# Patient Record
Sex: MALE | Race: BLACK OR AFRICAN AMERICAN | NOT HISPANIC OR LATINO
[De-identification: names, ages, dates, MRNs, and addresses within clinical notes are randomized per-mention and may not be internally consistent; named-entity substitution may affect disease eponyms.]

---

## 2018-03-12 ENCOUNTER — APPOINTMENT (OUTPATIENT)
Dept: ULTRASOUND IMAGING | Facility: HOSPITAL | Age: 83
End: 2018-03-12

## 2018-03-12 ENCOUNTER — OUTPATIENT (OUTPATIENT)
Dept: OUTPATIENT SERVICES | Facility: HOSPITAL | Age: 83
LOS: 1 days | Discharge: HOME | End: 2018-03-12
Payer: MEDICARE

## 2018-03-12 DIAGNOSIS — I82.403 ACUTE EMBOLISM AND THROMBOSIS OF UNSPECIFIED DEEP VEINS OF LOWER EXTREMITY, BILATERAL: ICD-10-CM

## 2018-03-12 PROCEDURE — 93925 LOWER EXTREMITY STUDY: CPT | Mod: 26

## 2019-08-22 ENCOUNTER — APPOINTMENT (OUTPATIENT)
Dept: PULMONOLOGY | Facility: CLINIC | Age: 84
End: 2019-08-22
Payer: MEDICARE

## 2019-08-22 VITALS
RESPIRATION RATE: 16 BRPM | SYSTOLIC BLOOD PRESSURE: 131 MMHG | WEIGHT: 173 LBS | HEART RATE: 68 BPM | BODY MASS INDEX: 30.65 KG/M2 | DIASTOLIC BLOOD PRESSURE: 68 MMHG | TEMPERATURE: 97.8 F | HEIGHT: 63 IN

## 2019-08-22 DIAGNOSIS — I25.2 OLD MYOCARDIAL INFARCTION: ICD-10-CM

## 2019-08-22 DIAGNOSIS — I25.10 ATHEROSCLEROTIC HEART DISEASE OF NATIVE CORONARY ARTERY W/OUT ANGINA PECTORIS: ICD-10-CM

## 2019-08-22 DIAGNOSIS — F17.200 NICOTINE DEPENDENCE, UNSPECIFIED, UNCOMPLICATED: ICD-10-CM

## 2019-08-22 DIAGNOSIS — Z72.0 TOBACCO USE: ICD-10-CM

## 2019-08-22 DIAGNOSIS — R06.83 SNORING: ICD-10-CM

## 2019-08-22 DIAGNOSIS — I73.9 PERIPHERAL VASCULAR DISEASE, UNSPECIFIED: ICD-10-CM

## 2019-08-22 PROCEDURE — 99204 OFFICE O/P NEW MOD 45 MIN: CPT

## 2019-08-22 RX ORDER — ASPIRIN 81 MG/1
81 TABLET ORAL
Refills: 0 | Status: ACTIVE | COMMUNITY
Start: 2019-08-22

## 2019-08-22 RX ORDER — METOPROLOL TARTRATE 100 MG/1
100 TABLET, FILM COATED ORAL
Refills: 0 | Status: ACTIVE | COMMUNITY
Start: 2019-08-22

## 2019-08-22 RX ORDER — CLOPIDOGREL BISULFATE 75 MG/1
75 TABLET, FILM COATED ORAL
Refills: 0 | Status: ACTIVE | COMMUNITY
Start: 2019-08-22

## 2019-08-22 NOTE — HISTORY OF PRESENT ILLNESS
[Snoring] : Snoring [FreeTextEntry1] : Mr. Loyd is an 83-year-old male with significant past medical history of coronary artery disease who comes in for evaluation of sleep disorder breathing. Per the patient he recently underwent stents in his lower extremities for peripheral vascular disease and was noted to have loud snoring and witnessed apnea. The patient indicates that he has excessive daytime sleepiness, nonrestorative sleep, and frequent nocturnal awakenings for which he attributes to nocturia. He states that in the last year he might have gotten up 2 or 3 times gasping for air in the middle of the night.\par \par After further discussion, the patient does admit to a lifelong smoking history. He began smoking in his teenage years, and is still smoking today. He used to smoke between 1-2 packs a day, but now smokes half a pack a day. He is tried to stop smoking but has been unsuccessful. He is interested in quitting.\par \par He also states that he has dyspnea on exertion. He has noticed that he tends to lean forward when he tries to exhale. He can only walk one or 2 blocks without being short of breath. He admits however, this has gotten worse since his mild myocardial infarction.

## 2019-08-22 NOTE — REVIEW OF SYSTEMS
[Fatigue] : fatigue [EDS: ESS=____] : daytime somnolence: ESS=[unfilled] [Snoring] : snoring [Witnessed Apneas] : witnessed apnea [A.M. Dry Mouth] : a.m. dry mouth [Obesity] : obesity [A.M. Headache] : headache present upon awakening [Negative] : Psychiatric [Leg Dysesthesias] : no leg dysesthesias [Difficulty Initiating Sleep] : no difficulty falling asleep [Difficulty Maintaining Sleep] : no difficulty maintaining sleep [Lower Extremity Discomfort] : no lower extremity discomfort [Irresistible urge to move legs] : no irresistible urge to move legs because of lower extremity discomfort [Late day/ Evening symptoms] : no late day/evening symptoms [Sleep Disturbances due to LE symptoms] : ~T no sleep disturbances due to lower extremity symptoms [Unusual Sleep Behavior] : no unusual sleep behavior [Hypersomnolence] : not sleeping much more than usual [Cataplexy] :  no cataplexy [Hypnopompic Hallucinations] : no hypnopompic hallucinations [Hypnogogic Hallucinations] : no hypnogogic hallucinations [Sleep Paralysis] : no sleep paralysis

## 2019-08-22 NOTE — PHYSICAL EXAM
[General Appearance - Well Developed] : well developed [Normal Appearance] : normal appearance [General Appearance - Well Nourished] : well nourished [Normal Conjunctiva] : the conjunctiva exhibited no abnormalities [Enlarged Base of the Tongue] : enlargement of the base of the tongue [Low Lying Soft Palate] : low lying soft palate [IV] : IV [Neck Appearance] : the appearance of the neck was normal [Neck Circumference: ___] : neck circumference is [unfilled] [Neck Cervical Mass (___cm)] : no neck mass was observed [Apical Impulse] : the apical impulse was normal [Heart Rate And Rhythm] : heart rate was normal and rhythm regular [Heart Sounds] : normal S1 and S2 [] : no respiratory distress [Auscultation Breath Sounds / Voice Sounds] : lungs were clear to auscultation bilaterally [Respiration, Rhythm And Depth] : normal respiratory rhythm and effort [Musculoskeletal - Swelling] : no joint swelling seen [Cyanosis, Localized] : no localized cyanosis [Nail Clubbing] : no clubbing of the fingernails [Petechial Hemorrhages (___cm)] : no petechial hemorrhages [Skin Color & Pigmentation] : normal skin color and pigmentation [Skin Turgor] : normal skin turgor [No Focal Deficits] : no focal deficits [Oriented To Time, Place, And Person] : oriented to person, place, and time [Impaired Insight] : insight and judgment were intact [Affect] : the affect was normal [FreeTextEntry2] : No edema

## 2019-08-27 ENCOUNTER — EMERGENCY (EMERGENCY)
Facility: HOSPITAL | Age: 84
LOS: 0 days | Discharge: HOME | End: 2019-08-27
Payer: MEDICARE

## 2019-08-27 VITALS
HEART RATE: 74 BPM | OXYGEN SATURATION: 97 % | WEIGHT: 173.06 LBS | DIASTOLIC BLOOD PRESSURE: 72 MMHG | RESPIRATION RATE: 19 BRPM | TEMPERATURE: 98 F | SYSTOLIC BLOOD PRESSURE: 147 MMHG | HEIGHT: 65 IN

## 2019-08-27 VITALS
HEART RATE: 57 BPM | OXYGEN SATURATION: 97 % | SYSTOLIC BLOOD PRESSURE: 122 MMHG | DIASTOLIC BLOOD PRESSURE: 57 MMHG | TEMPERATURE: 98 F | RESPIRATION RATE: 15 BRPM
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
## 2019-08-27 DIAGNOSIS — I10 ESSENTIAL (PRIMARY) HYPERTENSION: ICD-10-CM

## 2019-08-27 DIAGNOSIS — M25.552 PAIN IN LEFT HIP: ICD-10-CM

## 2019-08-27 DIAGNOSIS — M25.512 PAIN IN LEFT SHOULDER: ICD-10-CM

## 2019-08-27 DIAGNOSIS — I25.10 ATHEROSCLEROTIC HEART DISEASE OF NATIVE CORONARY ARTERY WITHOUT ANGINA PECTORIS: ICD-10-CM
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
## 2019-08-27 PROCEDURE — 72192 CT PELVIS W/O DYE: CPT | Mod: 26

## 2019-08-27 PROCEDURE — 73030 X-RAY EXAM OF SHOULDER: CPT | Mod: 26,LT

## 2019-08-27 PROCEDURE — 99284 EMERGENCY DEPT VISIT MOD MDM: CPT

## 2019-08-27 PROCEDURE — 76377 3D RENDER W/INTRP POSTPROCES: CPT | Mod: 26

## 2019-08-27 PROCEDURE — 71046 X-RAY EXAM CHEST 2 VIEWS: CPT | Mod: 26

## 2019-08-27 RX ORDER — CYCLOBENZAPRINE HYDROCHLORIDE 10 MG/1
5 TABLET, FILM COATED ORAL ONCE
Refills: 0 | Status: COMPLETED | OUTPATIENT
Start: 2019-08-27 | End: 2019-08-27

## 2019-08-27 RX ORDER — CYCLOBENZAPRINE HYDROCHLORIDE 10 MG/1
1 TABLET, FILM COATED ORAL
Qty: 15 | Refills: 0
Start: 2019-08-27 | End: 2019-08-31

## 2019-08-27 RX ORDER — ACETAMINOPHEN 500 MG
650 TABLET ORAL ONCE
Refills: 0 | Status: COMPLETED | OUTPATIENT
Start: 2019-08-27 | End: 2019-08-27

## 2019-08-27 RX ADMIN — Medication 650 MILLIGRAM(S): at 18:20

## 2019-08-27 RX ADMIN — CYCLOBENZAPRINE HYDROCHLORIDE 5 MILLIGRAM(S): 10 TABLET, FILM COATED ORAL at 17:49

## 2019-08-27 RX ADMIN — Medication 650 MILLIGRAM(S): at 17:51

## 2019-08-27 NOTE — ED ADULT TRIAGE NOTE - CHIEF COMPLAINT QUOTE
Pt walked in c/o pain to the left shoulder and left leg s/p tripped and fell on the street 4 days ago denies any LOC hx cad of with stents

## 2019-08-27 NOTE — ED PROVIDER NOTE - CLINICAL SUMMARY MEDICAL DECISION MAKING FREE TEXT BOX
84 yo M s/p fall 4 days ago, with L shoulder and L hip pain on certain movements, benign exam, unable to reproduce pain, given age will give tylenol, lose dose muscle relaxer, obtain XRs and CT pelvis and reassess  XRs and CT negative, pt feeling much better after medications, plan to d/c home with outpt ortho f/u

## 2019-08-27 NOTE — ED PROVIDER NOTE - MUSCULOSKELETAL, MLM
L shoulder NTTP, FROM intact, L lateral ribs NTTP, L hip NTTP, FROM intact.  Spine appears normal, range of motion is not limited, no muscle or joint tenderness

## 2019-08-27 NOTE — ED PROVIDER NOTE - OBJECTIVE STATEMENT
82 yo M with PMHx HTN, CAD s/p stents many years ago (not on asa, plavix or other blood thinners), presents to ED c/o L shoulder pain s/p fall 4 days ago.  Pt states he was walking when he fell on uneven sidewalk, did not lose consciousness or hit his head, pt states he fell onto his L side, now c/o L shoulder pain with certain movements, and mild L hip pain.  Pt denies and CP, SOB, dizziness, numbness, tingling, HA, visual changes, or other acute complaints.  Pt is able to ambulate. Took ibuprofen with relief     ROS: No fever/chills. No eye pain/changes in vision, No ear pain/sore throat/dysphagia, No chest pain/palpitations. No SOB/cough/. No abdominal pain, N/V/D, no black/bloody bm. No dysuria/frequency/discharge, No headache. No Dizziness.    No rashes or breaks in skin. No numbness/tingling/weakness.

## 2019-08-27 NOTE — ED PROVIDER NOTE - PATIENT PORTAL LINK FT
You can access the FollowMyHealth Patient Portal offered by [facility] by registering at the following website: [email or web address]. By joining [employer]’s FollowMyHealth portal, you will also be able to view your health information using other applications (apps) compatible with our system.

## 2019-08-27 NOTE — ED PROVIDER NOTE - PROGRESS NOTE DETAILS
XRs negative for fx CT pelvis negative, pt feeling much better after medictaions plan to d/c home with outpt f/u with ortho, strict return precautions given including worsening pain, CP, SOB, pt and family at bedside verbalized understanding, tylenol prn  Kristi Cho PA-C

## 2019-08-27 NOTE — ED PROVIDER NOTE - CARE PLAN
Principal Discharge DX:	Fall, initial encounter  Secondary Diagnosis:	Acute pain of left shoulder  Secondary Diagnosis:	Pain of left hip joint

## 2019-08-27 NOTE — ED ADULT NURSE NOTE - OBJECTIVE STATEMENT
83 year old male presents with left shoulder and arm Pt with pain to fall on Friday he fell in the street . He fell onto his left  side denies hitting his head.   Dragging right foot since the fall. He denies:  chest pains, SOB numbness and tingling

## 2019-09-05 ENCOUNTER — APPOINTMENT (OUTPATIENT)
Dept: PULMONOLOGY | Facility: CLINIC | Age: 84
End: 2019-09-05
Payer: MEDICARE

## 2019-09-05 PROCEDURE — 94726 PLETHYSMOGRAPHY LUNG VOLUMES: CPT

## 2019-09-05 PROCEDURE — 94060 EVALUATION OF WHEEZING: CPT

## 2019-09-05 PROCEDURE — 94729 DIFFUSING CAPACITY: CPT

## 2019-09-09 ENCOUNTER — APPOINTMENT (OUTPATIENT)
Dept: PULMONOLOGY | Facility: CLINIC | Age: 84
End: 2019-09-09

## 2019-10-25 ENCOUNTER — CLINICAL ADVICE (OUTPATIENT)
Age: 84
End: 2019-10-25

## 2019-10-31 ENCOUNTER — APPOINTMENT (OUTPATIENT)
Dept: SLEEP CENTER | Facility: CLINIC | Age: 84
End: 2019-10-31

## 2019-11-20 NOTE — ASSESSMENT
[FreeTextEntry1] : This is a 83 year old male here for evaluation of possible sleep apnea. The patient has multiple signs and symptoms of sleep-disordered breathing include loud snoring, witnessed apnea,  excessive daytime sleepiness, nonrestorative sleep, and frequent nocturnal awakenings  . He was referred to the [facility] for a diagnostic PSG. The ramifications of RAD and its potential therapeutic modalities were discussed with the patient. The patient was cautioned on the importance of avoiding drowsy driving. He will follow up with us after the PSG.\par \par Given his smoking history, I have also recommended to perform PFTs to evaluate if he has COPD. I spoke at length about how to quit smoking, and gave him a referral to the [facility] tobacco control center.
[phone]

## 2020-01-13 ENCOUNTER — APPOINTMENT (OUTPATIENT)
Dept: SLEEP CENTER | Facility: CLINIC | Age: 85
End: 2020-01-13
Payer: MEDICARE

## 2020-01-13 ENCOUNTER — OUTPATIENT (OUTPATIENT)
Dept: OUTPATIENT SERVICES | Facility: HOSPITAL | Age: 85
LOS: 1 days | End: 2020-01-13
Payer: MEDICARE

## 2020-01-13 PROCEDURE — 95806 SLEEP STUDY UNATT&RESP EFFT: CPT

## 2020-01-13 PROCEDURE — 95806 SLEEP STUDY UNATT&RESP EFFT: CPT | Mod: 26

## 2020-01-17 DIAGNOSIS — G47.33 OBSTRUCTIVE SLEEP APNEA (ADULT) (PEDIATRIC): ICD-10-CM

## 2020-01-21 DIAGNOSIS — G47.33 OBSTRUCTIVE SLEEP APNEA (ADULT) (PEDIATRIC): ICD-10-CM

## 2020-03-09 ENCOUNTER — FORM ENCOUNTER (OUTPATIENT)
Age: 85
End: 2020-03-09

## 2020-03-10 ENCOUNTER — APPOINTMENT (OUTPATIENT)
Dept: SLEEP CENTER | Facility: CLINIC | Age: 85
End: 2020-03-10
Payer: MEDICARE

## 2020-03-10 ENCOUNTER — OUTPATIENT (OUTPATIENT)
Dept: OUTPATIENT SERVICES | Facility: HOSPITAL | Age: 85
LOS: 1 days | End: 2020-03-10
Payer: MEDICARE

## 2020-03-10 PROCEDURE — G0400: CPT

## 2020-03-10 PROCEDURE — G0400: CPT | Mod: 26

## 2020-03-25 DIAGNOSIS — G47.33 OBSTRUCTIVE SLEEP APNEA (ADULT) (PEDIATRIC): ICD-10-CM

## 2021-06-10 ENCOUNTER — APPOINTMENT (OUTPATIENT)
Dept: VASCULAR SURGERY | Facility: CLINIC | Age: 86
End: 2021-06-10

## 2021-11-18 ENCOUNTER — APPOINTMENT (OUTPATIENT)
Dept: ORTHOPEDIC SURGERY | Facility: CLINIC | Age: 86
End: 2021-11-18
Payer: MEDICARE

## 2021-11-18 VITALS — WEIGHT: 167 LBS | HEIGHT: 62 IN | BODY MASS INDEX: 30.73 KG/M2

## 2021-11-18 DIAGNOSIS — M17.11 UNILATERAL PRIMARY OSTEOARTHRITIS, RIGHT KNEE: ICD-10-CM

## 2021-11-18 DIAGNOSIS — M54.50 LOW BACK PAIN, UNSPECIFIED: ICD-10-CM

## 2021-11-18 PROCEDURE — 72100 X-RAY EXAM L-S SPINE 2/3 VWS: CPT

## 2021-11-18 PROCEDURE — 73562 X-RAY EXAM OF KNEE 3: CPT | Mod: RT

## 2021-11-18 PROCEDURE — 99204 OFFICE O/P NEW MOD 45 MIN: CPT

## 2021-11-25 ENCOUNTER — INPATIENT (INPATIENT)
Facility: HOSPITAL | Age: 86
LOS: 18 days | Discharge: NURSING, REHAB OR LONG-TERM CARE | End: 2021-12-14
Attending: INTERNAL MEDICINE | Admitting: INTERNAL MEDICINE
Payer: MEDICARE

## 2021-11-25 VITALS
TEMPERATURE: 102 F | DIASTOLIC BLOOD PRESSURE: 74 MMHG | HEART RATE: 79 BPM | SYSTOLIC BLOOD PRESSURE: 117 MMHG | WEIGHT: 160.06 LBS | RESPIRATION RATE: 20 BRPM | OXYGEN SATURATION: 100 % | HEIGHT: 65 IN

## 2021-11-25 LAB
ALBUMIN SERPL ELPH-MCNC: 3.2 G/DL — LOW (ref 3.3–5)
ALP SERPL-CCNC: 112 U/L — SIGNIFICANT CHANGE UP (ref 40–120)
ALT FLD-CCNC: 30 U/L — SIGNIFICANT CHANGE UP (ref 12–78)
ANION GAP SERPL CALC-SCNC: 8 MMOL/L — SIGNIFICANT CHANGE UP (ref 5–17)
AST SERPL-CCNC: 63 U/L — HIGH (ref 15–37)
BILIRUB SERPL-MCNC: 0.3 MG/DL — SIGNIFICANT CHANGE UP (ref 0.2–1.2)
BUN SERPL-MCNC: 36 MG/DL — HIGH (ref 7–23)
CALCIUM SERPL-MCNC: 9.2 MG/DL — SIGNIFICANT CHANGE UP (ref 8.5–10.1)
CHLORIDE SERPL-SCNC: 106 MMOL/L — SIGNIFICANT CHANGE UP (ref 96–108)
CO2 SERPL-SCNC: 24 MMOL/L — SIGNIFICANT CHANGE UP (ref 22–31)
CREAT SERPL-MCNC: 1.79 MG/DL — HIGH (ref 0.5–1.3)
GLUCOSE SERPL-MCNC: 120 MG/DL — HIGH (ref 70–99)
LACTATE SERPL-SCNC: 1.7 MMOL/L — SIGNIFICANT CHANGE UP (ref 0.7–2)
MAGNESIUM SERPL-MCNC: 2.5 MG/DL — SIGNIFICANT CHANGE UP (ref 1.6–2.6)
PHOSPHATE SERPL-MCNC: 3.3 MG/DL — SIGNIFICANT CHANGE UP (ref 2.5–4.5)
POTASSIUM SERPL-MCNC: 4.4 MMOL/L — SIGNIFICANT CHANGE UP (ref 3.5–5.3)
POTASSIUM SERPL-SCNC: 4.4 MMOL/L — SIGNIFICANT CHANGE UP (ref 3.5–5.3)
PROT SERPL-MCNC: 7.3 GM/DL — SIGNIFICANT CHANGE UP (ref 6–8.3)
SODIUM SERPL-SCNC: 138 MMOL/L — SIGNIFICANT CHANGE UP (ref 135–145)
TROPONIN I, HIGH SENSITIVITY RESULT: 43.8 NG/L — SIGNIFICANT CHANGE UP

## 2021-11-25 PROCEDURE — 99291 CRITICAL CARE FIRST HOUR: CPT

## 2021-11-25 PROCEDURE — 70450 CT HEAD/BRAIN W/O DYE: CPT | Mod: 26,MA

## 2021-11-25 PROCEDURE — 71045 X-RAY EXAM CHEST 1 VIEW: CPT | Mod: 26

## 2021-11-25 PROCEDURE — 93010 ELECTROCARDIOGRAM REPORT: CPT

## 2021-11-25 RX ORDER — SODIUM CHLORIDE 9 MG/ML
2300 INJECTION, SOLUTION INTRAVENOUS ONCE
Refills: 0 | Status: COMPLETED | OUTPATIENT
Start: 2021-11-25 | End: 2021-11-25

## 2021-11-25 RX ORDER — SODIUM CHLORIDE 9 MG/ML
1000 INJECTION INTRAMUSCULAR; INTRAVENOUS; SUBCUTANEOUS ONCE
Refills: 0 | Status: DISCONTINUED | OUTPATIENT
Start: 2021-11-25 | End: 2021-11-25

## 2021-11-25 RX ORDER — VANCOMYCIN HCL 1 G
1000 VIAL (EA) INTRAVENOUS ONCE
Refills: 0 | Status: COMPLETED | OUTPATIENT
Start: 2021-11-25 | End: 2021-11-25

## 2021-11-25 RX ORDER — ACETAMINOPHEN 500 MG
650 TABLET ORAL ONCE
Refills: 0 | Status: COMPLETED | OUTPATIENT
Start: 2021-11-25 | End: 2021-11-25

## 2021-11-25 RX ORDER — CEFEPIME 1 G/1
1000 INJECTION, POWDER, FOR SOLUTION INTRAMUSCULAR; INTRAVENOUS ONCE
Refills: 0 | Status: COMPLETED | OUTPATIENT
Start: 2021-11-25 | End: 2021-11-25

## 2021-11-25 RX ADMIN — Medication 650 MILLIGRAM(S): at 22:56

## 2021-11-25 RX ADMIN — SODIUM CHLORIDE 2300 MILLILITER(S): 9 INJECTION, SOLUTION INTRAVENOUS at 22:55

## 2021-11-25 RX ADMIN — CEFEPIME 100 MILLIGRAM(S): 1 INJECTION, POWDER, FOR SOLUTION INTRAMUSCULAR; INTRAVENOUS at 22:55

## 2021-11-25 NOTE — ED PROVIDER NOTE - OBJECTIVE STATEMENT
85M PMHx of CAD w/ stenting, HTN presenting with weakness and fatigue today. Described as generalized weakness a/w myalgias and fever at home. Denies any chest pain, shortness of breath, falls, trauma, syncope, neck pain, neck stiffness, dysuria/hematuria, diarrhea, constipation, abdominal pain, coughs, sick contacts. No prior covid vaccination.

## 2021-11-25 NOTE — ED PROVIDER NOTE - CLINICAL SUMMARY MEDICAL DECISION MAKING FREE TEXT BOX
The patient meets SIRS criteria with (+)  Temp > 100.4F or < 96.8F,  HR > 90 BPM,    Suspected source of infection is: XXX. Patient is currently hemodynamically stable, normotensive, mentating well.   - Two large-bore IV's, 30ml/kg lactated ringers fluid resuscitation,   - CBC, CMP, PT/PTT/INR, Troponin, BNP, UA/UCx, 2 x BCx, ABG with lactate + COVID-19 rule out.   - CXR     - Unknown Source: Vancomycin 1g IVPB + Cefepime 1g IVPB    - Re-assessment for fluid responsiveness then admit

## 2021-11-25 NOTE — ED ADULT TRIAGE NOTE - CHIEF COMPLAINT QUOTE
As per EMS states pt stopped taking his medication to take holistic meds. States pt with weight loss, dehydration and fogginess.

## 2021-11-25 NOTE — ED PROVIDER NOTE - PROGRESS NOTE DETAILS
(+) COVID, (+) UTI, saturating 95% on 2L NC. otherwise hemodynamically stable, lactate 1.7, mild BERTHA 1.79, CTH negative. discussed with daughter, to be admitted.

## 2021-11-25 NOTE — ED PROVIDER NOTE - CARE PLAN
1 Principal Discharge DX:	Acute respiratory failure with hypoxia  Secondary Diagnosis:	Pneumonia due to COVID-19 virus  Secondary Diagnosis:	Acute UTI

## 2021-11-25 NOTE — ED ADULT NURSE NOTE - OBJECTIVE STATEMENT
84yo M hx Aox3, hx of prostate cancer, cardiac stent on blood thinners, nkda, presenst to ED As per EMS states pt stopped taking his medication to take holistic meds. States pt with weight loss, dehydration and fogginess.  As per daughter pt fell out of bed last sat, unsure if pt hit his head.  Then since sunday pt seemed "a bit far" and was not eating and had no appetite.   Pt denies any pain or discomfort right now.

## 2021-11-25 NOTE — ED ADULT NURSE NOTE - ED STAT RN HANDOFF DETAILS
Report given to receiving RN,pts history, current condition and reason for admission discussed, safety concerns addressed and reviewed, pt currently in stable condition, IV flushes for patency and site shows no signs or symptoms of infiltrate, dressing is clean dry and intact, pt is aware of plan of care. Pt education deemed successful at time of report after patient demonstrates successful teach back for proficiency.

## 2021-11-26 DIAGNOSIS — I25.10 ATHEROSCLEROTIC HEART DISEASE OF NATIVE CORONARY ARTERY WITHOUT ANGINA PECTORIS: ICD-10-CM

## 2021-11-26 DIAGNOSIS — U07.1 COVID-19: ICD-10-CM

## 2021-11-26 DIAGNOSIS — N17.9 ACUTE KIDNEY FAILURE, UNSPECIFIED: ICD-10-CM

## 2021-11-26 DIAGNOSIS — N30.00 ACUTE CYSTITIS WITHOUT HEMATURIA: ICD-10-CM

## 2021-11-26 DIAGNOSIS — E78.5 HYPERLIPIDEMIA, UNSPECIFIED: ICD-10-CM

## 2021-11-26 DIAGNOSIS — Z29.9 ENCOUNTER FOR PROPHYLACTIC MEASURES, UNSPECIFIED: ICD-10-CM

## 2021-11-26 LAB
ALBUMIN SERPL ELPH-MCNC: 3.1 G/DL — LOW (ref 3.3–5)
ALP SERPL-CCNC: 110 U/L — SIGNIFICANT CHANGE UP (ref 40–120)
ALT FLD-CCNC: 38 U/L — SIGNIFICANT CHANGE UP (ref 12–78)
APPEARANCE UR: ABNORMAL
APTT BLD: 36.6 SEC — HIGH (ref 27.5–35.5)
AST SERPL-CCNC: 92 U/L — HIGH (ref 15–37)
BACTERIA # UR AUTO: ABNORMAL
BASOPHILS # BLD AUTO: 0.02 K/UL — SIGNIFICANT CHANGE UP (ref 0–0.2)
BASOPHILS NFR BLD AUTO: 0.5 % — SIGNIFICANT CHANGE UP (ref 0–2)
BILIRUB DIRECT SERPL-MCNC: 0.1 MG/DL — SIGNIFICANT CHANGE UP (ref 0–0.3)
BILIRUB INDIRECT FLD-MCNC: 0.2 MG/DL — SIGNIFICANT CHANGE UP (ref 0.2–1)
BILIRUB SERPL-MCNC: 0.3 MG/DL — SIGNIFICANT CHANGE UP (ref 0.2–1.2)
BILIRUB UR-MCNC: NEGATIVE — SIGNIFICANT CHANGE UP
COLOR SPEC: YELLOW — SIGNIFICANT CHANGE UP
CREAT SERPL-MCNC: 1.35 MG/DL — HIGH (ref 0.5–1.3)
D DIMER BLD IA.RAPID-MCNC: 222 NG/ML DDU — SIGNIFICANT CHANGE UP
DIFF PNL FLD: ABNORMAL
EOSINOPHIL # BLD AUTO: 0.03 K/UL — SIGNIFICANT CHANGE UP (ref 0–0.5)
EOSINOPHIL NFR BLD AUTO: 0.7 % — SIGNIFICANT CHANGE UP (ref 0–6)
EPI CELLS # UR: SIGNIFICANT CHANGE UP
FLUAV AG NPH QL: SIGNIFICANT CHANGE UP
FLUBV AG NPH QL: SIGNIFICANT CHANGE UP
GLUCOSE BLDC GLUCOMTR-MCNC: 108 MG/DL — HIGH (ref 70–99)
GLUCOSE BLDC GLUCOMTR-MCNC: 132 MG/DL — HIGH (ref 70–99)
GLUCOSE BLDC GLUCOMTR-MCNC: 91 MG/DL — SIGNIFICANT CHANGE UP (ref 70–99)
GLUCOSE UR QL: NEGATIVE MG/DL — SIGNIFICANT CHANGE UP
GRAN CASTS # UR COMP ASSIST: ABNORMAL /LPF
HCT VFR BLD CALC: 46.4 % — SIGNIFICANT CHANGE UP (ref 39–50)
HGB BLD-MCNC: 15.1 G/DL — SIGNIFICANT CHANGE UP (ref 13–17)
HYALINE CASTS # UR AUTO: ABNORMAL /LPF
IMM GRANULOCYTES NFR BLD AUTO: 0.7 % — SIGNIFICANT CHANGE UP (ref 0–1.5)
INR BLD: 1.11 RATIO — SIGNIFICANT CHANGE UP (ref 0.88–1.16)
KETONES UR-MCNC: ABNORMAL
LEUKOCYTE ESTERASE UR-ACNC: ABNORMAL
LYMPHOCYTES # BLD AUTO: 0.78 K/UL — LOW (ref 1–3.3)
LYMPHOCYTES # BLD AUTO: 17.8 % — SIGNIFICANT CHANGE UP (ref 13–44)
MCHC RBC-ENTMCNC: 29.9 PG — SIGNIFICANT CHANGE UP (ref 27–34)
MCHC RBC-ENTMCNC: 32.5 GM/DL — SIGNIFICANT CHANGE UP (ref 32–36)
MCV RBC AUTO: 91.9 FL — SIGNIFICANT CHANGE UP (ref 80–100)
MONOCYTES # BLD AUTO: 0.26 K/UL — SIGNIFICANT CHANGE UP (ref 0–0.9)
MONOCYTES NFR BLD AUTO: 5.9 % — SIGNIFICANT CHANGE UP (ref 2–14)
NEUTROPHILS # BLD AUTO: 3.25 K/UL — SIGNIFICANT CHANGE UP (ref 1.8–7.4)
NEUTROPHILS NFR BLD AUTO: 74.4 % — SIGNIFICANT CHANGE UP (ref 43–77)
NITRITE UR-MCNC: POSITIVE
NRBC # BLD: 0 /100 WBCS — SIGNIFICANT CHANGE UP (ref 0–0)
NT-PROBNP SERPL-SCNC: 83 PG/ML — SIGNIFICANT CHANGE UP (ref 0–450)
PH UR: 5 — SIGNIFICANT CHANGE UP (ref 5–8)
PLATELET # BLD AUTO: 114 K/UL — LOW (ref 150–400)
PROCALCITONIN SERPL-MCNC: 0.3 NG/ML — HIGH (ref 0.02–0.1)
PROT SERPL-MCNC: 7.2 GM/DL — SIGNIFICANT CHANGE UP (ref 6–8.3)
PROT UR-MCNC: 100 MG/DL
PROTHROM AB SERPL-ACNC: 12.8 SEC — SIGNIFICANT CHANGE UP (ref 10.6–13.6)
RBC # BLD: 5.05 M/UL — SIGNIFICANT CHANGE UP (ref 4.2–5.8)
RBC # FLD: 14.6 % — HIGH (ref 10.3–14.5)
RBC CASTS # UR COMP ASSIST: ABNORMAL /HPF (ref 0–4)
SARS-COV-2 RNA SPEC QL NAA+PROBE: DETECTED
SP GR SPEC: 1.02 — SIGNIFICANT CHANGE UP (ref 1.01–1.02)
UROBILINOGEN FLD QL: 1 MG/DL
WBC # BLD: 4.37 K/UL — SIGNIFICANT CHANGE UP (ref 3.8–10.5)
WBC # FLD AUTO: 4.37 K/UL — SIGNIFICANT CHANGE UP (ref 3.8–10.5)
WBC UR QL: ABNORMAL

## 2021-11-26 PROCEDURE — 99233 SBSQ HOSP IP/OBS HIGH 50: CPT

## 2021-11-26 PROCEDURE — 99222 1ST HOSP IP/OBS MODERATE 55: CPT

## 2021-11-26 RX ORDER — GLUCAGON INJECTION, SOLUTION 0.5 MG/.1ML
1 INJECTION, SOLUTION SUBCUTANEOUS ONCE
Refills: 0 | Status: DISCONTINUED | OUTPATIENT
Start: 2021-11-26 | End: 2021-12-14

## 2021-11-26 RX ORDER — CLOPIDOGREL BISULFATE 75 MG/1
75 TABLET, FILM COATED ORAL DAILY
Refills: 0 | Status: DISCONTINUED | OUTPATIENT
Start: 2021-11-26 | End: 2021-12-14

## 2021-11-26 RX ORDER — DEXTROSE 50 % IN WATER 50 %
15 SYRINGE (ML) INTRAVENOUS ONCE
Refills: 0 | Status: DISCONTINUED | OUTPATIENT
Start: 2021-11-26 | End: 2021-12-14

## 2021-11-26 RX ORDER — DEXTROSE 50 % IN WATER 50 %
25 SYRINGE (ML) INTRAVENOUS ONCE
Refills: 0 | Status: DISCONTINUED | OUTPATIENT
Start: 2021-11-26 | End: 2021-12-14

## 2021-11-26 RX ORDER — DEXAMETHASONE 0.5 MG/5ML
6 ELIXIR ORAL DAILY
Refills: 0 | Status: DISCONTINUED | OUTPATIENT
Start: 2021-11-26 | End: 2021-11-26

## 2021-11-26 RX ORDER — REMDESIVIR 5 MG/ML
200 INJECTION INTRAVENOUS EVERY 24 HOURS
Refills: 0 | Status: COMPLETED | OUTPATIENT
Start: 2021-11-26 | End: 2021-11-26

## 2021-11-26 RX ORDER — INSULIN LISPRO 100/ML
VIAL (ML) SUBCUTANEOUS AT BEDTIME
Refills: 0 | Status: DISCONTINUED | OUTPATIENT
Start: 2021-11-26 | End: 2021-12-09

## 2021-11-26 RX ORDER — REMDESIVIR 5 MG/ML
INJECTION INTRAVENOUS
Refills: 0 | Status: DISCONTINUED | OUTPATIENT
Start: 2021-11-26 | End: 2021-11-26

## 2021-11-26 RX ORDER — CEFTRIAXONE 500 MG/1
1000 INJECTION, POWDER, FOR SOLUTION INTRAMUSCULAR; INTRAVENOUS EVERY 24 HOURS
Refills: 0 | Status: DISCONTINUED | OUTPATIENT
Start: 2021-11-26 | End: 2021-11-26

## 2021-11-26 RX ORDER — SODIUM CHLORIDE 9 MG/ML
1000 INJECTION, SOLUTION INTRAVENOUS
Refills: 0 | Status: DISCONTINUED | OUTPATIENT
Start: 2021-11-26 | End: 2021-12-14

## 2021-11-26 RX ORDER — DEXTROSE 50 % IN WATER 50 %
12.5 SYRINGE (ML) INTRAVENOUS ONCE
Refills: 0 | Status: DISCONTINUED | OUTPATIENT
Start: 2021-11-26 | End: 2021-12-14

## 2021-11-26 RX ORDER — ENOXAPARIN SODIUM 100 MG/ML
40 INJECTION SUBCUTANEOUS DAILY
Refills: 0 | Status: DISCONTINUED | OUTPATIENT
Start: 2021-11-26 | End: 2021-12-14

## 2021-11-26 RX ORDER — INSULIN LISPRO 100/ML
VIAL (ML) SUBCUTANEOUS
Refills: 0 | Status: DISCONTINUED | OUTPATIENT
Start: 2021-11-26 | End: 2021-12-09

## 2021-11-26 RX ORDER — ATORVASTATIN CALCIUM 80 MG/1
20 TABLET, FILM COATED ORAL AT BEDTIME
Refills: 0 | Status: DISCONTINUED | OUTPATIENT
Start: 2021-11-26 | End: 2021-12-14

## 2021-11-26 RX ORDER — ACETAMINOPHEN 500 MG
650 TABLET ORAL EVERY 6 HOURS
Refills: 0 | Status: DISCONTINUED | OUTPATIENT
Start: 2021-11-26 | End: 2021-12-14

## 2021-11-26 RX ADMIN — ENOXAPARIN SODIUM 40 MILLIGRAM(S): 100 INJECTION SUBCUTANEOUS at 12:11

## 2021-11-26 RX ADMIN — Medication 1000 MILLIGRAM(S): at 03:04

## 2021-11-26 RX ADMIN — SODIUM CHLORIDE 2300 MILLILITER(S): 9 INJECTION, SOLUTION INTRAVENOUS at 03:04

## 2021-11-26 RX ADMIN — Medication 250 MILLIGRAM(S): at 00:08

## 2021-11-26 RX ADMIN — CEFTRIAXONE 100 MILLIGRAM(S): 500 INJECTION, POWDER, FOR SOLUTION INTRAMUSCULAR; INTRAVENOUS at 05:11

## 2021-11-26 RX ADMIN — REMDESIVIR 200 MILLIGRAM(S): 5 INJECTION INTRAVENOUS at 06:53

## 2021-11-26 RX ADMIN — CEFEPIME 1000 MILLIGRAM(S): 1 INJECTION, POWDER, FOR SOLUTION INTRAMUSCULAR; INTRAVENOUS at 00:08

## 2021-11-26 RX ADMIN — CLOPIDOGREL BISULFATE 75 MILLIGRAM(S): 75 TABLET, FILM COATED ORAL at 12:11

## 2021-11-26 RX ADMIN — Medication 6 MILLIGRAM(S): at 05:10

## 2021-11-26 RX ADMIN — Medication 650 MILLIGRAM(S): at 00:08

## 2021-11-26 NOTE — PHARMACOTHERAPY INTERVENTION NOTE - INTERVENTION TYPE RECOOMEND
IV to PO
Please notify patient that overall things are very stable. Her A1c has increased a bit to 7.0. Would encouraged she just continue to work on diet and exercise.     MANUELA ShoemakerC  
Therapy Recommended - Drug indicated but not ordered

## 2021-11-26 NOTE — H&P ADULT - PROBLEM SELECTOR PLAN 2
WBCs and leuk esterase in urine  Will start on ceftriaxone.  Continue for now  Deescalate antibiotic when cultures return

## 2021-11-26 NOTE — PROGRESS NOTE ADULT - SUBJECTIVE AND OBJECTIVE BOX
Patient is a 85y old  Male who presents with a chief complaint of COVID, UTI (2021 02:07)      INTERVAL HPI/OVERNIGHT EVENTS: on 2L     MEDICATIONS  (STANDING):  atorvastatin 20 milliGRAM(s) Oral at bedtime  cefTRIAXone   IVPB 1000 milliGRAM(s) IV Intermittent every 24 hours  clopidogrel Tablet 75 milliGRAM(s) Oral daily  dexAMETHasone  Injectable 6 milliGRAM(s) IV Push daily  enoxaparin Injectable 40 milliGRAM(s) SubCutaneous daily  remdesivir  IVPB   IV Intermittent     MEDICATIONS  (PRN):  acetaminophen     Tablet .. 650 milliGRAM(s) Oral every 6 hours PRN Temp greater or equal to 38C (100.4F), Moderate Pain (4 - 6)      Allergies    No Known Allergies    Intolerances        REVIEW OF SYSTEMS:  CONSTITUTIONAL: No fever, weight loss, or fatigue  EYES: No eye pain, visual disturbances, or discharge  ENMT:  No difficulty hearing, tinnitus, vertigo; No sinus or throat pain  NECK: No pain or stiffness  BREASTS: No pain, masses, or nipple discharge  RESPIRATORY: No cough, wheezing, chills or hemoptysis; No shortness of breath  CARDIOVASCULAR: No chest pain, palpitations, dizziness, or leg swelling  GASTROINTESTINAL: No abdominal or epigastric pain. No nausea, vomiting, or hematemesis; No diarrhea or constipation. No melena or hematochezia.  GENITOURINARY: No dysuria, frequency, hematuria, or incontinence  NEUROLOGICAL: No headaches, memory loss, loss of strength, numbness, or tremors  SKIN: No itching, burning, rashes, or lesions   LYMPH NODES: No enlarged glands  ENDOCRINE: No heat or cold intolerance; No hair loss  MUSCULOSKELETAL: No joint pain or swelling; No muscle, back, or extremity pain  PSYCHIATRIC: No depression, anxiety, mood swings, or difficulty sleeping  HEME/LYMPH: No easy bruising, or bleeding gums  ALLERGY AND IMMUNOLOGIC: No hives or eczema    Vital Signs Last 24 Hrs  T(C): 36.8 (2021 09:44), Max: 38.8 (2021 21:51)  T(F): 98.2 (2021 09:44), Max: 101.9 (2021 21:51)  HR: 81 (2021 09:44) (65 - 81)  BP: 142/77 (2021 09:44) (108/65 - 142/77)  BP(mean): --  RR: 19 (2021 09:44) (15 - 20)  SpO2: 98% (2021 09:44) (95% - 100%)    PHYSICAL EXAM:  GENERAL: NAD, well-groomed, well-developed  HEAD:  Atraumatic, Normocephalic  EYES: EOMI, PERRLA, conjunctiva and sclera clear  ENMT: No tonsillar erythema, exudates, or enlargement; Moist mucous membranes, Good dentition, No lesions  NECK: Supple, No JVD, Normal thyroid  NERVOUS SYSTEM:  Alert & Oriented X3, Good concentration; Motor Strength 5/5 B/L upper and lower extremities; DTRs 2+ intact and symmetric  CHEST/LUNG: Clear to percussion bilaterally; No rales, rhonchi, wheezing, or rubs  HEART: Regular rate and rhythm; No murmurs, rubs, or gallops  ABDOMEN: Soft, Nontender, Nondistended; Bowel sounds present  EXTREMITIES:  2+ Peripheral Pulses, No clubbing, cyanosis, or edema  LYMPH: No lymphadenopathy noted  SKIN: No rashes or lesions    LABS:                        15.1   4.37  )-----------( 114      ( 2021 22:25 )             46.4         138  |  106  |  36<H>  ----------------------------<  120<H>  4.4   |  24  |  1.79<H>    Ca    9.2      2021 22:25  Phos  3.3       Mg     2.5         TPro  7.3  /  Alb  3.2<L>  /  TBili  0.3  /  DBili  x   /  AST  63<H>  /  ALT  30  /  AlkPhos  112  -25    PT/INR - ( 2021 22:47 )   PT: 12.8 sec;   INR: 1.11 ratio         PTT - ( 2021 22:47 )  PTT:36.6 sec  Urinalysis Basic - ( 2021 23:57 )    Color: Yellow / Appearance: Slightly Turbid / S.020 / pH: x  Gluc: x / Ketone: Trace  / Bili: Negative / Urobili: 1 mg/dL   Blood: x / Protein: 100 mg/dL / Nitrite: Positive   Leuk Esterase: Trace / RBC: 3-5 /HPF / WBC 6-10   Sq Epi: x / Non Sq Epi: Occasional / Bacteria: Many      CAPILLARY BLOOD GLUCOSE          RADIOLOGY & ADDITIONAL TESTS:    Imaging Personally Reviewed:  [ ] YES  [ ] NO    Consultant(s) Notes Reviewed:  [ ] YES  [ ] NO    Care Discussed with Consultants/Other Providers [ ] YES  [ ] NO Patient is a 85y old  Male who presents with a chief complaint of COVID, UTI (2021 02:07)      INTERVAL HPI/OVERNIGHT EVENTS: Pt currently on room air, confused, has no complaints.     MEDICATIONS  (STANDING):  atorvastatin 20 milliGRAM(s) Oral at bedtime  cefTRIAXone   IVPB 1000 milliGRAM(s) IV Intermittent every 24 hours  clopidogrel Tablet 75 milliGRAM(s) Oral daily  dexAMETHasone  Injectable 6 milliGRAM(s) IV Push daily  enoxaparin Injectable 40 milliGRAM(s) SubCutaneous daily  remdesivir  IVPB   IV Intermittent     MEDICATIONS  (PRN):  acetaminophen     Tablet .. 650 milliGRAM(s) Oral every 6 hours PRN Temp greater or equal to 38C (100.4F), Moderate Pain (4 - 6)      Allergies    No Known Allergies    Intolerances        REVIEW OF SYSTEMS:  no complaints but limited due to pt being confused    Vital Signs Last 24 Hrs  T(C): 36.8 (2021 09:44), Max: 38.8 (2021 21:51)  T(F): 98.2 (2021 09:44), Max: 101.9 (2021 21:51)  HR: 81 (2021 09:44) (65 - 81)  BP: 142/77 (2021 09:44) (108/65 - 142/77)  BP(mean): --  RR: 19 (2021 09:44) (15 - 20)  SpO2: 98% (2021 09:44) (95% - 100%)    PHYSICAL EXAM:  GENERAL: NAD, well-groomed, well-developed  HEAD:  Atraumatic, Normocephalic  EYES: EOMI, PERRLA, conjunctiva and sclera clear  ENMT: No tonsillar erythema, exudates, or enlargement; Moist mucous membranes, Good dentition, No lesions  NECK: Supple, No JVD,  NERVOUS SYSTEM:  Alert & Oriented X1, Good concentration; no neurological deficits seen  CHEST/LUNG: Clear to percussion bilaterally; No rales, rhonchi, wheezing, or rubs  HEART: Regular rate and rhythm; No murmurs, rubs, or gallops  ABDOMEN: Soft, Nontender, Nondistended; Bowel sounds present  EXTREMITIES:  2+ Peripheral Pulses, No clubbing, cyanosis, or edema  LYMPH: No lymphadenopathy noted  SKIN: No rashes or lesions    LABS:                        15.1   4.37  )-----------( 114      ( 2021 22:25 )             46.4         138  |  106  |  36<H>  ----------------------------<  120<H>  4.4   |  24  |  1.79<H>    Ca    9.2      2021 22:25  Phos  3.3       Mg     2.5         TPro  7.3  /  Alb  3.2<L>  /  TBili  0.3  /  DBili  x   /  AST  63<H>  /  ALT  30  /  AlkPhos  112      PT/INR - ( 2021 22:47 )   PT: 12.8 sec;   INR: 1.11 ratio         PTT - ( 2021 22:47 )  PTT:36.6 sec  Urinalysis Basic - ( 2021 23:57 )    Color: Yellow / Appearance: Slightly Turbid / S.020 / pH: x  Gluc: x / Ketone: Trace  / Bili: Negative / Urobili: 1 mg/dL   Blood: x / Protein: 100 mg/dL / Nitrite: Positive   Leuk Esterase: Trace / RBC: 3-5 /HPF / WBC 6-10   Sq Epi: x / Non Sq Epi: Occasional / Bacteria: Many      CAPILLARY BLOOD GLUCOSE          RADIOLOGY & ADDITIONAL TESTS:    Imaging Personally Reviewed:  [ ] YES  [ ] NO    Consultant(s) Notes Reviewed:  [ ] YES  [ ] NO    Care Discussed with Consultants/Other Providers [ ] YES  [ ] NO

## 2021-11-26 NOTE — H&P ADULT - ASSESSMENT
Patient is an 85M with a PMH of dementia, CAD, HLD who presents to the ED for medical evaluation.  Patient currently AAOx2, can provide history however has no complaints.  Unsure why he is in the hospital.  Family could not be reached over the phone.  Per ED staff, patient has stopped taking his medications at home.  Also having generalized weakness, fever, and myalgia.  Febrile in ED to 101.9, labs show increased creatinine.  SpO2 as low as 88% on room air - improved to 95% on 2L via NC.  Will admit to med surg.      IMPROVE VTE Individual Risk Assessment          RISK                                                          Points  [  ] Previous VTE                                                3  [  ] Thrombophilia                                             2  [  ] Lower limb paralysis                                    2        (unable to hold up >15 seconds)    [  ] Current Cancer                                             2         (within 6 months)  [  ] Immobilization > 24 hrs                              1  [  ] ICU/CCU stay > 24 hours                            1  [  ] Age > 60                                                    1    IMPROVE VTE Score - 1

## 2021-11-26 NOTE — H&P ADULT - NSHPLABSRESULTS_GEN_ALL_CORE
Recent Vitals  T(C): 37 (21 @ 01:17), Max: 38.8 (21 @ 21:51)  HR: 69 (21 @ 01:17) (69 - 79)  BP: 130/86 (21 @ 01:17) (117/74 - 130/86)  RR: 18 (21 @ 01:17) (15 - 20)  SpO2: 95% (21 @ 01:17) (95% - 100%)                        15.1   4.37  )-----------( 114      ( 2021 22:25 )             46.4         138  |  106  |  36<H>  ----------------------------<  120<H>  4.4   |  24  |  1.79<H>    Ca    9.2      2021 22:25  Phos  3.3       Mg     2.5         TPro  7.3  /  Alb  3.2<L>  /  TBili  0.3  /  DBili  x   /  AST  63<H>  /  ALT  30  /  AlkPhos  112      PT/INR - ( 2021 22:47 )   PT: 12.8 sec;   INR: 1.11 ratio         PTT - ( 2021 22:47 )  PTT:36.6 sec  LIVER FUNCTIONS - ( 2021 22:25 )  Alb: 3.2 g/dL / Pro: 7.3 gm/dL / ALK PHOS: 112 U/L / ALT: 30 U/L / AST: 63 U/L / GGT: x           Urinalysis Basic - ( 2021 23:57 )    Color: Yellow / Appearance: Slightly Turbid / S.020 / pH: x  Gluc: x / Ketone: Trace  / Bili: Negative / Urobili: 1 mg/dL   Blood: x / Protein: 100 mg/dL / Nitrite: Positive   Leuk Esterase: Trace / RBC: 3-5 /HPF / WBC 6-10   Sq Epi: x / Non Sq Epi: Occasional / Bacteria: Many        Home Medications:

## 2021-11-26 NOTE — CONSULT NOTE ADULT - ASSESSMENT
85M with a PMH of dementia, CAD, HLD who presents to the ED for medical evaluation.    Patient had fever and initially thought to have low oxygen level though most room air documentation seems to be above 94%   today he was taken off oxygen and saturation remains consistently above 94%  fever has resolved   UA only mildly possible with 6-10 WBC and he denies any urinary symptoms  if not requiring oxygen he does not need RDV or dexamethasone  additionally I do not believe he has a UTI and his fever is more likely related to covid   would stop antibiotics, rdv and dex     Plan  stop remdesivir  stop dexamethasone   stop ceftriaxone   follow cultures  continue to check vitals and if things change can restart rdv and dex   keep on contact and airborne precautions   trend fevers   trend wbc     Dr. Bui will be covering this weekend. To reach him, please call [phone]     D/W Dr. Sreedhar Vinson, DO  Infectious Disease Attending  Pager [phone]  After 5pm/weekends please call [phone] for all inquiries and new consults

## 2021-11-26 NOTE — H&P ADULT - HISTORY OF PRESENT ILLNESS
Patient is an 85M with a PMH of dementia, CAD, HLD who presents to the ED for medical evaluation.  Patient currently AAOx2, can provide history however has no complaints.  Unsure why he is in the hospital.  Family could not be reached over the phone.  Per ED staff, patient has stopped taking his medications at home.  Also having generalized weakness, fever, and myalgia.  Febrile in ED to 101.9, labs show increased creatinine.  SpO2 as low as 88% on room air - improved to 95% on 2L via NC.  Will admit to med surg.

## 2021-11-26 NOTE — PHARMACOTHERAPY INTERVENTION NOTE - COMMENTS
Recommended admelog sliding scale initiation since patient is receiving dexamethasone for COVID.
Recommended obtaining A1c as patient is receiving dexamethasone for COVID.
Recommended dexamethasone IV to PO switch since patient tolerating PO meds.

## 2021-11-26 NOTE — PATIENT PROFILE ADULT - NSASFALLNEEDSASSISTWITH_GEN_A_NUR
Patient states she is still having wheezing and mucus. Can she get another dose pack or something else. She is taking zyrtec at night like you told her. And Is using her inhaler. She is also taking Mucinex. She is drinking a lot of water. Symptoms are worse at nighttime. Patient also needs a refill on her inhaler. She doesn't want to run out over the weekend, she may be going out of town. standing/walking/toileting

## 2021-11-27 LAB
A1C WITH ESTIMATED AVERAGE GLUCOSE RESULT: 5.8 % — HIGH (ref 4–5.6)
ALBUMIN SERPL ELPH-MCNC: 3.2 G/DL — LOW (ref 3.3–5)
ALBUMIN SERPL ELPH-MCNC: 3.2 G/DL — LOW (ref 3.3–5)
ALP SERPL-CCNC: 106 U/L — SIGNIFICANT CHANGE UP (ref 40–120)
ALP SERPL-CCNC: 106 U/L — SIGNIFICANT CHANGE UP (ref 40–120)
ALT FLD-CCNC: 43 U/L — SIGNIFICANT CHANGE UP (ref 12–78)
ALT FLD-CCNC: 43 U/L — SIGNIFICANT CHANGE UP (ref 12–78)
ANION GAP SERPL CALC-SCNC: 8 MMOL/L — SIGNIFICANT CHANGE UP (ref 5–17)
AST SERPL-CCNC: 121 U/L — HIGH (ref 15–37)
AST SERPL-CCNC: 121 U/L — HIGH (ref 15–37)
BILIRUB DIRECT SERPL-MCNC: 0.2 MG/DL — SIGNIFICANT CHANGE UP (ref 0–0.3)
BILIRUB INDIRECT FLD-MCNC: 0.2 MG/DL — SIGNIFICANT CHANGE UP (ref 0.2–1)
BILIRUB SERPL-MCNC: 0.4 MG/DL — SIGNIFICANT CHANGE UP (ref 0.2–1.2)
BILIRUB SERPL-MCNC: 0.4 MG/DL — SIGNIFICANT CHANGE UP (ref 0.2–1.2)
BUN SERPL-MCNC: 33 MG/DL — HIGH (ref 7–23)
CALCIUM SERPL-MCNC: 9.3 MG/DL — SIGNIFICANT CHANGE UP (ref 8.5–10.1)
CHLORIDE SERPL-SCNC: 105 MMOL/L — SIGNIFICANT CHANGE UP (ref 96–108)
CO2 SERPL-SCNC: 27 MMOL/L — SIGNIFICANT CHANGE UP (ref 22–31)
COVID-19 NUCLEOCAPSID GAM AB INTERP: NEGATIVE — SIGNIFICANT CHANGE UP
COVID-19 NUCLEOCAPSID TOTAL GAM ANTIBODY RESULT: 0.11 INDEX — SIGNIFICANT CHANGE UP
COVID-19 SPIKE DOMAIN AB INTERP: NEGATIVE — SIGNIFICANT CHANGE UP
COVID-19 SPIKE DOMAIN ANTIBODY RESULT: 0.4 U/ML — SIGNIFICANT CHANGE UP
CREAT SERPL-MCNC: 1.46 MG/DL — HIGH (ref 0.5–1.3)
CREAT SERPL-MCNC: 1.46 MG/DL — HIGH (ref 0.5–1.3)
ESTIMATED AVERAGE GLUCOSE: 120 MG/DL — HIGH (ref 68–114)
GLUCOSE BLDC GLUCOMTR-MCNC: 76 MG/DL — SIGNIFICANT CHANGE UP (ref 70–99)
GLUCOSE BLDC GLUCOMTR-MCNC: 80 MG/DL — SIGNIFICANT CHANGE UP (ref 70–99)
GLUCOSE BLDC GLUCOMTR-MCNC: 90 MG/DL — SIGNIFICANT CHANGE UP (ref 70–99)
GLUCOSE BLDC GLUCOMTR-MCNC: 99 MG/DL — SIGNIFICANT CHANGE UP (ref 70–99)
GLUCOSE SERPL-MCNC: 94 MG/DL — SIGNIFICANT CHANGE UP (ref 70–99)
HCT VFR BLD CALC: 47.7 % — SIGNIFICANT CHANGE UP (ref 39–50)
HGB BLD-MCNC: 15.2 G/DL — SIGNIFICANT CHANGE UP (ref 13–17)
MAGNESIUM SERPL-MCNC: 2.1 MG/DL — SIGNIFICANT CHANGE UP (ref 1.6–2.6)
MCHC RBC-ENTMCNC: 29.7 PG — SIGNIFICANT CHANGE UP (ref 27–34)
MCHC RBC-ENTMCNC: 31.9 GM/DL — LOW (ref 32–36)
MCV RBC AUTO: 93.3 FL — SIGNIFICANT CHANGE UP (ref 80–100)
NRBC # BLD: 0 /100 WBCS — SIGNIFICANT CHANGE UP (ref 0–0)
PHOSPHATE SERPL-MCNC: 3 MG/DL — SIGNIFICANT CHANGE UP (ref 2.5–4.5)
PLATELET # BLD AUTO: 102 K/UL — LOW (ref 150–400)
POTASSIUM SERPL-MCNC: 4.6 MMOL/L — SIGNIFICANT CHANGE UP (ref 3.5–5.3)
POTASSIUM SERPL-SCNC: 4.6 MMOL/L — SIGNIFICANT CHANGE UP (ref 3.5–5.3)
PROT SERPL-MCNC: 7.5 GM/DL — SIGNIFICANT CHANGE UP (ref 6–8.3)
PROT SERPL-MCNC: 7.5 GM/DL — SIGNIFICANT CHANGE UP (ref 6–8.3)
RBC # BLD: 5.11 M/UL — SIGNIFICANT CHANGE UP (ref 4.2–5.8)
RBC # FLD: 14.8 % — HIGH (ref 10.3–14.5)
SARS-COV-2 IGG+IGM SERPL QL IA: 0.11 INDEX — SIGNIFICANT CHANGE UP
SARS-COV-2 IGG+IGM SERPL QL IA: 0.4 U/ML — SIGNIFICANT CHANGE UP
SARS-COV-2 IGG+IGM SERPL QL IA: NEGATIVE — SIGNIFICANT CHANGE UP
SARS-COV-2 IGG+IGM SERPL QL IA: NEGATIVE — SIGNIFICANT CHANGE UP
SODIUM SERPL-SCNC: 140 MMOL/L — SIGNIFICANT CHANGE UP (ref 135–145)
WBC # BLD: 3.96 K/UL — SIGNIFICANT CHANGE UP (ref 3.8–10.5)
WBC # FLD AUTO: 3.96 K/UL — SIGNIFICANT CHANGE UP (ref 3.8–10.5)

## 2021-11-27 PROCEDURE — 99233 SBSQ HOSP IP/OBS HIGH 50: CPT

## 2021-11-27 RX ADMIN — CLOPIDOGREL BISULFATE 75 MILLIGRAM(S): 75 TABLET, FILM COATED ORAL at 11:22

## 2021-11-27 RX ADMIN — ENOXAPARIN SODIUM 40 MILLIGRAM(S): 100 INJECTION SUBCUTANEOUS at 11:23

## 2021-11-27 RX ADMIN — ATORVASTATIN CALCIUM 20 MILLIGRAM(S): 80 TABLET, FILM COATED ORAL at 23:03

## 2021-11-27 NOTE — PROGRESS NOTE ADULT - SUBJECTIVE AND OBJECTIVE BOX
Patient is a 85y old  Male who presents with a chief complaint of COVID, UTI (2021 15:49)      INTERVAL HPI/OVERNIGHT EVENTS: Pt unable to walk- unable to even get up from bed by himself. Family at bedside-Daughter that states that Father has been having problems walking for few weeks now -had xrays performed showing ?herniated disc--complaining of severe pain.   Otherwise still confused -AOx1/2- looking around- denies any pain. Breathing on room air when I entered room.     MEDICATIONS  (STANDING):  atorvastatin 20 milliGRAM(s) Oral at bedtime  clopidogrel Tablet 75 milliGRAM(s) Oral daily  dextrose 40% Gel 15 Gram(s) Oral once  dextrose 5%. 1000 milliLiter(s) (50 mL/Hr) IV Continuous <Continuous>  dextrose 5%. 1000 milliLiter(s) (100 mL/Hr) IV Continuous <Continuous>  dextrose 50% Injectable 25 Gram(s) IV Push once  dextrose 50% Injectable 12.5 Gram(s) IV Push once  dextrose 50% Injectable 25 Gram(s) IV Push once  enoxaparin Injectable 40 milliGRAM(s) SubCutaneous daily  glucagon  Injectable 1 milliGRAM(s) IntraMuscular once  insulin lispro (ADMELOG) corrective regimen sliding scale   SubCutaneous three times a day before meals  insulin lispro (ADMELOG) corrective regimen sliding scale   SubCutaneous at bedtime    MEDICATIONS  (PRN):  acetaminophen     Tablet .. 650 milliGRAM(s) Oral every 6 hours PRN Temp greater or equal to 38C (100.4F), Moderate Pain (4 - 6)      Allergies    No Known Allergies    Intolerances        REVIEW OF SYSTEMS:  as above, rest -unable to obtain detail history due to underlying dementia/confusion    Vital Signs Last 24 Hrs  T(C): 37.3 (2021 12:15), Max: 37.3 (2021 12:15)  T(F): 99.2 (2021 12:15), Max: 99.2 (2021 12:15)  HR: 81 (2021 12:15) (60 - 81)  BP: 119/66 (2021 12:15) (115/64 - 119/79)  BP(mean): --  RR: 18 (2021 12:15) (18 - 20)  SpO2: 94% (2021 12:15) (94% - 99%)    PHYSICAL EXAM:  GENERAL: NAD, well-groomed, well-developed  HEAD:  Atraumatic, Normocephalic  EYES: EOMI, PERRLA, conjunctiva and sclera clear  ENMT: No tonsillar erythema, exudates, or enlargement; Moist mucous membranes, Good dentition, No lesions  NECK: Supple, No JVD, Normal thyroid  NERVOUS SYSTEM:  Alert & Oriented X1/2, no neurological deficits  CHEST/LUNG: Clear to percussion bilaterally; No rales, rhonchi, wheezing, or rubs  HEART: Regular rate and rhythm; No murmurs, rubs, or gallops  ABDOMEN: Soft, Nontender, Nondistended; Bowel sounds present  EXTREMITIES:  2+ Peripheral Pulses, No clubbing, cyanosis, or edema  LYMPH: No lymphadenopathy noted  SKIN: No rashes or lesions    LABS:                        15.2   3.96  )-----------( 102      ( 2021 08:48 )             47.7         140  |  105  |  33<H>  ----------------------------<  94  4.6   |  27  |  1.46<H>    Ca    9.3      2021 08:48  Phos  3.0       Mg     2.1         TPro  7.5  /  Alb  3.2<L>  /  TBili  0.4  /  DBili  0.2  /  AST  121<H>  /  ALT  43  /  AlkPhos  106      PT/INR - ( 2021 22:47 )   PT: 12.8 sec;   INR: 1.11 ratio         PTT - ( 2021 22:47 )  PTT:36.6 sec  Urinalysis Basic - ( 2021 23:57 )    Color: Yellow / Appearance: Slightly Turbid / S.020 / pH: x  Gluc: x / Ketone: Trace  / Bili: Negative / Urobili: 1 mg/dL   Blood: x / Protein: 100 mg/dL / Nitrite: Positive   Leuk Esterase: Trace / RBC: 3-5 /HPF / WBC 6-10   Sq Epi: x / Non Sq Epi: Occasional / Bacteria: Many      CAPILLARY BLOOD GLUCOSE      POCT Blood Glucose.: 99 mg/dL (2021 11:01)  POCT Blood Glucose.: 76 mg/dL (2021 08:26)  POCT Blood Glucose.: 91 mg/dL (2021 22:37)  POCT Blood Glucose.: 108 mg/dL (2021 16:40)      RADIOLOGY & ADDITIONAL TESTS:    Imaging Personally Reviewed:  [ ] YES  [ ] NO    Consultant(s) Notes Reviewed:  [ ] YES  [ ] NO    Care Discussed with Consultants/Other Providers [ ] YES  [ ] NO

## 2021-11-27 NOTE — PROGRESS NOTE ADULT - ASSESSMENT
COVID-19 PNA  -d/esdras dexamethasone and remdesivir, pt on room air now   -improved,  -Tylenol PRN fever  -isolation   -monitor closely    Acute cystitis.   d/c ceftriaxone. vitals stable  -awaiting cultures    Back pain  -unable to walk  -will perform MR of thoracic/lumbar spine   -PT evaluation      BERTHA (acute kidney injury).   - CR 1.7 - unknown baseline  light iv hydration.    CAD (coronary artery disease).   s/p stents few years ago as per daughter  -continue plavix.     Hyperlipidemia.   -continue lipitor.    Dementia  -CT of head neg for hemorrage/ CVA, only chronic ischemic changes    DVT prop: Lovenox 40 daily.    Discussed with sister- interested in patient going to Rehab if cannot walk- [phone]

## 2021-11-28 LAB
ALBUMIN SERPL ELPH-MCNC: 2.8 G/DL — LOW (ref 3.3–5)
ALP SERPL-CCNC: 87 U/L — SIGNIFICANT CHANGE UP (ref 40–120)
ALT FLD-CCNC: 40 U/L — SIGNIFICANT CHANGE UP (ref 12–78)
ANION GAP SERPL CALC-SCNC: 4 MMOL/L — LOW (ref 5–17)
AST SERPL-CCNC: 102 U/L — HIGH (ref 15–37)
BILIRUB DIRECT SERPL-MCNC: 0.2 MG/DL — SIGNIFICANT CHANGE UP (ref 0–0.3)
BILIRUB SERPL-MCNC: 0.3 MG/DL — SIGNIFICANT CHANGE UP (ref 0.2–1.2)
BUN SERPL-MCNC: 32 MG/DL — HIGH (ref 7–23)
CALCIUM SERPL-MCNC: 8.9 MG/DL — SIGNIFICANT CHANGE UP (ref 8.5–10.1)
CHLORIDE SERPL-SCNC: 111 MMOL/L — HIGH (ref 96–108)
CO2 SERPL-SCNC: 28 MMOL/L — SIGNIFICANT CHANGE UP (ref 22–31)
CREAT SERPL-MCNC: 1.22 MG/DL — SIGNIFICANT CHANGE UP (ref 0.5–1.3)
D DIMER BLD IA.RAPID-MCNC: 326 NG/ML DDU — HIGH
GLUCOSE BLDC GLUCOMTR-MCNC: 104 MG/DL — HIGH (ref 70–99)
GLUCOSE BLDC GLUCOMTR-MCNC: 69 MG/DL — LOW (ref 70–99)
GLUCOSE BLDC GLUCOMTR-MCNC: 83 MG/DL — SIGNIFICANT CHANGE UP (ref 70–99)
GLUCOSE BLDC GLUCOMTR-MCNC: 93 MG/DL — SIGNIFICANT CHANGE UP (ref 70–99)
GLUCOSE BLDC GLUCOMTR-MCNC: 96 MG/DL — SIGNIFICANT CHANGE UP (ref 70–99)
GLUCOSE SERPL-MCNC: 77 MG/DL — SIGNIFICANT CHANGE UP (ref 70–99)
HCT VFR BLD CALC: 44.6 % — SIGNIFICANT CHANGE UP (ref 39–50)
HGB BLD-MCNC: 14.1 G/DL — SIGNIFICANT CHANGE UP (ref 13–17)
MAGNESIUM SERPL-MCNC: 2.3 MG/DL — SIGNIFICANT CHANGE UP (ref 1.6–2.6)
MCHC RBC-ENTMCNC: 29.7 PG — SIGNIFICANT CHANGE UP (ref 27–34)
MCHC RBC-ENTMCNC: 31.6 GM/DL — LOW (ref 32–36)
MCV RBC AUTO: 93.9 FL — SIGNIFICANT CHANGE UP (ref 80–100)
NRBC # BLD: 0 /100 WBCS — SIGNIFICANT CHANGE UP (ref 0–0)
PLATELET # BLD AUTO: 95 K/UL — LOW (ref 150–400)
POTASSIUM SERPL-MCNC: 4.5 MMOL/L — SIGNIFICANT CHANGE UP (ref 3.5–5.3)
POTASSIUM SERPL-SCNC: 4.5 MMOL/L — SIGNIFICANT CHANGE UP (ref 3.5–5.3)
PROT SERPL-MCNC: 6.4 GM/DL — SIGNIFICANT CHANGE UP (ref 6–8.3)
RBC # BLD: 4.75 M/UL — SIGNIFICANT CHANGE UP (ref 4.2–5.8)
RBC # FLD: 14.9 % — HIGH (ref 10.3–14.5)
SODIUM SERPL-SCNC: 143 MMOL/L — SIGNIFICANT CHANGE UP (ref 135–145)
WBC # BLD: 2.93 K/UL — LOW (ref 3.8–10.5)
WBC # FLD AUTO: 2.93 K/UL — LOW (ref 3.8–10.5)

## 2021-11-28 PROCEDURE — 99233 SBSQ HOSP IP/OBS HIGH 50: CPT

## 2021-11-28 RX ORDER — CEFTRIAXONE 500 MG/1
1000 INJECTION, POWDER, FOR SOLUTION INTRAMUSCULAR; INTRAVENOUS EVERY 24 HOURS
Refills: 0 | Status: COMPLETED | OUTPATIENT
Start: 2021-11-29 | End: 2021-11-30

## 2021-11-28 RX ORDER — CEFTRIAXONE 500 MG/1
INJECTION, POWDER, FOR SOLUTION INTRAMUSCULAR; INTRAVENOUS
Refills: 0 | Status: COMPLETED | OUTPATIENT
Start: 2021-11-28 | End: 2021-11-30

## 2021-11-28 RX ORDER — CEFTRIAXONE 500 MG/1
1000 INJECTION, POWDER, FOR SOLUTION INTRAMUSCULAR; INTRAVENOUS ONCE
Refills: 0 | Status: COMPLETED | OUTPATIENT
Start: 2021-11-28 | End: 2021-11-28

## 2021-11-28 RX ORDER — DEXTROSE 50 % IN WATER 50 %
15 SYRINGE (ML) INTRAVENOUS ONCE
Refills: 0 | Status: COMPLETED | OUTPATIENT
Start: 2021-11-28 | End: 2021-11-28

## 2021-11-28 RX ADMIN — ENOXAPARIN SODIUM 40 MILLIGRAM(S): 100 INJECTION SUBCUTANEOUS at 11:34

## 2021-11-28 RX ADMIN — CLOPIDOGREL BISULFATE 75 MILLIGRAM(S): 75 TABLET, FILM COATED ORAL at 11:34

## 2021-11-28 RX ADMIN — Medication 15 GRAM(S): at 08:16

## 2021-11-28 RX ADMIN — CEFTRIAXONE 100 MILLIGRAM(S): 500 INJECTION, POWDER, FOR SOLUTION INTRAMUSCULAR; INTRAVENOUS at 17:04

## 2021-11-28 NOTE — PHYSICAL THERAPY INITIAL EVALUATION ADULT - PERTINENT HX OF CURRENT PROBLEM, REHAB EVAL
Patient is an 85M with a PMH of dementia, CAD, HLD who presents to [facility] secondary to COVID-19.

## 2021-11-28 NOTE — PHYSICAL THERAPY INITIAL EVALUATION ADULT - IMPAIRMENTS FOUND, PT EVAL
aerobic capacity/endurance/decreased midline orientation/ergonomics and body mechanics/gait, locomotion, and balance/muscle strength/posture

## 2021-11-28 NOTE — PROGRESS NOTE ADULT - SUBJECTIVE AND OBJECTIVE BOX
Patient is a 85y old  Male who presents with a chief complaint of COVID, UTI (26 Nov 2021 15:49)      INTERVAL HPI/OVERNIGHT EVENTS: As per nursing- pt walked with PT, still confused. Otherwise doing well- breathing on room air.  No complaints.     MEDICATIONS  (STANDING):  atorvastatin 20 milliGRAM(s) Oral at bedtime  clopidogrel Tablet 75 milliGRAM(s) Oral daily  dextrose 40% Gel 15 Gram(s) Oral once  dextrose 5%. 1000 milliLiter(s) (50 mL/Hr) IV Continuous <Continuous>  dextrose 5%. 1000 milliLiter(s) (100 mL/Hr) IV Continuous <Continuous>  dextrose 50% Injectable 25 Gram(s) IV Push once  dextrose 50% Injectable 12.5 Gram(s) IV Push once  dextrose 50% Injectable 25 Gram(s) IV Push once  enoxaparin Injectable 40 milliGRAM(s) SubCutaneous daily  glucagon  Injectable 1 milliGRAM(s) IntraMuscular once  insulin lispro (ADMELOG) corrective regimen sliding scale   SubCutaneous three times a day before meals  insulin lispro (ADMELOG) corrective regimen sliding scale   SubCutaneous at bedtime    MEDICATIONS  (PRN):  acetaminophen     Tablet .. 650 milliGRAM(s) Oral every 6 hours PRN Temp greater or equal to 38C (100.4F), Moderate Pain (4 - 6)      Allergies    No Known Allergies    Intolerances        REVIEW OF SYSTEMS:  as above, rest -unable to obtain detail history due to underlying dementia/confusion    ICU Vital Signs Last 24 Hrs  T(C): 37.1 (28 Nov 2021 14:32), Max: 37.1 (27 Nov 2021 16:45)  T(F): 98.7 (28 Nov 2021 14:32), Max: 98.8 (27 Nov 2021 16:45)  HR: 83 (28 Nov 2021 14:32) (66 - 84)  BP: 124/72 (28 Nov 2021 14:32) (108/67 - 139/78)  BP(mean): --  ABP: --  ABP(mean): --  RR: 17 (28 Nov 2021 14:32) (17 - 18)  SpO2: 97% (28 Nov 2021 14:32) (91% - 97%)      PHYSICAL EXAM:  GENERAL: NAD, well-groomed, well-developed  HEAD:  Atraumatic, Normocephalic  EYES: EOMI, PERRLA, conjunctiva and sclera clear  ENMT: No tonsillar erythema, exudates, or enlargement; Moist mucous membranes, Good dentition, No lesions  NECK: Supple, No JVD, Normal thyroid  NERVOUS SYSTEM:  Alert & Oriented X1/2, no neurological deficits  CHEST/LUNG: Clear to percussion bilaterally; No rales, rhonchi, wheezing, or rubs  HEART: Regular rate and rhythm; No murmurs, rubs, or gallops  ABDOMEN: Soft, Nontender, Nondistended; Bowel sounds present  EXTREMITIES:  2+ Peripheral Pulses, No clubbing, cyanosis, or edema  LYMPH: No lymphadenopathy noted  SKIN: No rashes or lesions    LABS:                          14.1   2.93  )-----------( 95       ( 28 Nov 2021 07:54 )             44.6     11-28    143  |  111<H>  |  32<H>  ----------------------------<  77  4.5   |  28  |  1.22    Ca    8.9      28 Nov 2021 07:54  Phos  3.0     11-27  Mg     2.3     11-28    TPro  6.4  /  Alb  2.8<L>  /  TBili  0.3  /  DBili  0.2  /  AST  102<H>  /  ALT  40  /  AlkPhos  87  11-28

## 2021-11-28 NOTE — PROGRESS NOTE ADULT - ASSESSMENT
COVID-19 PNA  -d/esdras dexamethasone and remdesivir, pt on room air now   -improved,  -Tylenol PRN fever  -isolation   -monitor closely    Acute cystitis.   d/c ceftriaxone. vitals stable  -awaiting cultures    Back pain  -unable to walk  -will perform MR of thoracic/lumbar spine --pending  -PT evaluation      BERTHA (acute kidney injury).   - CR 1.7 - unknown baseline  light iv hydration.    CAD (coronary artery disease).   s/p stents few years ago as per daughter  -continue plavix.     Hyperlipidemia.   -continue lipitor.    Dementia  -CT of head neg for hemorrage/ CVA, only chronic ischemic changes    DVT prop: Lovenox 40 daily.    Discussed with sister- interested in patient going to Rehab if cannot walk- [phone] COVID-19 PNA  -d/esdras dexamethasone and remdesivir, pt on room air now   -improved,  -Tylenol PRN fever  -isolation   -monitor closely    Acute cystitis.   d/c ceftriaxone. vitals stable  -awaiting cultures    Back pain   MR of thoracic/lumbar spine --pending  -PT evaluation -appreciate recs-     BERTHA (acute kidney injury).   - CR 1.7 - unknown baseline  light iv hydration.    CAD (coronary artery disease).   s/p stents few years ago as per daughter  -continue plavix.     Hyperlipidemia.   -continue lipitor.    Dementia  -CT of head neg for hemorrage/ CVA, only chronic ischemic changes    DVT prop: Lovenox 40 daily.    Discussed with sister- interested in patient going to Rehab if cannot walk- [phone]. As per PT- pt qualifies for Subacute Rehab COVID-19 PNA  -d/esdras dexamethasone and remdesivir, pt on room air now   -improved,  -Tylenol PRN fever  -isolation   -monitor closely    Acute cystitis.   d/c ceftriaxone. vitals stable  -culture growing E.coli now, pt still confused-as per family was not as confused at home-will re-start Ceftriaxone    Back pain   MR of thoracic/lumbar spine --pending  -PT evaluation -appreciate recs-     BERTHA (acute kidney injury).   - CR 1.7 - unknown baseline  light iv hydration.    CAD (coronary artery disease).   s/p stents few years ago as per daughter  -continue plavix.     Hyperlipidemia.   -continue lipitor.    Dementia  -CT of head neg for hemorrage/ CVA, only chronic ischemic changes    DVT prop: Lovenox 40 daily.    Discussed with sister- interested in patient going to Rehab if cannot walk- [phone]. As per PT- pt qualifies for Subacute Rehab

## 2021-11-28 NOTE — PHYSICAL THERAPY INITIAL EVALUATION ADULT - ADDITIONAL COMMENTS
Pt states he lives in a  with 3 NICOLE with 1 HR, and bed/bathroom are on the 2nd floor. Pt states he lives alone however his children check in on him. Pt states he is independent with mobility and ADL's however owns a rolling walker if needed.

## 2021-11-29 LAB
-  AMIKACIN: SIGNIFICANT CHANGE UP
-  AMOXICILLIN/CLAVULANIC ACID: SIGNIFICANT CHANGE UP
-  AMPICILLIN/SULBACTAM: SIGNIFICANT CHANGE UP
-  AMPICILLIN: SIGNIFICANT CHANGE UP
-  AZTREONAM: SIGNIFICANT CHANGE UP
-  CEFAZOLIN: SIGNIFICANT CHANGE UP
-  CEFEPIME: SIGNIFICANT CHANGE UP
-  CEFOXITIN: SIGNIFICANT CHANGE UP
-  CEFTRIAXONE: SIGNIFICANT CHANGE UP
-  CIPROFLOXACIN: SIGNIFICANT CHANGE UP
-  ERTAPENEM: SIGNIFICANT CHANGE UP
-  GENTAMICIN: SIGNIFICANT CHANGE UP
-  IMIPENEM: SIGNIFICANT CHANGE UP
-  LEVOFLOXACIN: SIGNIFICANT CHANGE UP
-  MEROPENEM: SIGNIFICANT CHANGE UP
-  NITROFURANTOIN: SIGNIFICANT CHANGE UP
-  PIPERACILLIN/TAZOBACTAM: SIGNIFICANT CHANGE UP
-  TIGECYCLINE: SIGNIFICANT CHANGE UP
-  TOBRAMYCIN: SIGNIFICANT CHANGE UP
-  TRIMETHOPRIM/SULFAMETHOXAZOLE: SIGNIFICANT CHANGE UP
ALBUMIN SERPL ELPH-MCNC: 2.7 G/DL — LOW (ref 3.3–5)
ALP SERPL-CCNC: 97 U/L — SIGNIFICANT CHANGE UP (ref 40–120)
ALT FLD-CCNC: 40 U/L — SIGNIFICANT CHANGE UP (ref 12–78)
AST SERPL-CCNC: 83 U/L — HIGH (ref 15–37)
BILIRUB DIRECT SERPL-MCNC: 0.2 MG/DL — SIGNIFICANT CHANGE UP (ref 0–0.3)
BILIRUB INDIRECT FLD-MCNC: 0.2 MG/DL — SIGNIFICANT CHANGE UP (ref 0.2–1)
BILIRUB SERPL-MCNC: 0.4 MG/DL — SIGNIFICANT CHANGE UP (ref 0.2–1.2)
CREAT SERPL-MCNC: 1.1 MG/DL — SIGNIFICANT CHANGE UP (ref 0.5–1.3)
CULTURE RESULTS: SIGNIFICANT CHANGE UP
GLUCOSE BLDC GLUCOMTR-MCNC: 81 MG/DL — SIGNIFICANT CHANGE UP (ref 70–99)
GLUCOSE BLDC GLUCOMTR-MCNC: 84 MG/DL — SIGNIFICANT CHANGE UP (ref 70–99)
GLUCOSE BLDC GLUCOMTR-MCNC: 85 MG/DL — SIGNIFICANT CHANGE UP (ref 70–99)
GLUCOSE BLDC GLUCOMTR-MCNC: 85 MG/DL — SIGNIFICANT CHANGE UP (ref 70–99)
METHOD TYPE: SIGNIFICANT CHANGE UP
ORGANISM # SPEC MICROSCOPIC CNT: SIGNIFICANT CHANGE UP
ORGANISM # SPEC MICROSCOPIC CNT: SIGNIFICANT CHANGE UP
PROT SERPL-MCNC: 6.3 GM/DL — SIGNIFICANT CHANGE UP (ref 6–8.3)
SPECIMEN SOURCE: SIGNIFICANT CHANGE UP

## 2021-11-29 PROCEDURE — 72148 MRI LUMBAR SPINE W/O DYE: CPT | Mod: 26

## 2021-11-29 PROCEDURE — 72146 MRI CHEST SPINE W/O DYE: CPT | Mod: 26

## 2021-11-29 PROCEDURE — 99232 SBSQ HOSP IP/OBS MODERATE 35: CPT

## 2021-11-29 RX ADMIN — CEFTRIAXONE 100 MILLIGRAM(S): 500 INJECTION, POWDER, FOR SOLUTION INTRAMUSCULAR; INTRAVENOUS at 17:42

## 2021-11-29 RX ADMIN — ATORVASTATIN CALCIUM 20 MILLIGRAM(S): 80 TABLET, FILM COATED ORAL at 21:56

## 2021-11-29 RX ADMIN — ENOXAPARIN SODIUM 40 MILLIGRAM(S): 100 INJECTION SUBCUTANEOUS at 12:20

## 2021-11-29 NOTE — PROGRESS NOTE ADULT - ASSESSMENT
COVID-19 PNA  - Not on treatment as pt on room air now   -Tylenol PRN fever  -isolation   -monitor closely    Acute cystitis.   -culture growing E.coli now, pt still confused-as per family was not as confused at home-will re-start Ceftriaxone    Back pain   MR of thoracic/lumbar spine --pending  -PT evaluation - FAR[facility]AT     BERTHA    - improved    CAD  s/p stents few years ago as per daughter  -continue plavix, statin     Hyperlipidemia.   -continue lipitor.    DVT prop: Lovenox 40 daily.

## 2021-11-29 NOTE — PROGRESS NOTE ADULT - SUBJECTIVE AND OBJECTIVE BOX
INTERVAL HPI/OVERNIGHT EVENTS: Pt seen and examined at bedside. Denies any complaints. No events overnight.    85y  Vital Signs Last 24 Hrs  T(C): 37.2 (29 Nov 2021 11:48), Max: 37.2 (29 Nov 2021 11:48)  T(F): 98.9 (29 Nov 2021 11:48), Max: 98.9 (29 Nov 2021 11:48)  HR: 75 (29 Nov 2021 11:48) (68 - 78)  BP: 145/80 (29 Nov 2021 11:48) (125/75 - 146/75)  BP(mean): --  RR: 18 (29 Nov 2021 11:48) (16 - 18)  SpO2: 94% (29 Nov 2021 11:48) (92% - 100%)  I&O's Summary    28 Nov 2021 07:01  -  29 Nov 2021 07:00  --------------------------------------------------------  IN: 50 mL / OUT: 0 mL / NET: 50 mL      MEDICATIONS  (STANDING):  atorvastatin 20 milliGRAM(s) Oral at bedtime  cefTRIAXone   IVPB      cefTRIAXone   IVPB 1000 milliGRAM(s) IV Intermittent every 24 hours  clopidogrel Tablet 75 milliGRAM(s) Oral daily  dextrose 40% Gel 15 Gram(s) Oral once  dextrose 5%. 1000 milliLiter(s) (50 mL/Hr) IV Continuous <Continuous>  dextrose 5%. 1000 milliLiter(s) (100 mL/Hr) IV Continuous <Continuous>  dextrose 50% Injectable 25 Gram(s) IV Push once  dextrose 50% Injectable 12.5 Gram(s) IV Push once  dextrose 50% Injectable 25 Gram(s) IV Push once  enoxaparin Injectable 40 milliGRAM(s) SubCutaneous daily  glucagon  Injectable 1 milliGRAM(s) IntraMuscular once  insulin lispro (ADMELOG) corrective regimen sliding scale   SubCutaneous three times a day before meals  insulin lispro (ADMELOG) corrective regimen sliding scale   SubCutaneous at bedtime    MEDICATIONS  (PRN):  acetaminophen     Tablet .. 650 milliGRAM(s) Oral every 6 hours PRN Temp greater or equal to 38C (100.4F), Moderate Pain (4 - 6)    LABS:    trop                        14.1   2.93  )-----------( 95       ( 28 Nov 2021 07:54 )             44.6     11-29    x   |  x   |  x   ----------------------------<  x   x    |  x   |  1.10    Ca    8.9      28 Nov 2021 07:54  Mg     2.3     11-28    TPro  6.3  /  Alb  2.7<L>  /  TBili  0.4  /  DBili  0.2  /  AST  83<H>  /  ALT  40  /  AlkPhos  97  11-29        CAPILLARY BLOOD GLUCOSE      POCT Blood Glucose.: 85 mg/dL (29 Nov 2021 12:03)  POCT Blood Glucose.: 81 mg/dL (29 Nov 2021 08:21)  POCT Blood Glucose.: 104 mg/dL (28 Nov 2021 21:35)  POCT Blood Glucose.: 83 mg/dL (28 Nov 2021 17:10)          REVIEW OF SYSTEMS:  CONSTITUTIONAL: No fever, weight loss, or fatigue  RESPIRATORY: No cough, wheezing, chills or hemoptysis; No shortness of breath  CARDIOVASCULAR: No chest pain, palpitations, dizziness, or leg swelling  GASTROINTESTINAL: No abdominal or epigastric pain. No nausea, vomiting, or hematemesis; No diarrhea or constipation. No melena or hematochezia.  GENITOURINARY: No dysuria, frequency, hematuria, or incontinence  NEUROLOGICAL: No headaches, memory loss, loss of strength, numbness, or tremors  MUSCULOSKELETAL: No joint pain or swelling; No muscle, back, or extremity pain      RADIOLOGY & ADDITIONAL TESTS:    Imaging Personally Reviewed:  [ ] YES  [ ] NO    Consultant(s) Notes Reviewed:  [x ] YES  [ ] NO    PHYSICAL EXAM:  GENERAL: NAD  NERVOUS SYSTEM:  Alert & Oriented X1, moves all extremities.  CHEST/LUNG: Clear to auscultation b/l.  HEART: Regular rate and rhythm; No murmurs, rubs, or gallops  ABDOMEN: Soft, Nontender, Nondistended; Bowel sounds present  EXTREMITIES:  2+ Peripheral Pulses, No clubbing, cyanosis, or edema      A & P:        Care Discussed with Consultants/Other Providers [ x] YES  [ ] NO

## 2021-11-30 LAB
GLUCOSE BLDC GLUCOMTR-MCNC: 104 MG/DL — HIGH (ref 70–99)
GLUCOSE BLDC GLUCOMTR-MCNC: 119 MG/DL — HIGH (ref 70–99)
GLUCOSE BLDC GLUCOMTR-MCNC: 82 MG/DL — SIGNIFICANT CHANGE UP (ref 70–99)
GLUCOSE BLDC GLUCOMTR-MCNC: 95 MG/DL — SIGNIFICANT CHANGE UP (ref 70–99)

## 2021-11-30 PROCEDURE — 99232 SBSQ HOSP IP/OBS MODERATE 35: CPT

## 2021-11-30 RX ADMIN — ENOXAPARIN SODIUM 40 MILLIGRAM(S): 100 INJECTION SUBCUTANEOUS at 12:13

## 2021-11-30 RX ADMIN — CLOPIDOGREL BISULFATE 75 MILLIGRAM(S): 75 TABLET, FILM COATED ORAL at 12:13

## 2021-11-30 RX ADMIN — CEFTRIAXONE 100 MILLIGRAM(S): 500 INJECTION, POWDER, FOR SOLUTION INTRAMUSCULAR; INTRAVENOUS at 15:51

## 2021-11-30 NOTE — PROGRESS NOTE ADULT - ASSESSMENT
85M with a PMH of dementia, CAD, HLD who presents to the ED for medical evaluation.    Patient had fever and initially thought to have low oxygen level though most room air documentation seems to be above 94%   today he was taken off oxygen and saturation remains consistently above 94%  fever has resolved   UA only mildly possible with 6-10 WBC and he denies any urinary symptoms  if not requiring oxygen he does not need RDV or dexamethasone  additionally I do not believe he has a UTI and his fever is more likely related to covid   would stop antibiotics, rdv and dex     11/30: no fevers since 11/25, remains on RA, no new lab work, blood cultures remain with no growth, UC grew E. Coli, pt denies any urinary symptoms at the moment, was started on ceftriaxone day #3 by the medical staff.     Plan  No role for remdesivir and dexamethasone, pt is on RA   stop ceftriaxone after today's dose  follow cultures  continue to check vitals and if things change can restart rdv and dex   keep on contact and airborne precautions   trend fevers   trend wbc   quarantine x 10 days from pt's COVID 19 diagnosis     Msg sent to Dr. Torres

## 2021-11-30 NOTE — PROGRESS NOTE ADULT - SUBJECTIVE AND OBJECTIVE BOX
BERTHA HICKS  MRN-[id number]    Follow Up:  COVID 19    Interval History: The pt was seen and examined earlier, out of bed to chair, no distress, remains on RA, has no new complains. The pt is afebrile, no new lab work.     PAST MEDICAL & SURGICAL HISTORY:  Hyperlipidemia        ROS:    [ ] Unobtainable because:  [x ] All other systems negative    Constitutional: no fever, no chills  Head: no trauma  Eyes: no vision changes, no eye pain  ENT:  no sore throat, no rhinorrhea  Cardiovascular:  no chest pain, no palpitation  Respiratory:  no SOB, no cough  GI:  no abd pain, no vomiting, no diarrhea  urinary: no dysuria, no hematuria, no flank pain  musculoskeletal:  no joint pain, no joint swelling  skin:  no rash  neurology:  no headache, no seizure, no change in mental status  psych: no anxiety, no depression         Allergies  No Known Allergies        ANTIMICROBIALS:  cefTRIAXone   IVPB    cefTRIAXone   IVPB 1000 every 24 hours      OTHER MEDS:  acetaminophen     Tablet .. 650 milliGRAM(s) Oral every 6 hours PRN  atorvastatin 20 milliGRAM(s) Oral at bedtime  clopidogrel Tablet 75 milliGRAM(s) Oral daily  dextrose 40% Gel 15 Gram(s) Oral once  dextrose 5%. 1000 milliLiter(s) IV Continuous <Continuous>  dextrose 5%. 1000 milliLiter(s) IV Continuous <Continuous>  dextrose 50% Injectable 25 Gram(s) IV Push once  dextrose 50% Injectable 12.5 Gram(s) IV Push once  dextrose 50% Injectable 25 Gram(s) IV Push once  enoxaparin Injectable 40 milliGRAM(s) SubCutaneous daily  glucagon  Injectable 1 milliGRAM(s) IntraMuscular once  insulin lispro (ADMELOG) corrective regimen sliding scale   SubCutaneous three times a day before meals  insulin lispro (ADMELOG) corrective regimen sliding scale   SubCutaneous at bedtime      Vital Signs Last 24 Hrs  T(C): 36.9 (30 Nov 2021 11:18), Max: 37.3 (30 Nov 2021 06:02)  T(F): 98.4 (30 Nov 2021 11:18), Max: 99.2 (30 Nov 2021 06:02)  HR: 94 (30 Nov 2021 11:18) (73 - 96)  BP: 92/53 (30 Nov 2021 11:18) (92/53 - 143/96)  BP(mean): --  RR: 18 (30 Nov 2021 11:18) (18 - 18)  SpO2: 92% (30 Nov 2021 11:18) (91% - 96%)    Physical Exam:  Constitutional: non-toxic, no distress, on RA   HEAD/EYES: anicteric, no conjunctival injection  ENT:  supple, no thrush  Cardiovascular:   normal S1, S2, no murmur, no edema  Respiratory:  clear BS bilaterally, no wheezes, no rales  GI:  soft, non-tender, normal bowel sounds  :  no nelson, no CVA tenderness  Musculoskeletal:  no synovitis, normal ROM  Neurologic: awake and alert to person and place, unable to state the year   Skin:  no rash, no erythema, no phlebitis  Heme/Onc: no lymphadenopathy   Psychiatric:  awake, alert, appropriate mood    WBC Count: 2.93 K/uL (11-28 @ 07:54)  WBC Count: 3.96 K/uL (11-27 @ 08:48)  WBC Count: 4.37 K/uL (11-25 @ 22:25)          11-29    x   |  x   |  x   ----------------------------<  x   x    |  x   |  1.10      TPro  6.3  /  Alb  2.7<L>  /  TBili  0.4  /  DBili  0.2  /  AST  83<H>  /  ALT  40  /  AlkPhos  97  11-29          Creatinine Trend: 1.10<--, 1.22<--, 1.46<--, 1.35<--, 1.79<--      MICROBIOLOGY:  v  .Blood Blood-Peripheral  11-26-21   No growth to date.  --  --      Clean Catch Clean Catch (Midstream)  11-26-21   50,000 - 99,000 CFU/mL Escherichia coli  --  Escherichia coli    D-Dimer Assay, Quantitative: 326 (11-28)  D-Dimer Assay, Quantitative: 222 (11-25)    Procalcitonin, Serum: 0.30 (11-26-21 @ 08:04)      RADIOLOGY:    < from: MR Thoracic Spine No Cont (11.29.21 @ 13:29) >    EXAM:  MR SPINE THORACIC                            PROCEDURE DATE:  11/29/2021          INTERPRETATION:  INDICATION:  Back pain.  TECHNIQUE:  Multiplanar thoracic imaging was conducted using a 1.5 Karoline magnet.  T1 and T2 techniques were incorporated.    FINDINGS:    ALIGNMENT:  A mild thoracolumbar scoliosis is noted.  VERTEBRAL BODIES:  There is marrow edema and discogenic endplate changes at T7-8 and T8-9 as well as from T1 to T4. No acute fracture is identified.  DISC SPACES:  Multiple disc bulges are noted along with a disc degeneration. A small protrusion is noted at T7-T8. No tight stenosis noted.  SPINAL CORD:  No cord edema or changes of myelopathy are noted.  INTRADURAL:  No intradural extramedullary lesion is depicted.  MISCELLANEOUS:  Advanced degenerative changes also noted in the cervical spine.      IMPRESSION:  Multilevel degenerative changes but without significant stenosis. No cord edema or changes of myelopathy.    --- End of Report ---            CRYS PETER MD; Attending Radiologist  This document has been electronically signed. Nov 29 2021  1:48PM    < end of copied text >    < from: MR Lumbar Spine No Cont (11.29.21 @ 13:29) >    EXAM:  MR SPINE LUMBAR                            PROCEDURE DATE:  11/29/2021          INTERPRETATION:  INDICATION: Severe back pain. Radiculopathy.  TECHNIQUE:  Multiplanar lumbar imaging was conducted using a 1.5 Karoline magnet.  T1 and T2 techniques were incorporated.  COMPARISON EXAMINATION:  No prior    FINDINGS:  ALIGNMENT:  There are multiple subluxations with degenerative changes. There is a a spondylolisthesis at L4-5 and L5-S1, with retrolisthesis at L2-3 and L3-4. There is an underlying mild thoracolumbar scoliosis..  VERTEBRAL BODIES:  There are discogenic endplate changes from L1 to S1. No acute fracture or destructive lesion is noted.  DISC SPACES: Disc spaces are severely narrowed with chronic endplate changes from L1 to S1.  Lower Thoracic: No disc herniation is noted.  L1-2:   A diffuse bulge is noted more prevalent to the left. Facets and ligaments are mildly thickened.  L2-3:   A a broad-based herniation is noted with hypertrophic changes of facets and ligaments. There is diffuse central and foraminal narrowing.  L3-4:   A broad-based herniation is noted with hypertrophic changes. There is diffuse central and foraminal narrowing.  L4-5:  A right paracentral herniation is noted with underlying spondylolisthesis and hypertrophic degenerative changes. There is diffuse canal and foraminal narrowing.  L5-S1:   A herniation is identified along with hypertrophic degenerative changes and spondylolisthesis.  POSTERIOR ELEMENTS:  Hypertrophic changes are noted throughout the lumbosacral region.  CANAL AND FORAMINA:  There is severe stenosis at L5-S1, moderate stenosis at L4-5, and mild stenosis at L2-3 and L3-4. Also there is severe foraminal narrowing throughout the mid and lower lumbar region appear  INTRADURAL SPACE:  No intradural abnormality.  The distal cord is unremarkable in contour and signal.  MISCELLANEOUS:     There is a very large right renal cyst that, measuring 9 cm in diameter. This may be further assessed sonographically.    IMPRESSION:      1. Extensive degenerative changes with underlying thoracolumbar scoliosis. Multiple subluxations along with disc disease and hypertrophic changes of the posterior elements.  2. Severe foraminal narrowing at multiple levels, with moderate to severe central and recess narrowing in the mid and lower lumbar region. See above.    --- End of Report ---      CRYS PETER MD; Attending Radiologist  This document has been electronically signed. Nov 29 2021  1:46PM    < end of copied text >

## 2021-11-30 NOTE — PROGRESS NOTE ADULT - SUBJECTIVE AND OBJECTIVE BOX
INTERVAL HPI/OVERNIGHT EVENTS: Pt seen and examined at bedside. Denies any complaints. No events overnight.    85y  Vital Signs Last 24 Hrs  T(C): 36.9 (30 Nov 2021 11:18), Max: 37.3 (30 Nov 2021 06:02)  T(F): 98.4 (30 Nov 2021 11:18), Max: 99.2 (30 Nov 2021 06:02)  HR: 94 (30 Nov 2021 11:18) (73 - 96)  BP: 92/53 (30 Nov 2021 11:18) (92/53 - 143/96)  BP(mean): --  RR: 18 (30 Nov 2021 11:18) (18 - 18)  SpO2: 92% (30 Nov 2021 11:18) (91% - 96%)  I&O's Summary    29 Nov 2021 07:01  -  30 Nov 2021 07:00  --------------------------------------------------------  IN: 240 mL / OUT: 0 mL / NET: 240 mL      MEDICATIONS  (STANDING):  atorvastatin 20 milliGRAM(s) Oral at bedtime  cefTRIAXone   IVPB      cefTRIAXone   IVPB 1000 milliGRAM(s) IV Intermittent every 24 hours  clopidogrel Tablet 75 milliGRAM(s) Oral daily  dextrose 40% Gel 15 Gram(s) Oral once  dextrose 5%. 1000 milliLiter(s) (50 mL/Hr) IV Continuous <Continuous>  dextrose 5%. 1000 milliLiter(s) (100 mL/Hr) IV Continuous <Continuous>  dextrose 50% Injectable 25 Gram(s) IV Push once  dextrose 50% Injectable 12.5 Gram(s) IV Push once  dextrose 50% Injectable 25 Gram(s) IV Push once  enoxaparin Injectable 40 milliGRAM(s) SubCutaneous daily  glucagon  Injectable 1 milliGRAM(s) IntraMuscular once  insulin lispro (ADMELOG) corrective regimen sliding scale   SubCutaneous three times a day before meals  insulin lispro (ADMELOG) corrective regimen sliding scale   SubCutaneous at bedtime    MEDICATIONS  (PRN):  acetaminophen     Tablet .. 650 milliGRAM(s) Oral every 6 hours PRN Temp greater or equal to 38C (100.4F), Moderate Pain (4 - 6)    LABS:    trop    11-29    x   |  x   |  x   ----------------------------<  x   x    |  x   |  1.10      TPro  6.3  /  Alb  2.7<L>  /  TBili  0.4  /  DBili  0.2  /  AST  83<H>  /  ALT  40  /  AlkPhos  97  11-29        CAPILLARY BLOOD GLUCOSE      POCT Blood Glucose.: 104 mg/dL (30 Nov 2021 12:05)  POCT Blood Glucose.: 82 mg/dL (30 Nov 2021 08:16)  POCT Blood Glucose.: 85 mg/dL (29 Nov 2021 22:10)  POCT Blood Glucose.: 84 mg/dL (29 Nov 2021 17:35)          REVIEW OF SYSTEMS:  CONSTITUTIONAL: No fever, weight loss, or fatigue  RESPIRATORY: No cough, wheezing, chills or hemoptysis; No shortness of breath  CARDIOVASCULAR: No chest pain, palpitations, dizziness, or leg swelling  GASTROINTESTINAL: No abdominal or epigastric pain. No nausea, vomiting, or hematemesis; No diarrhea or constipation. No melena or hematochezia.  GENITOURINARY: No dysuria, frequency, hematuria, or incontinence  NEUROLOGICAL: No headaches, memory loss, loss of strength, numbness, or tremors  MUSCULOSKELETAL: No joint pain or swelling; No muscle, back, or extremity pain    RADIOLOGY & ADDITIONAL TESTS:    Imaging Personally Reviewed:  [ ] YES  [ ] NO    Consultant(s) Notes Reviewed:  [x ] YES  [ ] NO    PHYSICAL EXAM:  GENERAL: NAD, well-groomed, well-developed  NERVOUS SYSTEM:  Alert & Oriented X1, moves all extremities.  CHEST/LUNG: Clear to auscultation b/l.  HEART: Regular rate and rhythm; No murmurs, rubs, or gallops  ABDOMEN: Soft, Nontender, Nondistended; Bowel sounds present  EXTREMITIES:  2+ Peripheral Pulses, No clubbing, cyanosis, or edema      A & P:        Care Discussed with Consultants/Other Providers [ x] YES  [ ] NO

## 2021-11-30 NOTE — PROGRESS NOTE ADULT - ASSESSMENT
85 y o M with PMH of Dementia, CAD, DLD presented with generalized weakness, fever, myalgias.    COVID-19 PNA  - Not on treatment as pt on room air now   -Tylenol PRN fever  -isolation   -monitor closely    Acute cystitis.   -culture growing E.coli.  - c/w rocephin.    Back pain   MR of thoracic/lumbar spine 1. Extensive degenerative changes with underlying thoracolumbar scoliosis. Multiple subluxations along with disc disease and hypertrophic changes of the posterior elements.  2. Severe foraminal narrowing at multiple levels, with moderate to severe central and recess narrowing in the mid and lower lumbar region.  Ortho eval.  -PT evaluation - F[facility]     BERTHA    - improved    CAD  s/p stents few years ago as per daughter  -continue plavix, statin     Hyperlipidemia.   -continue lipitor.    DVT prop: Lovenox 40 daily.

## 2021-12-01 LAB
ALBUMIN SERPL ELPH-MCNC: 2.6 G/DL — LOW (ref 3.3–5)
ALP SERPL-CCNC: 91 U/L — SIGNIFICANT CHANGE UP (ref 40–120)
ALT FLD-CCNC: 37 U/L — SIGNIFICANT CHANGE UP (ref 12–78)
ANION GAP SERPL CALC-SCNC: 5 MMOL/L — SIGNIFICANT CHANGE UP (ref 5–17)
AST SERPL-CCNC: 78 U/L — HIGH (ref 15–37)
BILIRUB DIRECT SERPL-MCNC: 0.2 MG/DL — SIGNIFICANT CHANGE UP (ref 0–0.3)
BILIRUB INDIRECT FLD-MCNC: 0.2 MG/DL — SIGNIFICANT CHANGE UP (ref 0.2–1)
BILIRUB SERPL-MCNC: 0.4 MG/DL — SIGNIFICANT CHANGE UP (ref 0.2–1.2)
BUN SERPL-MCNC: 29 MG/DL — HIGH (ref 7–23)
CALCIUM SERPL-MCNC: 9 MG/DL — SIGNIFICANT CHANGE UP (ref 8.5–10.1)
CHLORIDE SERPL-SCNC: 114 MMOL/L — HIGH (ref 96–108)
CO2 SERPL-SCNC: 28 MMOL/L — SIGNIFICANT CHANGE UP (ref 22–31)
CREAT SERPL-MCNC: 1.29 MG/DL — SIGNIFICANT CHANGE UP (ref 0.5–1.3)
CULTURE RESULTS: SIGNIFICANT CHANGE UP
CULTURE RESULTS: SIGNIFICANT CHANGE UP
GLUCOSE BLDC GLUCOMTR-MCNC: 120 MG/DL — HIGH (ref 70–99)
GLUCOSE BLDC GLUCOMTR-MCNC: 136 MG/DL — HIGH (ref 70–99)
GLUCOSE BLDC GLUCOMTR-MCNC: 80 MG/DL — SIGNIFICANT CHANGE UP (ref 70–99)
GLUCOSE BLDC GLUCOMTR-MCNC: 93 MG/DL — SIGNIFICANT CHANGE UP (ref 70–99)
GLUCOSE BLDC GLUCOMTR-MCNC: 96 MG/DL — SIGNIFICANT CHANGE UP (ref 70–99)
GLUCOSE SERPL-MCNC: 100 MG/DL — HIGH (ref 70–99)
HCT VFR BLD CALC: 44.7 % — SIGNIFICANT CHANGE UP (ref 39–50)
HGB BLD-MCNC: 14.4 G/DL — SIGNIFICANT CHANGE UP (ref 13–17)
MCHC RBC-ENTMCNC: 29.7 PG — SIGNIFICANT CHANGE UP (ref 27–34)
MCHC RBC-ENTMCNC: 32.2 GM/DL — SIGNIFICANT CHANGE UP (ref 32–36)
MCV RBC AUTO: 92.2 FL — SIGNIFICANT CHANGE UP (ref 80–100)
NRBC # BLD: 0 /100 WBCS — SIGNIFICANT CHANGE UP (ref 0–0)
PLATELET # BLD AUTO: 129 K/UL — LOW (ref 150–400)
POTASSIUM SERPL-MCNC: 4 MMOL/L — SIGNIFICANT CHANGE UP (ref 3.5–5.3)
POTASSIUM SERPL-SCNC: 4 MMOL/L — SIGNIFICANT CHANGE UP (ref 3.5–5.3)
PROT SERPL-MCNC: 6.8 GM/DL — SIGNIFICANT CHANGE UP (ref 6–8.3)
RBC # BLD: 4.85 M/UL — SIGNIFICANT CHANGE UP (ref 4.2–5.8)
RBC # FLD: 15 % — HIGH (ref 10.3–14.5)
SODIUM SERPL-SCNC: 147 MMOL/L — HIGH (ref 135–145)
SPECIMEN SOURCE: SIGNIFICANT CHANGE UP
SPECIMEN SOURCE: SIGNIFICANT CHANGE UP
WBC # BLD: 3.69 K/UL — LOW (ref 3.8–10.5)
WBC # FLD AUTO: 3.69 K/UL — LOW (ref 3.8–10.5)

## 2021-12-01 PROCEDURE — 99232 SBSQ HOSP IP/OBS MODERATE 35: CPT

## 2021-12-01 RX ADMIN — ATORVASTATIN CALCIUM 20 MILLIGRAM(S): 80 TABLET, FILM COATED ORAL at 21:28

## 2021-12-01 RX ADMIN — ENOXAPARIN SODIUM 40 MILLIGRAM(S): 100 INJECTION SUBCUTANEOUS at 12:01

## 2021-12-01 RX ADMIN — CLOPIDOGREL BISULFATE 75 MILLIGRAM(S): 75 TABLET, FILM COATED ORAL at 12:02

## 2021-12-01 NOTE — PROGRESS NOTE ADULT - ASSESSMENT
85 y o M with PMH of Dementia, CAD, DLD presented with generalized weakness, fever, myalgias.    COVID-19 PNA  - Not on treatment as pt on room air now   -Tylenol PRN fever  -isolation   -monitor closely    Acute cystitis.   -culture growing E.coli.  - s/p rocephin    Back pain   MR of thoracic/lumbar spine 1. Extensive degenerative changes with underlying thoracolumbar scoliosis. Multiple subluxations along with disc disease and hypertrophic changes of the posterior elements.  2. Severe foraminal narrowing at multiple levels, with moderate to severe central and recess narrowing in the mid and lower lumbar region.  No intervention as per ortho.  Pain control.  -PT evaluation - FARHAT     BERTHA    - improved    CAD  s/p stents few years ago as per daughter  -continue plavix, statin     Hyperlipidemia.   -continue lipitor.    DVT prop: Lovenox 40 daily.

## 2021-12-01 NOTE — PROGRESS NOTE ADULT - SUBJECTIVE AND OBJECTIVE BOX
BERTHA HICKS  MRN-[id number]    Follow Up:  COVID     Interval History: pt was seen and examined earlier, no distress, remains on RA, no fevers, no new complains. Pt denies cough, however, observed coughing during the exam.     PAST MEDICAL & SURGICAL HISTORY:  Hyperlipidemia        ROS:    [ ] Unobtainable because:  [x ] All other systems negative    Constitutional: no fever, no chills  Head: no trauma  Eyes: no vision changes, no eye pain  ENT:  no sore throat, no rhinorrhea  Cardiovascular:  no chest pain, no palpitation  Respiratory:  no SOB, no cough  GI:  no abd pain, no vomiting, no diarrhea  urinary: no dysuria, no hematuria, no flank pain  musculoskeletal:  no joint pain, no joint swelling  skin:  no rash  neurology:  no headache, no seizure, no change in mental status  psych: no anxiety, no depression         Allergies  No Known Allergies        ANTIMICROBIALS:      OTHER MEDS:  acetaminophen     Tablet .. 650 milliGRAM(s) Oral every 6 hours PRN  atorvastatin 20 milliGRAM(s) Oral at bedtime  clopidogrel Tablet 75 milliGRAM(s) Oral daily  dextrose 40% Gel 15 Gram(s) Oral once  dextrose 5%. 1000 milliLiter(s) IV Continuous <Continuous>  dextrose 5%. 1000 milliLiter(s) IV Continuous <Continuous>  dextrose 50% Injectable 25 Gram(s) IV Push once  dextrose 50% Injectable 12.5 Gram(s) IV Push once  dextrose 50% Injectable 25 Gram(s) IV Push once  enoxaparin Injectable 40 milliGRAM(s) SubCutaneous daily  glucagon  Injectable 1 milliGRAM(s) IntraMuscular once  insulin lispro (ADMELOG) corrective regimen sliding scale   SubCutaneous three times a day before meals  insulin lispro (ADMELOG) corrective regimen sliding scale   SubCutaneous at bedtime      Vital Signs Last 24 Hrs  T(C): 37 (01 Dec 2021 10:42), Max: 37.6 (01 Dec 2021 04:39)  T(F): 98.6 (01 Dec 2021 10:42), Max: 99.6 (01 Dec 2021 04:39)  HR: 95 (01 Dec 2021 10:42) (77 - 98)  BP: 130/69 (01 Dec 2021 10:42) (113/69 - 133/72)  BP(mean): --  RR: 18 (01 Dec 2021 10:42) (18 - 20)  SpO2: 94% (01 Dec 2021 10:42) (92% - 98%)    Physical Exam:  Constitutional: non-toxic, no distress, on RA   HEAD/EYES: anicteric, no conjunctival injection  ENT:  supple, no thrush  Cardiovascular:   normal S1, S2, no murmur, no edema  Respiratory:  clear BS bilaterally, no wheezes, no rales  GI:  soft, non-tender, normal bowel sounds  :  no nelson, no CVA tenderness  Musculoskeletal:  no synovitis, normal ROM  Neurologic: awake and alert to person not place or year   Skin:  no rash, no erythema, no phlebitis  Heme/Onc: no lymphadenopathy   Psychiatric:  awake, alert, appropriate mood    WBC Count: 3.69 K/uL (12-01 @ 08:42)  WBC Count: 2.93 K/uL (11-28 @ 07:54)  WBC Count: 3.96 K/uL (11-27 @ 08:48)  WBC Count: 4.37 K/uL (11-25 @ 22:25)                            14.4   3.69  )-----------( 129      ( 01 Dec 2021 08:42 )             44.7       12-01    147<H>  |  114<H>  |  29<H>  ----------------------------<  100<H>  4.0   |  28  |  1.29    Ca    9.0      01 Dec 2021 08:42    TPro  6.8  /  Alb  2.6<L>  /  TBili  0.4  /  DBili  0.2  /  AST  78<H>  /  ALT  37  /  AlkPhos  91  12-01          Creatinine Trend: 1.29<--, 1.10<--, 1.22<--, 1.46<--, 1.35<--, 1.79<--      MICROBIOLOGY:  v  .Blood Blood-Peripheral  11-26-21   No Growth Final  --  --      Clean Catch Clean Catch (Midstream)  11-26-21   50,000 - 99,000 CFU/mL Escherichia coli  --  Escherichia coli          D-Dimer Assay, Quantitative: 326 (11-28)  D-Dimer Assay, Quantitative: 222 (11-25)    Procalcitonin, Serum: 0.30 (11-26-21 @ 08:04)      RADIOLOGY:

## 2021-12-01 NOTE — PROGRESS NOTE ADULT - ASSESSMENT
85M with a PMH of dementia, CAD, HLD who presents to the ED for medical evaluation.    Patient had fever and initially thought to have low oxygen level though most room air documentation seems to be above 94%   today he was taken off oxygen and saturation remains consistently above 94%  fever has resolved   UA only mildly possible with 6-10 WBC and he denies any urinary symptoms  if not requiring oxygen he does not need RDV or dexamethasone  additionally I do not believe he has a UTI and his fever is more likely related to covid   would stop antibiotics, rdv and dex     11/30: no fevers since 11/25, remains on RA, no new lab work, blood cultures remain with no growth, UC grew E. Coli, pt denies any urinary symptoms at the moment, was started on ceftriaxone day #3 by the medical staff.   12/1: Pt with COVID 19 diagnosis, remains on RA, blood cultures with no growth to date, treatment for UTI complete, no role for any more antimicrobials at the moment.     Plan  No role for remdesivir and dexamethasone, pt is on RA   3 days course of ceftriaxone is complete   follow cultures  continue to check vitals and if things change can restart rdv and dex   keep on contact and airborne precautions   trend fevers   trend wbc   quarantine x 10 days from pt's COVID 19 diagnosis     will sign off, re-consult as needed     Discussed with Dr. Torres   Discussed with Dr. Vinson

## 2021-12-01 NOTE — PROGRESS NOTE ADULT - SUBJECTIVE AND OBJECTIVE BOX
INTERVAL HPI/OVERNIGHT EVENTS: Pt seen and examined at bedside. Denies any complaints. No events overnight.    85y  Vital Signs Last 24 Hrs  T(C): 37 (01 Dec 2021 10:42), Max: 37.6 (01 Dec 2021 04:39)  T(F): 98.6 (01 Dec 2021 10:42), Max: 99.6 (01 Dec 2021 04:39)  HR: 95 (01 Dec 2021 10:42) (77 - 98)  BP: 130/69 (01 Dec 2021 10:42) (113/69 - 133/72)  BP(mean): --  RR: 18 (01 Dec 2021 10:42) (18 - 20)  SpO2: 94% (01 Dec 2021 10:42) (92% - 98%)  I&O's Summary    30 Nov 2021 07:01  -  01 Dec 2021 07:00  --------------------------------------------------------  IN: 150 mL / OUT: 300 mL / NET: -150 mL      MEDICATIONS  (STANDING):  atorvastatin 20 milliGRAM(s) Oral at bedtime  clopidogrel Tablet 75 milliGRAM(s) Oral daily  dextrose 40% Gel 15 Gram(s) Oral once  dextrose 5%. 1000 milliLiter(s) (50 mL/Hr) IV Continuous <Continuous>  dextrose 5%. 1000 milliLiter(s) (100 mL/Hr) IV Continuous <Continuous>  dextrose 50% Injectable 25 Gram(s) IV Push once  dextrose 50% Injectable 12.5 Gram(s) IV Push once  dextrose 50% Injectable 25 Gram(s) IV Push once  enoxaparin Injectable 40 milliGRAM(s) SubCutaneous daily  glucagon  Injectable 1 milliGRAM(s) IntraMuscular once  insulin lispro (ADMELOG) corrective regimen sliding scale   SubCutaneous three times a day before meals  insulin lispro (ADMELOG) corrective regimen sliding scale   SubCutaneous at bedtime    MEDICATIONS  (PRN):  acetaminophen     Tablet .. 650 milliGRAM(s) Oral every 6 hours PRN Temp greater or equal to 38C (100.4F), Moderate Pain (4 - 6)    LABS:    trop                        14.4   3.69  )-----------( 129      ( 01 Dec 2021 08:42 )             44.7     12-01    147<H>  |  114<H>  |  29<H>  ----------------------------<  100<H>  4.0   |  28  |  1.29    Ca    9.0      01 Dec 2021 08:42    TPro  6.8  /  Alb  2.6<L>  /  TBili  0.4  /  DBili  0.2  /  AST  78<H>  /  ALT  37  /  AlkPhos  91  12-01        CAPILLARY BLOOD GLUCOSE      POCT Blood Glucose.: 96 mg/dL (01 Dec 2021 12:01)  POCT Blood Glucose.: 93 mg/dL (01 Dec 2021 07:59)  POCT Blood Glucose.: 95 mg/dL (30 Nov 2021 22:06)  POCT Blood Glucose.: 119 mg/dL (30 Nov 2021 15:51)          REVIEW OF SYSTEMS:  CONSTITUTIONAL: No fever, weight loss, or fatigue  RESPIRATORY: No cough, wheezing, chills or hemoptysis; No shortness of breath  CARDIOVASCULAR: No chest pain, palpitations, dizziness, or leg swelling  GASTROINTESTINAL: No abdominal or epigastric pain. No nausea, vomiting, or hematemesis; No diarrhea or constipation. No melena or hematochezia.  GENITOURINARY: No dysuria, frequency, hematuria, or incontinence  NEUROLOGICAL: No headaches, memory loss, loss of strength, numbness, or tremors  MUSCULOSKELETAL: No joint pain or swelling; No muscle, back, or extremity pain      RADIOLOGY & ADDITIONAL TESTS:    Imaging Personally Reviewed:  [ ] YES  [ ] NO    Consultant(s) Notes Reviewed:  [x ] YES  [ ] NO    PHYSICAL EXAM:  GENERAL: NAD  NERVOUS SYSTEM:  Alert & Oriented X1, moves all extremities.  CHEST/LUNG: Clear to auscultation b/l.  HEART: Regular rate and rhythm; No murmurs, rubs, or gallops  ABDOMEN: Soft, Nontender, Nondistended; Bowel sounds present  EXTREMITIES:  2+ Peripheral Pulses, No clubbing, cyanosis, or edema    A & P:        Care Discussed with Consultants/Other Providers [ x] YES  [ ] NO

## 2021-12-02 LAB
ALBUMIN SERPL ELPH-MCNC: 2.3 G/DL — LOW (ref 3.3–5)
ALBUMIN SERPL ELPH-MCNC: 2.5 G/DL — LOW (ref 3.3–5)
ALP SERPL-CCNC: 88 U/L — SIGNIFICANT CHANGE UP (ref 40–120)
ALP SERPL-CCNC: 91 U/L — SIGNIFICANT CHANGE UP (ref 40–120)
ALT FLD-CCNC: 42 U/L — SIGNIFICANT CHANGE UP (ref 12–78)
ALT FLD-CCNC: 45 U/L — SIGNIFICANT CHANGE UP (ref 12–78)
ANISOCYTOSIS BLD QL: SLIGHT — SIGNIFICANT CHANGE UP
APPEARANCE UR: CLEAR — SIGNIFICANT CHANGE UP
AST SERPL-CCNC: 87 U/L — HIGH (ref 15–37)
AST SERPL-CCNC: 92 U/L — HIGH (ref 15–37)
BACTERIA # UR AUTO: ABNORMAL
BASOPHILS # BLD AUTO: 0 K/UL — SIGNIFICANT CHANGE UP (ref 0–0.2)
BASOPHILS NFR BLD AUTO: 0 % — SIGNIFICANT CHANGE UP (ref 0–2)
BILIRUB DIRECT SERPL-MCNC: 0.2 MG/DL — SIGNIFICANT CHANGE UP (ref 0–0.3)
BILIRUB DIRECT SERPL-MCNC: 0.2 MG/DL — SIGNIFICANT CHANGE UP (ref 0–0.3)
BILIRUB INDIRECT FLD-MCNC: 0.2 MG/DL — SIGNIFICANT CHANGE UP (ref 0.2–1)
BILIRUB INDIRECT FLD-MCNC: 0.3 MG/DL — SIGNIFICANT CHANGE UP (ref 0.2–1)
BILIRUB SERPL-MCNC: 0.4 MG/DL — SIGNIFICANT CHANGE UP (ref 0.2–1.2)
BILIRUB SERPL-MCNC: 0.5 MG/DL — SIGNIFICANT CHANGE UP (ref 0.2–1.2)
BILIRUB UR-MCNC: NEGATIVE — SIGNIFICANT CHANGE UP
COLOR SPEC: YELLOW — SIGNIFICANT CHANGE UP
COMMENT - URINE: SIGNIFICANT CHANGE UP
CREAT SERPL-MCNC: 1.47 MG/DL — HIGH (ref 0.5–1.3)
CREAT SERPL-MCNC: 1.78 MG/DL — HIGH (ref 0.5–1.3)
CRP SERPL-MCNC: 82 MG/L — HIGH
D DIMER BLD IA.RAPID-MCNC: 293 NG/ML DDU — HIGH
DIFF PNL FLD: ABNORMAL
EOSINOPHIL # BLD AUTO: 0 K/UL — SIGNIFICANT CHANGE UP (ref 0–0.5)
EOSINOPHIL NFR BLD AUTO: 0 % — SIGNIFICANT CHANGE UP (ref 0–6)
EPI CELLS # UR: SIGNIFICANT CHANGE UP
ERYTHROCYTE [SEDIMENTATION RATE] IN BLOOD: 54 MM/HR — HIGH (ref 0–20)
GLUCOSE BLDC GLUCOMTR-MCNC: 137 MG/DL — HIGH (ref 70–99)
GLUCOSE BLDC GLUCOMTR-MCNC: 138 MG/DL — HIGH (ref 70–99)
GLUCOSE BLDC GLUCOMTR-MCNC: 148 MG/DL — HIGH (ref 70–99)
GLUCOSE BLDC GLUCOMTR-MCNC: 162 MG/DL — HIGH (ref 70–99)
GLUCOSE UR QL: NEGATIVE MG/DL — SIGNIFICANT CHANGE UP
GRAN CASTS # UR COMP ASSIST: ABNORMAL /LPF
HCT VFR BLD CALC: 46 % — SIGNIFICANT CHANGE UP (ref 39–50)
HGB BLD-MCNC: 14.5 G/DL — SIGNIFICANT CHANGE UP (ref 13–17)
KETONES UR-MCNC: NEGATIVE — SIGNIFICANT CHANGE UP
LACTATE SERPL-SCNC: 1.5 MMOL/L — SIGNIFICANT CHANGE UP (ref 0.7–2)
LDH SERPL L TO P-CCNC: 552 U/L — HIGH (ref 50–242)
LEUKOCYTE ESTERASE UR-ACNC: NEGATIVE — SIGNIFICANT CHANGE UP
LYMPHOCYTES # BLD AUTO: 1.11 K/UL — SIGNIFICANT CHANGE UP (ref 1–3.3)
LYMPHOCYTES # BLD AUTO: 24 % — SIGNIFICANT CHANGE UP (ref 13–44)
MACROCYTES BLD QL: SIGNIFICANT CHANGE UP
MANUAL SMEAR VERIFICATION: SIGNIFICANT CHANGE UP
MCHC RBC-ENTMCNC: 29.8 PG — SIGNIFICANT CHANGE UP (ref 27–34)
MCHC RBC-ENTMCNC: 31.5 GM/DL — LOW (ref 32–36)
MCV RBC AUTO: 94.7 FL — SIGNIFICANT CHANGE UP (ref 80–100)
MONOCYTES # BLD AUTO: 0.14 K/UL — SIGNIFICANT CHANGE UP (ref 0–0.9)
MONOCYTES NFR BLD AUTO: 3 % — SIGNIFICANT CHANGE UP (ref 2–14)
MRSA PCR RESULT.: SIGNIFICANT CHANGE UP
NEUTROPHILS # BLD AUTO: 2.83 K/UL — SIGNIFICANT CHANGE UP (ref 1.8–7.4)
NEUTROPHILS NFR BLD AUTO: 61 % — SIGNIFICANT CHANGE UP (ref 43–77)
NITRITE UR-MCNC: NEGATIVE — SIGNIFICANT CHANGE UP
NRBC # BLD: 0 /100 — SIGNIFICANT CHANGE UP (ref 0–0)
NRBC # BLD: SIGNIFICANT CHANGE UP /100 WBCS (ref 0–0)
PH UR: 5 — SIGNIFICANT CHANGE UP (ref 5–8)
PLAT MORPH BLD: NORMAL — SIGNIFICANT CHANGE UP
PLATELET # BLD AUTO: 158 K/UL — SIGNIFICANT CHANGE UP (ref 150–400)
PROCALCITONIN SERPL-MCNC: 0.24 NG/ML — HIGH (ref 0.02–0.1)
PROT SERPL-MCNC: 6.7 GM/DL — SIGNIFICANT CHANGE UP (ref 6–8.3)
PROT SERPL-MCNC: 6.9 GM/DL — SIGNIFICANT CHANGE UP (ref 6–8.3)
PROT UR-MCNC: 500 MG/DL
RBC # BLD: 4.86 M/UL — SIGNIFICANT CHANGE UP (ref 4.2–5.8)
RBC # FLD: 15.1 % — HIGH (ref 10.3–14.5)
RBC BLD AUTO: ABNORMAL
RBC CASTS # UR COMP ASSIST: ABNORMAL /HPF (ref 0–4)
S AUREUS DNA NOSE QL NAA+PROBE: SIGNIFICANT CHANGE UP
SP GR SPEC: 1.02 — SIGNIFICANT CHANGE UP (ref 1.01–1.02)
UROBILINOGEN FLD QL: NEGATIVE MG/DL — SIGNIFICANT CHANGE UP
VARIANT LYMPHS # BLD: 12 % — HIGH (ref 0–6)
WBC # BLD: 4.64 K/UL — SIGNIFICANT CHANGE UP (ref 3.8–10.5)
WBC # FLD AUTO: 4.64 K/UL — SIGNIFICANT CHANGE UP (ref 3.8–10.5)

## 2021-12-02 PROCEDURE — 99233 SBSQ HOSP IP/OBS HIGH 50: CPT

## 2021-12-02 PROCEDURE — 71045 X-RAY EXAM CHEST 1 VIEW: CPT | Mod: 26

## 2021-12-02 PROCEDURE — 99232 SBSQ HOSP IP/OBS MODERATE 35: CPT

## 2021-12-02 RX ORDER — DEXAMETHASONE 0.5 MG/5ML
6 ELIXIR ORAL DAILY
Refills: 0 | Status: DISCONTINUED | OUTPATIENT
Start: 2021-12-02 | End: 2021-12-09

## 2021-12-02 RX ORDER — SODIUM CHLORIDE 9 MG/ML
1000 INJECTION INTRAMUSCULAR; INTRAVENOUS; SUBCUTANEOUS
Refills: 0 | Status: DISCONTINUED | OUTPATIENT
Start: 2021-12-02 | End: 2021-12-03

## 2021-12-02 RX ORDER — REMDESIVIR 5 MG/ML
100 INJECTION INTRAVENOUS EVERY 24 HOURS
Refills: 0 | Status: DISCONTINUED | OUTPATIENT
Start: 2021-12-03 | End: 2021-12-06

## 2021-12-02 RX ORDER — DEXAMETHASONE 0.5 MG/5ML
6 ELIXIR ORAL DAILY
Refills: 0 | Status: DISCONTINUED | OUTPATIENT
Start: 2021-12-02 | End: 2021-12-02

## 2021-12-02 RX ORDER — REMDESIVIR 5 MG/ML
INJECTION INTRAVENOUS
Refills: 0 | Status: DISCONTINUED | OUTPATIENT
Start: 2021-12-02 | End: 2021-12-06

## 2021-12-02 RX ORDER — REMDESIVIR 5 MG/ML
200 INJECTION INTRAVENOUS EVERY 24 HOURS
Refills: 0 | Status: COMPLETED | OUTPATIENT
Start: 2021-12-02 | End: 2021-12-02

## 2021-12-02 RX ADMIN — ENOXAPARIN SODIUM 40 MILLIGRAM(S): 100 INJECTION SUBCUTANEOUS at 11:53

## 2021-12-02 RX ADMIN — Medication 650 MILLIGRAM(S): at 07:44

## 2021-12-02 RX ADMIN — SODIUM CHLORIDE 75 MILLILITER(S): 9 INJECTION INTRAMUSCULAR; INTRAVENOUS; SUBCUTANEOUS at 14:55

## 2021-12-02 RX ADMIN — CLOPIDOGREL BISULFATE 75 MILLIGRAM(S): 75 TABLET, FILM COATED ORAL at 11:53

## 2021-12-02 RX ADMIN — REMDESIVIR 500 MILLIGRAM(S): 5 INJECTION INTRAVENOUS at 14:55

## 2021-12-02 RX ADMIN — Medication 650 MILLIGRAM(S): at 06:33

## 2021-12-02 RX ADMIN — Medication 0: at 22:12

## 2021-12-02 RX ADMIN — Medication 6 MILLIGRAM(S): at 14:55

## 2021-12-02 RX ADMIN — ATORVASTATIN CALCIUM 20 MILLIGRAM(S): 80 TABLET, FILM COATED ORAL at 22:12

## 2021-12-02 NOTE — PROGRESS NOTE ADULT - ASSESSMENT
85M with a PMH of dementia, CAD, HLD who presents to the ED for medical evaluation.    Patient had fever and initially thought to have low oxygen level though most room air documentation seems to be above 94%   today he was taken off oxygen and saturation remains consistently above 94%  fever has resolved   UA only mildly possible with 6-10 WBC and he denies any urinary symptoms  if not requiring oxygen he does not need RDV or dexamethasone  additionally I do not believe he has a UTI and his fever is more likely related to covid   would stop antibiotics, rdv and dex     11/30: no fevers since 11/25, remains on RA, no new lab work, blood cultures remain with no growth, UC grew E. Coli, pt denies any urinary symptoms at the moment, was started on ceftriaxone day #3 by the medical staff.   12/1: Pt with COVID 19 diagnosis, remains on RA, blood cultures with no growth to date, treatment for UTI complete, no role for any more antimicrobials at the moment.   12/2: having fevers today, now requires supplemental O2, started on remdesivir and decadron. Procalcitonin is low, CXR with worsening, personally reviewed, will obtain two sets of BC. Pt's symptoms most likely related to COVID 19. Discussed with pt's son, all questions answered to the best of my ability.     Plan  start remdesivir x 5 days  start dexamethasone x 10 days   3 days course of ceftriaxone is complete   follow culture data  two more sets of BC pending result  trend fevers   trend wbc  wean off supplemental O2 as able  DVT prophylaxis as per protocol  trend inflammatory markers  follow up on official cxr reading     discussed with Dr. Torres  discussed with Dr. Vinson

## 2021-12-02 NOTE — PROGRESS NOTE ADULT - SUBJECTIVE AND OBJECTIVE BOX
BERTHA HICKS  MRN-[id number]    Follow Up:  COVID 19    Interval History: The pt was seen and examined earlier today, in bed, fatigued, pt's son present. The pt spiked fevers today, 101.3, became hypoxic and was placed on supplemental O2 via NC. The pt was able to state his name and birthday, thinks that he is in rehab, unable to state the year.     PAST MEDICAL & SURGICAL HISTORY:  Hyperlipidemia        ROS:    [ ] Unobtainable because:  [x ] All other systems negative    Constitutional: + fever, no chills  Head: no trauma  Eyes: no vision changes, no eye pain  ENT:  no sore throat, no rhinorrhea  Cardiovascular:  no chest pain, no palpitation  Respiratory:  no SOB, no cough  GI:  no abd pain, no vomiting, no diarrhea  urinary: no dysuria, no hematuria, no flank pain  musculoskeletal:  no joint pain, no joint swelling  skin:  no rash  neurology:  no headache, no seizure, no change in mental status  psych: no anxiety, no depression         Allergies  No Known Allergies        ANTIMICROBIALS:  remdesivir  IVPB        OTHER MEDS:  acetaminophen     Tablet .. 650 milliGRAM(s) Oral every 6 hours PRN  atorvastatin 20 milliGRAM(s) Oral at bedtime  clopidogrel Tablet 75 milliGRAM(s) Oral daily  dexAMETHasone  Injectable 6 milliGRAM(s) IV Push daily  dextrose 40% Gel 15 Gram(s) Oral once  dextrose 5%. 1000 milliLiter(s) IV Continuous <Continuous>  dextrose 5%. 1000 milliLiter(s) IV Continuous <Continuous>  dextrose 50% Injectable 25 Gram(s) IV Push once  dextrose 50% Injectable 12.5 Gram(s) IV Push once  dextrose 50% Injectable 25 Gram(s) IV Push once  enoxaparin Injectable 40 milliGRAM(s) SubCutaneous daily  glucagon  Injectable 1 milliGRAM(s) IntraMuscular once  insulin lispro (ADMELOG) corrective regimen sliding scale   SubCutaneous three times a day before meals  insulin lispro (ADMELOG) corrective regimen sliding scale   SubCutaneous at bedtime  sodium chloride 0.9%. 1000 milliLiter(s) IV Continuous <Continuous>      Vital Signs Last 24 Hrs  T(C): 37.2 (02 Dec 2021 10:37), Max: 38.5 (02 Dec 2021 06:24)  T(F): 99 (02 Dec 2021 10:37), Max: 101.3 (02 Dec 2021 06:24)  HR: 95 (02 Dec 2021 10:37) (95 - 101)  BP: 100/63 (02 Dec 2021 10:37) (100/63 - 124/65)  BP(mean): --  RR: 18 (02 Dec 2021 10:37) (18 - 20)  SpO2: 100% (02 Dec 2021 10:37) (86% - 100%)    Physical Exam:  Constitutional: non-toxic, no distress, on NC, fatigued   HEAD/EYES: anicteric, no conjunctival injection  ENT:  supple, no thrush  Cardiovascular:   normal S1, S2, no murmur, no edema  Respiratory:  mild crackles bilaterally, no wheezes, no rales  GI:  soft, non-tender, normal bowel sounds  :  no nelson, no CVA tenderness  Musculoskeletal:  no synovitis, normal ROM  Neurologic: awake and alert to person and birthday, not place or year   Skin:  no rash, no erythema, no phlebitis  Heme/Onc: no lymphadenopathy   Psychiatric:  awake, alert, appropriate mood    WBC Count: 4.64 K/uL ( @ 11:27)  WBC Count: 3.69 K/uL ( @ 08:42)  WBC Count: 2.93 K/uL ( @ 07:54)  WBC Count: 3.96 K/uL ( @ 08:48)  WBC Count: 4.37 K/uL ( @ 22:25)                            14.5   4.64  )-----------( 158      ( 02 Dec 2021 11:27 )             46.0           x   |  x   |  x   ----------------------------<  x   x    |  x   |  1.78<H>    Ca    9.0      01 Dec 2021 08:42    TPro  6.7  /  Alb  2.3<L>  /  TBili  0.4  /  DBili  0.2  /  AST  87<H>  /  ALT  42  /  AlkPhos  88        Urinalysis Basic - ( 02 Dec 2021 12:16 )    Color: Yellow / Appearance: Clear / S.020 / pH: x  Gluc: x / Ketone: Negative  / Bili: Negative / Urobili: Negative mg/dL   Blood: x / Protein: 500 mg/dL / Nitrite: Negative   Leuk Esterase: Negative / RBC: 11-25 /HPF / WBC x   Sq Epi: x / Non Sq Epi: Occasional / Bacteria: Few        Creatinine Trend: 1.78<--, 1.47<--, 1.29<--, 1.10<--, 1.22<--, 1.46<--  Lactate, Blood: 1.5 mmol/L (21 @ 11:27)      MICROBIOLOGY:  v  .Blood Blood-Peripheral  21   No Growth Final  --  --      Clean Catch Clean Catch (Midstream)  21   50,000 - 99,000 CFU/mL Escherichia coli  --  Escherichia coli        D-Dimer Assay, Quantitative: 293 ()  D-Dimer Assay, Quantitative: 326 ()  D-Dimer Assay, Quantitative: 222 ()    Procalcitonin, Serum: 0.24 (21 @ 15:19)  Procalcitonin, Serum: 0.30 (21 @ 08:04)      RADIOLOGY:

## 2021-12-02 NOTE — PROGRESS NOTE ADULT - SUBJECTIVE AND OBJECTIVE BOX
INTERVAL HPI/OVERNIGHT EVENTS: Pt seen and examined at bedside. Denies any complaints. No events overnight.    85y  Vital Signs Last 24 Hrs  T(C): 37.2 (02 Dec 2021 10:37), Max: 38.5 (02 Dec 2021 06:24)  T(F): 99 (02 Dec 2021 10:37), Max: 101.3 (02 Dec 2021 06:24)  HR: 95 (02 Dec 2021 10:37) (95 - 101)  BP: 100/63 (02 Dec 2021 10:37) (100/63 - 124/65)  BP(mean): --  RR: 18 (02 Dec 2021 10:37) (18 - 20)  SpO2: 100% (02 Dec 2021 10:37) (86% - 100%)  I&O's Summary    01 Dec 2021 07:01  -  02 Dec 2021 07:00  --------------------------------------------------------  IN: 0 mL / OUT: 400 mL / NET: -400 mL      MEDICATIONS  (STANDING):  atorvastatin 20 milliGRAM(s) Oral at bedtime  clopidogrel Tablet 75 milliGRAM(s) Oral daily  dexAMETHasone  IVPB 6 milliGRAM(s) IV Intermittent daily  dextrose 40% Gel 15 Gram(s) Oral once  dextrose 5%. 1000 milliLiter(s) (50 mL/Hr) IV Continuous <Continuous>  dextrose 5%. 1000 milliLiter(s) (100 mL/Hr) IV Continuous <Continuous>  dextrose 50% Injectable 25 Gram(s) IV Push once  dextrose 50% Injectable 12.5 Gram(s) IV Push once  dextrose 50% Injectable 25 Gram(s) IV Push once  enoxaparin Injectable 40 milliGRAM(s) SubCutaneous daily  glucagon  Injectable 1 milliGRAM(s) IntraMuscular once  insulin lispro (ADMELOG) corrective regimen sliding scale   SubCutaneous three times a day before meals  insulin lispro (ADMELOG) corrective regimen sliding scale   SubCutaneous at bedtime  remdesivir  IVPB   IV Intermittent   sodium chloride 0.9%. 1000 milliLiter(s) (75 mL/Hr) IV Continuous <Continuous>    MEDICATIONS  (PRN):  acetaminophen     Tablet .. 650 milliGRAM(s) Oral every 6 hours PRN Temp greater or equal to 38C (100.4F), Moderate Pain (4 - 6)    LABS:    trop                        14.5   4.64  )-----------( 158      ( 02 Dec 2021 11:27 )             46.0     12-02    x   |  x   |  x   ----------------------------<  x   x    |  x   |  1.47<H>    Ca    9.0      01 Dec 2021 08:42    TPro  6.9  /  Alb  2.5<L>  /  TBili  0.5  /  DBili  0.2  /  AST  92<H>  /  ALT  45  /  AlkPhos  91  12-02        CAPILLARY BLOOD GLUCOSE      POCT Blood Glucose.: 137 mg/dL (02 Dec 2021 11:45)  POCT Blood Glucose.: 138 mg/dL (02 Dec 2021 07:56)  POCT Blood Glucose.: 136 mg/dL (01 Dec 2021 22:21)  POCT Blood Glucose.: 80 mg/dL (01 Dec 2021 21:31)  POCT Blood Glucose.: 120 mg/dL (01 Dec 2021 16:27)          REVIEW OF SYSTEMS:  CONSTITUTIONAL: No fever, weight loss, or fatigue  RESPIRATORY: No cough, wheezing, chills or hemoptysis; No shortness of breath  CARDIOVASCULAR: No chest pain, palpitations, dizziness, or leg swelling  GASTROINTESTINAL: No abdominal or epigastric pain. No nausea, vomiting, or hematemesis; No diarrhea or constipation. No melena or hematochezia.  GENITOURINARY: No dysuria, frequency, hematuria, or incontinence  NEUROLOGICAL: No headaches, memory loss, loss of strength, numbness, or tremors  MUSCULOSKELETAL: No joint pain or swelling; No muscle, back, or extremity pain      RADIOLOGY & ADDITIONAL TESTS:    Imaging Personally Reviewed:  [ ] YES  [ ] NO    Consultant(s) Notes Reviewed:  [x ] YES  [ ] NO    PHYSICAL EXAM:  GENERAL: NAD  NERVOUS SYSTEM:  Alert & Oriented X2, moves all extremities.  CHEST/LUNG: Clear to auscultation b/l.  HEART: Regular rate and rhythm; No murmurs, rubs, or gallops  ABDOMEN: Soft, Nontender, Nondistended; Bowel sounds present  EXTREMITIES:  2+ Peripheral Pulses, No clubbing, cyanosis, or edema      A & P:        Care Discussed with Consultants/Other Providers [ x] YES  [ ] NO

## 2021-12-02 NOTE — PROGRESS NOTE ADULT - ASSESSMENT
85 y o M with PMH of Dementia, CAD, DLD presented with generalized weakness, fever, myalgias.    COVID-19 PNA  - c/w supplemental O2  - started on Remdesivir and decadron  - T 101.3F f/u cx  -Tylenol PRN fever  -isolation   - ID on board.    Acute cystitis.   -culture growing E.coli.  - s/p rocephin    Back pain   MR of thoracic/lumbar spine 1. Extensive degenerative changes with underlying thoracolumbar scoliosis. Multiple subluxations along with disc disease and hypertrophic changes of the posterior elements.  2. Severe foraminal narrowing at multiple levels, with moderate to severe central and recess narrowing in the mid and lower lumbar region.  No intervention as per ortho.  Pain control.  -PT evaluation - FARHAT     BERTHA    - c/w IVF  - monitor bmp.    CAD  s/p stents few years ago as per daughter  -continue plavix, statin     Hyperlipidemia.   -continue lipitor.    DVT prop: Lovenox 40 daily. 85 y o M with PMH of Dementia, CAD, DLD presented with generalized weakness, fever, myalgias.    COVID-19 PNA  - c/w supplemental O2  - started on Remdesivir and decadron  - T 101.3F f/u cx  -Tylenol PRN fever  -isolation   - ID on board.    Acute cystitis.   -culture growing E.coli.  - s/p rocephin    Back pain   MR of thoracic/lumbar spine 1. Extensive degenerative changes with underlying thoracolumbar scoliosis. Multiple subluxations along with disc disease and hypertrophic changes of the posterior elements.  2. Severe foraminal narrowing at multiple levels, with moderate to severe central and recess narrowing in the mid and lower lumbar region.  No intervention as per ortho.  Pain control.  -PT evaluation - FARHAT     BERTHA    - c/w IVF  - monitor bmp.    CAD  s/p stents few years ago as per daughter  -continue plavix, statin     Hyperlipidemia.   -continue lipitor.    DVT prop: Lovenox 40 daily.  Spoke with daughter Enid ( [phone] ) and updated the plan of care. 11/29, 30, 12/1,2

## 2021-12-03 LAB
ALBUMIN SERPL ELPH-MCNC: 2.4 G/DL — LOW (ref 3.3–5)
ALP SERPL-CCNC: 86 U/L — SIGNIFICANT CHANGE UP (ref 40–120)
ALT FLD-CCNC: 40 U/L — SIGNIFICANT CHANGE UP (ref 12–78)
ANION GAP SERPL CALC-SCNC: 7 MMOL/L — SIGNIFICANT CHANGE UP (ref 5–17)
ANION GAP SERPL CALC-SCNC: 8 MMOL/L — SIGNIFICANT CHANGE UP (ref 5–17)
AST SERPL-CCNC: 82 U/L — HIGH (ref 15–37)
BILIRUB DIRECT SERPL-MCNC: 0.2 MG/DL — SIGNIFICANT CHANGE UP (ref 0–0.3)
BILIRUB INDIRECT FLD-MCNC: 0.2 MG/DL — SIGNIFICANT CHANGE UP (ref 0.2–1)
BILIRUB SERPL-MCNC: 0.4 MG/DL — SIGNIFICANT CHANGE UP (ref 0.2–1.2)
BUN SERPL-MCNC: 49 MG/DL — HIGH (ref 7–23)
BUN SERPL-MCNC: 56 MG/DL — HIGH (ref 7–23)
CALCIUM SERPL-MCNC: 9.2 MG/DL — SIGNIFICANT CHANGE UP (ref 8.5–10.1)
CALCIUM SERPL-MCNC: 9.6 MG/DL — SIGNIFICANT CHANGE UP (ref 8.5–10.1)
CHLORIDE SERPL-SCNC: 116 MMOL/L — HIGH (ref 96–108)
CHLORIDE SERPL-SCNC: 122 MMOL/L — HIGH (ref 96–108)
CO2 SERPL-SCNC: 23 MMOL/L — SIGNIFICANT CHANGE UP (ref 22–31)
CO2 SERPL-SCNC: 24 MMOL/L — SIGNIFICANT CHANGE UP (ref 22–31)
CREAT SERPL-MCNC: 1.41 MG/DL — HIGH (ref 0.5–1.3)
CREAT SERPL-MCNC: 1.42 MG/DL — HIGH (ref 0.5–1.3)
CREAT SERPL-MCNC: 1.45 MG/DL — HIGH (ref 0.5–1.3)
GLUCOSE BLDC GLUCOMTR-MCNC: 111 MG/DL — HIGH (ref 70–99)
GLUCOSE BLDC GLUCOMTR-MCNC: 157 MG/DL — HIGH (ref 70–99)
GLUCOSE BLDC GLUCOMTR-MCNC: 160 MG/DL — HIGH (ref 70–99)
GLUCOSE BLDC GLUCOMTR-MCNC: 186 MG/DL — HIGH (ref 70–99)
GLUCOSE SERPL-MCNC: 135 MG/DL — HIGH (ref 70–99)
GLUCOSE SERPL-MCNC: 197 MG/DL — HIGH (ref 70–99)
POTASSIUM SERPL-MCNC: 4.2 MMOL/L — SIGNIFICANT CHANGE UP (ref 3.5–5.3)
POTASSIUM SERPL-MCNC: 4.4 MMOL/L — SIGNIFICANT CHANGE UP (ref 3.5–5.3)
POTASSIUM SERPL-SCNC: 4.2 MMOL/L — SIGNIFICANT CHANGE UP (ref 3.5–5.3)
POTASSIUM SERPL-SCNC: 4.4 MMOL/L — SIGNIFICANT CHANGE UP (ref 3.5–5.3)
PROT SERPL-MCNC: 6.7 GM/DL — SIGNIFICANT CHANGE UP (ref 6–8.3)
SODIUM SERPL-SCNC: 148 MMOL/L — HIGH (ref 135–145)
SODIUM SERPL-SCNC: 152 MMOL/L — HIGH (ref 135–145)

## 2021-12-03 PROCEDURE — 99232 SBSQ HOSP IP/OBS MODERATE 35: CPT

## 2021-12-03 RX ORDER — SODIUM CHLORIDE 9 MG/ML
1000 INJECTION, SOLUTION INTRAVENOUS
Refills: 0 | Status: DISCONTINUED | OUTPATIENT
Start: 2021-12-03 | End: 2021-12-04

## 2021-12-03 RX ADMIN — SODIUM CHLORIDE 100 MILLILITER(S): 9 INJECTION, SOLUTION INTRAVENOUS at 09:39

## 2021-12-03 RX ADMIN — CLOPIDOGREL BISULFATE 75 MILLIGRAM(S): 75 TABLET, FILM COATED ORAL at 12:29

## 2021-12-03 RX ADMIN — REMDESIVIR 500 MILLIGRAM(S): 5 INJECTION INTRAVENOUS at 14:29

## 2021-12-03 RX ADMIN — Medication 1: at 17:14

## 2021-12-03 RX ADMIN — ATORVASTATIN CALCIUM 20 MILLIGRAM(S): 80 TABLET, FILM COATED ORAL at 22:22

## 2021-12-03 RX ADMIN — ENOXAPARIN SODIUM 40 MILLIGRAM(S): 100 INJECTION SUBCUTANEOUS at 12:29

## 2021-12-03 RX ADMIN — Medication 6 MILLIGRAM(S): at 05:49

## 2021-12-03 RX ADMIN — Medication 1: at 12:28

## 2021-12-03 RX ADMIN — SODIUM CHLORIDE 75 MILLILITER(S): 9 INJECTION INTRAMUSCULAR; INTRAVENOUS; SUBCUTANEOUS at 05:49

## 2021-12-03 NOTE — DIETITIAN INITIAL EVALUATION ADULT. - REASON
In Accordance with current standards limiting physical contact-unable to perform physical Assessment at this time.

## 2021-12-03 NOTE — PROGRESS NOTE ADULT - SUBJECTIVE AND OBJECTIVE BOX
BERTHA HICKS  MRN-[id number]    Follow Up:  COVID 19    Interval History: The pt was seen and examined earlier today, in bed, fatigued, on nasal cannula has no complaints   PAST MEDICAL & SURGICAL HISTORY:  Hyperlipidemia        ROS:    [ ] Unobtainable because:  [x ] All other systems negative    Constitutional: + fever, no chills  Head: no trauma  Eyes: no vision changes, no eye pain  ENT:  no sore throat, no rhinorrhea  Cardiovascular:  no chest pain, no palpitation  Respiratory:  no SOB, no cough  GI:  no abd pain, no vomiting, no diarrhea  urinary: no dysuria, no hematuria, no flank pain  musculoskeletal:  no joint pain, no joint swelling  skin:  no rash  neurology:  no headache, no seizure, no change in mental status  psych: no anxiety, no depression         Allergies  No Known Allergies        ANTIMICROBIALS:    remdesivir  IVPB    remdesivir  IVPB 100 every 24 hours      MEDICATIONS  (STANDING):  atorvastatin 20 at bedtime  clopidogrel Tablet 75 daily  dexAMETHasone  Injectable 6 daily  dextrose 40% Gel 15 once  dextrose 50% Injectable 25 once  dextrose 50% Injectable 12.5 once  dextrose 50% Injectable 25 once  enoxaparin Injectable 40 daily  glucagon  Injectable 1 once  insulin lispro (ADMELOG) corrective regimen sliding scale  three times a day before meals  insulin lispro (ADMELOG) corrective regimen sliding scale  at bedtime      PRN  acetaminophen     Tablet .. 650 milliGRAM(s) Oral every 6 hours PRN      Physical Exam:  Vital Signs Last 24 Hrs  T(F): 97.3 (21 @ 11:38), Max: 99.3 (21 @ 16:41)  HR: 82 (21 @ 11:38)  BP: 121/79 (21 @ 11:38)  RR: 19 (21 @ 11:38)  SpO2: 94% (21 @ 11:38) (92% - 100%)  Wt(kg): --    Physical Exam:  Constitutional: non-toxic, no distress, on NC, fatigued   HEAD/EYES: anicteric, no conjunctival injection  ENT:  supple, no thrush  Cardiovascular:   normal S1, S2, no murmur, no edema  Respiratory:  mild crackles bilaterally, no wheezes, no rales  GI:  soft, non-tender, normal bowel sounds  :  no nelson, no CVA tenderness  Musculoskeletal:  no synovitis, normal ROM  Neurologic: awake and alert to person and birthday, not place or year   Skin:  no rash, no erythema, no phlebitis  Heme/Onc: no lymphadenopathy   Psychiatric:  awake, alert, appropriate mood    WBC Count: 4.64 K/uL ( @ 11:27)  WBC Count: 3.69 K/uL ( @ 08:42)  WBC Count: 2.93 K/uL ( @ 07:54)  WBC Count: 3.96 K/uL ( @ 08:48)                            14.5   4.64  )-----------( 158      ( 02 Dec 2021 11:27 )             46.0           152<H>  |  122<H>  |  56<H>  ----------------------------<  135<H>  4.4   |  23  |  1.42<H>    Ca    9.2      03 Dec 2021 07:05    TPro  6.7  /  Alb  2.4<L>  /  TBili  0.4  /  DBili  0.2  /  AST  82<H>  /  ALT  40  /  AlkPhos  86        Creatinine Trend: 1.42<--, 1.78<--, 1.47<--, 1.29<--, 1.10<--, 1.22<--    Lactate, Blood: 1.5 mmol/L (21 @ 11:27)      Urinalysis Basic - ( 02 Dec 2021 12:16 )    Color: Yellow / Appearance: Clear / S.020 / pH: x  Gluc: x / Ketone: Negative  / Bili: Negative / Urobili: Negative mg/dL   Blood: x / Protein: 500 mg/dL / Nitrite: Negative   Leuk Esterase: Negative / RBC: 11-25 /HPF / WBC x   Sq Epi: x / Non Sq Epi: Occasional / Bacteria: Few        MICROBIOLOGY:  v  .Blood Blood-Peripheral  21   No Growth Final  --  --      Clean Catch Clean Catch (Midstream)  21   50,000 - 99,000 CFU/mL Escherichia coli  --  Escherichia coli        D-Dimer Assay, Quantitative: 293 ()  D-Dimer Assay, Quantitative: 326 (11-28)  D-Dimer Assay, Quantitative: 222 (-25)    Procalcitonin, Serum: 0.24 (21 @ 15:19)  Procalcitonin, Serum: 0.30 (21 @ 08:04)      RADIOLOGY:

## 2021-12-03 NOTE — DIETITIAN INITIAL EVALUATION ADULT. - OTHER INFO
Pt w/ Dementia ; Covid 19 PNA ; Acute cystitis ; Back pain ; BERTHA ( on IV fluids ) ; CAD ; s/p stents few years ago as per daughter ; HLD. PTA pt was living alone. His son was doing the food shopping / cooking. Spoke to pt's daughter via cell phone - [phone] ( [place]). Pt without hx wt loss. Denies N/V/D/C/Swallowing issues, No food allergies. Family has been bringing food from home as pt refuses to eat cold / Luke warm food, he only will eat hot foods. She also said she thinks her dad would be better off with soft foods.  Explained to  pt's daughter he is on isolation and therefore In Accordance with current standards limiting physical contact-unable to perform physical Assessment at this time.  Daughter provided food preferences. Pt dislikes eggs in any form for breakfast. Pt w/ Dementia ; Covid 19 PNA ; Acute cystitis ; Back pain ; BERTHA ( on IV fluids ) ; CAD ; s/p stents few years ago as per daughter ; HLD. PTA pt was living alone. His son was doing the food shopping / cooking. Spoke to pt's daughter via cell phone - [phone] ( [place]). Pt without hx wt loss. Denies N/V/D/C/Swallowing issues, No food allergies. Family has been bringing food from home as pt refuses to eat cold / Luke warm food, he only will eat hot foods. She also said she thinks her dad would be better off with soft foods.  Explained to  pt's daughter he is on isolation and therefore In Accordance with current standards limiting physical contact-unable to perform physical Assessment at this time.  Daughter provided food preferences. Pt dislikes eggs in any form for breakfast.  Pt w/ poor p.o. intake during Length Of Stay.

## 2021-12-03 NOTE — CONSULT NOTE ADULT - SUBJECTIVE AND OBJECTIVE BOX
BERTHA HICKS  MRN-[id number]        Patient is a 85y old  Male who presents with a chief complaint of COVID, UTI (2021 10:58)      HPI:  Patient is an 85M with a PMH of dementia, CAD, HLD who presents to the ED for medical evaluation.  Patient currently AAOx2, can provide history however has no complaints.  Unsure why he is in the hospital.  Family could not be reached over the phone.  Per ED staff, patient has stopped taking his medications at home.  Also having generalized weakness, fever, and myalgia.  Febrile in ED to 101.9, labs show increased creatinine.  SpO2 as low as 88% on room air - improved to 95% on 2L via NC.  Will admit to med surg.     (2021 02:07)    denies any urinary symptoms   ID consulted for workup and antibiotic management     PAST MEDICAL & SURGICAL HISTORY:  Hyperlipidemia        Allergies  No Known Allergies        ANTIMICROBIALS:      MEDICATIONS  (STANDING):  cefepime   IVPB   100 mL/Hr IV Intermittent (21 @ 22:55)    cefTRIAXone   IVPB   100 mL/Hr IV Intermittent (21 @ 05:11)    remdesivir  IVPB   200 milliGRAM(s) IV Intermittent (21 @ 06:53)    vancomycin  IVPB.   250 mL/Hr IV Intermittent (21 @ 00:08)        OTHER MEDS: MEDICATIONS  (STANDING):  acetaminophen     Tablet .. 650 every 6 hours PRN  atorvastatin 20 at bedtime  clopidogrel Tablet 75 daily  dextrose 40% Gel 15 once  dextrose 50% Injectable 25 once  dextrose 50% Injectable 12.5 once  dextrose 50% Injectable 25 once  enoxaparin Injectable 40 daily  glucagon  Injectable 1 once  insulin lispro (ADMELOG) corrective regimen sliding scale  three times a day before meals  insulin lispro (ADMELOG) corrective regimen sliding scale  at bedtime      SOCIAL HISTORY:     smoker  no etoh     FAMILY HISTORY:  FH: HTN (hypertension)        REVIEW OF SYSTEMS  [  ] ROS unobtainable because:    [X  ] All other systems negative except as noted below:	    Constitutional:  [ ] fever [ ] chills  [ ] weight loss  [ ] weakness  Skin:  [ ] rash [ ] phlebitis	  Eyes: [ ] icterus [ ] pain  [ ] discharge	  ENMT: [ ] sore throat  [ ] thrush [ ] ulcers [ ] exudates  Respiratory: [ ] dyspnea [ ] hemoptysis [ ] cough [ ] sputum	  Cardiovascular:  [ ] chest pain [ ] palpitations [ ] edema	  Gastrointestinal:  [ ] nausea [ ] vomiting [ ] diarrhea [ ] constipation [ ] pain	  Genitourinary:  [ ] dysuria [ ] frequency [ ] hematuria [ ] discharge [ ] flank pain  [ ] incontinence  Musculoskeletal:  [ ] myalgias [ ] arthralgias [ ] arthritis  [ ] back pain  Neurological:  [ ] headache [ ] seizures  [X ] confusion/altered mental status  Psychiatric:  [ ] anxiety [ ] depression	  Hematology/Lymphatics:  [ ] lymphadenopathy  Endocrine:  [ ] adrenal [ ] thyroid  Allergic/Immunologic:	 [ ] transplant [ ] seasonal    Vital Signs Last 24 Hrs  T(F): 98.2 (21 @ 09:44), Max: 101.9 (21 @ 21:51)    Vital Signs Last 24 Hrs  HR: 81 (21 @ 09:44) (65 - 81)  BP: 142/77 (21 @ 09:44) (108/65 - 142/77)  RR: 19 (21 @ 09:44)  SpO2: 98% (21 @ 09:44) (95% - 100%)  Wt(kg): --    PHYSICAL EXAM:  Constitutional: non-toxic, no distress, on RA   HEAD/EYES: anicteric, no conjunctival injection  ENT:  supple, no thrush  Cardiovascular:   normal S1, S2, no murmur, no edema  Respiratory:  clear BS bilaterally, no wheezes, no rales  GI:  soft, non-tender, normal bowel sounds  :  no nelson, no CVA tenderness  Musculoskeletal:  no synovitis, normal ROM  Neurologic: awake and alert to person though place and time he is confused   Skin:  no rash, no erythema, no phlebitis  Heme/Onc: no lymphadenopathy   Psychiatric:  awake, alert, appropriate mood          WBC Count: 4.37 K/uL ( @ 22:25)                            15.1   4.37  )-----------( 114      ( 2021 22:25 )             46.4           x   |  x   |  x   ----------------------------<  x   x    |  x   |  1.35<H>    Ca    9.2      2021 22:25  Phos  3.3       Mg     2.5         TPro  7.2  /  Alb  3.1<L>  /  TBili  0.3  /  DBili  0.1  /  AST  92<H>  /  ALT  38  /  AlkPhos  110        Creatinine Trend: 1.35<--, 1.79<--    Urinalysis Basic - ( 2021 23:57 )    Color: Yellow / Appearance: Slightly Turbid / S.020 / pH: x  Gluc: x / Ketone: Trace  / Bili: Negative / Urobili: 1 mg/dL   Blood: x / Protein: 100 mg/dL / Nitrite: Positive   Leuk Esterase: Trace / RBC: 3-5 /HPF / WBC 6-10   Sq Epi: x / Non Sq Epi: Occasional / Bacteria: Many        MICROBIOLOGY:    D-Dimer Assay, Quantitative: 222 ()    Procalcitonin, Serum: 0.30 (21 @ 08:04)      Flu With COVID-19 By KASEY (21 @ 22:25)    SARS-CoV-2 Result: Detected: EUA/IVD  This Respiratory Panel uses polymerase chain reaction (PCR) to detect for  influenza A; influenza B; and SARS-CoV-2.  This test was validated by [facility] and is in use under the FDA  Emergency Use Authorization (EUA) for clinical labs CLIA-certified to  perform high complexity testing. Test results should be correlated with  clinical presentation, patient history, and epidemiology.    Influenza A Result: NotDetec: EUA/IVD    Influenza B Result: NotDetec: EUA/IVD        RADIOLOGY:    < from: CT Head No Cont (21 @ 23:35) >  IMPRESSION: No intracranial hemorrhage or mass effect.          
[facility] NEPHROLOGY SERVICES, [facility]  NEPHROLOGY AND HYPERTENSION  [address]  [address]  [place] [address]  [phone]    MD SHEFALI PORTER MD ANDREY GONCHARUK, MD MADHU KORRAPATI, MD YELENA ROSENBERG, MD BINNY KOSHY, MD CHRISTOPHER CAPUTO, MD EDWARD BOVER, MD      Information from chart:  "Patient is a 85y old  Male who presents with a chief complaint of COVID, UTI (03 Dec 2021 15:45)    HPI:  Patient is an 85M with a PMH of dementia, CAD, HLD who presents to the ED for medical evaluation.  Patient currently AAOx2, can provide history however has no complaints.  Unsure why he is in the hospital.  Family could not be reached over the phone.  Per ED staff, patient has stopped taking his medications at home.  Also having generalized weakness, fever, and myalgia.  Febrile in ED to 101.9, labs show increased creatinine.  SpO2 as low as 88% on room air - improved to 95% on 2L via NC.  Will admit to med surg.     (2021 02:07)   "    Patient comfortable no distress    PAST MEDICAL & SURGICAL HISTORY:  Hyperlipidemia      FAMILY HISTORY:  FH: HTN (hypertension)      Allergies    No Known Allergies    Intolerances      Home Medications:    MEDICATIONS  (STANDING):  atorvastatin 20 milliGRAM(s) Oral at bedtime  clopidogrel Tablet 75 milliGRAM(s) Oral daily  dexAMETHasone  Injectable 6 milliGRAM(s) IV Push daily  dextrose 40% Gel 15 Gram(s) Oral once  dextrose 5% + sodium chloride 0.45%. 1000 milliLiter(s) (100 mL/Hr) IV Continuous <Continuous>  dextrose 5%. 1000 milliLiter(s) (50 mL/Hr) IV Continuous <Continuous>  dextrose 5%. 1000 milliLiter(s) (100 mL/Hr) IV Continuous <Continuous>  dextrose 50% Injectable 25 Gram(s) IV Push once  dextrose 50% Injectable 12.5 Gram(s) IV Push once  dextrose 50% Injectable 25 Gram(s) IV Push once  enoxaparin Injectable 40 milliGRAM(s) SubCutaneous daily  glucagon  Injectable 1 milliGRAM(s) IntraMuscular once  insulin lispro (ADMELOG) corrective regimen sliding scale   SubCutaneous three times a day before meals  insulin lispro (ADMELOG) corrective regimen sliding scale   SubCutaneous at bedtime  remdesivir  IVPB   IV Intermittent   remdesivir  IVPB 100 milliGRAM(s) IV Intermittent every 24 hours    MEDICATIONS  (PRN):  acetaminophen     Tablet .. 650 milliGRAM(s) Oral every 6 hours PRN Temp greater or equal to 38C (100.4F), Moderate Pain (4 - 6)    Vital Signs Last 24 Hrs  T(C): 36.8 (03 Dec 2021 17:21), Max: 37.3 (02 Dec 2021 23:42)  T(F): 98.2 (03 Dec 2021 17:21), Max: 99.2 (02 Dec 2021 23:42)  HR: 72 (03 Dec 2021 17:21) (69 - 96)  BP: 141/80 (03 Dec 2021 17:21) (117/68 - 141/80)  BP(mean): --  RR: 18 (03 Dec 2021 17:21) (18 - 20)  SpO2: 91% (03 Dec 2021 17:21) (91% - 100%)    Daily     Daily Weight in k.6 (03 Dec 2021 04:48)    21 @ 07:01  -  21 @ 07:00  --------------------------------------------------------  IN: 930 mL / OUT: 300 mL / NET: 630 mL    21 @ 07:01  -  21 @ 22:00  --------------------------------------------------------  IN: 540 mL / OUT: 300 mL / NET: 240 mL      CAPILLARY BLOOD GLUCOSE      POCT Blood Glucose.: 186 mg/dL (03 Dec 2021 16:32)  POCT Blood Glucose.: 160 mg/dL (03 Dec 2021 12:08)  POCT Blood Glucose.: 111 mg/dL (03 Dec 2021 09:09)    PHYSICAL EXAM:      T(C): 36.8 (21 @ 17:21), Max: 37.3 (21 @ 23:42)  HR: 72 (21 @ 17:21) (69 - 96)  BP: 141/80 (21 @ 17:21) (117/68 - 141/80)  RR: 18 (21 @ 17:21) (18 - 20)  SpO2: 91% (21 @ 17:21) (91% - 100%)  Wt(kg): --  Lungs diminished BS  Heart S1S2  Abd soft NT ND  Extremities:   tr edema                  148<H>  |  116<H>  |  49<H>  ----------------------------<  197<H>  4.2   |  24  |  1.45<H>    Ca    9.6      03 Dec 2021 18:26    TPro  6.7  /  Alb  2.4<L>  /  TBili  0.4  /  DBili  0.2  /  AST  82<H>  /  ALT  40  /  AlkPhos  86                            14.5   4.64  )-----------( 158      ( 02 Dec 2021 11:27 )             46.0     Creatinine Trend: 1.45<--, 1.42<--, 1.78<--, 1.47<--, 1.29<--, 1.10<--  Urinalysis Basic - ( 02 Dec 2021 12:16 )    Color: Yellow / Appearance: Clear / S.020 / pH: x  Gluc: x / Ketone: Negative  / Bili: Negative / Urobili: Negative mg/dL   Blood: x / Protein: 500 mg/dL / Nitrite: Negative   Leuk Esterase: Negative / RBC: 11-25 /HPF / WBC x   Sq Epi: x / Non Sq Epi: Occasional / Bacteria: Few            Assessment   BERTHA hypernatremia pre renal azotemia   Risk for COVID related ATN, GN    Plan  COVID protocol  Hypotonic IVF  Urine prot/ creat   Will follow     Bridger Hollins MD
Orthopaedic Spine Consult:                                               HPI: Pt is a 85y admitted for COVID PNA and UTI tx, w/ PMH of HTN, HLD, mild dementia per chart review. Pt was seen and examined with son at bedside. Patient does not express any complaint at back pain at present or radicular type symptoms, or paresthesias. Per son, patient has a history of "years" of chronic back pain that waxes/wanes in intensity, frequency and duration. No acute change. Patient/family deny any recent falls/trauma. No loss of bowel/bladder function. Denies saddle anesthesia. Denies fever/chills. Patient ambulates at baseline with a cane. Patient is a minimal community ambulator.        PAST MEDICAL & SURGICAL HISTORY:  HTN  Hyperlipidemia  Dementia      FAMILY HISTORY:  FH: HTN (hypertension)      Vital Signs Last 24 Hrs  T(C): 36.9 (30 Nov 2021 11:18), Max: 37.3 (30 Nov 2021 06:02)  T(F): 98.4 (30 Nov 2021 11:18), Max: 99.2 (30 Nov 2021 06:02)  HR: 94 (30 Nov 2021 11:18) (73 - 96)  BP: 92/53 (30 Nov 2021 11:18) (92/53 - 143/96)  BP(mean): --  RR: 18 (30 Nov 2021 11:18) (18 - 18)  SpO2: 92% (30 Nov 2021 11:18) (91% - 96%)  I&O's Detail    29 Nov 2021 07:01  -  30 Nov 2021 07:00  --------------------------------------------------------  IN:    Oral Fluid: 240 mL  Total IN: 240 mL    OUT:  Total OUT: 0 mL    Total NET: 240 mL          LABS:    11-29    x   |  x   |  x   ----------------------------<  x   x    |  x   |  1.10      TPro  6.3  /  Alb  2.7<L>  /  TBili  0.4  /  DBili  0.2  /  AST  83<H>  /  ALT  40  /  AlkPhos  97  11-29          RADIOLOGY & ADDITIONAL STUDIES:    PHYSICAL EXAM:  General; Awake and alert, Oriented x 3  Hips/Pelvis:   ABle to SLR bilaterally. Negative log roll, heel strike. No pain on passive Int/Ext hip rotation.  Spine exam:  Neck: supple, NT, Full Painless baseline AROM  Back:     PHYSICAL EXAM  GEN: NAD, AAOx3    SPINE:  Skin intact  NTTP over the bony prominences of the cervical/thoracic/lumbar/sacral spine  No Paraspinal TTP of the cervical/thoracic/lumbar/sacral spine  No bony step-offs  Grossly moving all extremities  + Radial Pulse  + DP/PT Pulses        MOTOR EXAM:                          Elbow Flex (C5)     Wrist Ext (C6)     Elbow Ext (C7)      Finger Flex (C8)    Finger Abduction (T1)  RIGHT                 5/5                         5/5                         5/5                          5/5                              5/5  LEFT                    5/5                         5/5                         5/5                          5/5                              5/5                           Hip Flex (L2)      Knee Ext (L3)      Ank Dorsiflex (L4)     Hallux Ext (L5)     Ank PlantarFlex (S1)  RIGHT               5/5                      5/5                          5/5                            5/5                           5/5  LEFT                  5/5                      5/5                          5/5                            5/5                           5/5      *Patient was hesitant to follow commands, did not wish to be evaluated but with much encouragement exam was able to be obtained  SENSORY EXAM:                        C5      C6      C7      C8       T1          RIGHT          2         2        2         2         2          (0=absent, 1=impaired, 2=normal, NT=not testable)  LEFT             2         2        2         2         2          (0=absent, 1=impaired, 2=normal, NT=not testable)                        L2        L3       L4      L5       S1          RIGHT        2          2         2        2        2           (0=absent, 1=impaired, 2=normal, NT=not testable)  LEFT           2          2         2        2        2           (0=absent, 1=impaired, 2=normal, NT=not testable)      Negative Mejia's sign bilaterally  Negative Babinski bilaterally   Negative Myoclonus bilaterally    MRI TSp: Marrow edema and discogenic endplate changes T1-4, T7-9. No acute Fx. Multiple HNPs, A small protrusion is noted at T7-T8. No tight stenosis noted. No cord edema or changes of myelopathy are noted. No intradural extramedullary lesion is depicted. Advanced degenerative changes also incidentally noted in the cervical spine. Multilevel degenerative changes but without significant stenosis. No cord edema or changes of myelopathy.    MRI LSp: Extensive degenerative changes, Sev foraminal narrowing at multiple levels, with moderate to severe central and recess narrowing in the mid and lower lumbar regio L4-5, L5-S1 spondy, mild thoracolumbar scoliosis, discogenic endplate changes from L1 to S1. No acute fx or destructive lesion noted. L1-2:HNP L>R; Facets and ligaments are mildly thickened; L2-4: HNP with hypertrophic changes of facets and ligaments, mild cent sten and foramin narrow; L4-5: A right paracentral herniation is noted with underlying spondylolisthesis and hypertrophic degenerative changes. There is moderate stenosis and foraminal narrowing; L5-S1: HNP, severe sten; No intradural abnormality. The distal cord is unremarkable in contour and signal; 9 cm right renal cyst                                             A/P : 84 yo M w/ age-related degenerative changes throughout the thoracic and lumbar spine, L4-5, L5-1 Spondy, Mild thoracolumbar scoliosis, central / foraminal stenosis thoughout    -WBAT, w/ assistive devices as needed  -PT/OT  -DVT ppx per primary  -No red flag symptoms for cord compression / cauda equina  -No s/sx spine infection  -Steroids not indicated as no acute flair of radicular type symptoms or exacerbation of discogenic back pain  -Pain control PRN  -Flexeril PRN  -XR Thoracic / lumbar spine (Ordered), for outpatient monitoring, will review  -MRI images reviewed   -No acute orthopaedic surgical intervention indicated at present  -Orthopaedically stable for discharge  -Follow up as an outpatient with Dr. Meek as symptoms warrant  -Will advise if plan changes  -Please reconsult in concern for cord compression / cauda equina / infection develops

## 2021-12-03 NOTE — PROGRESS NOTE ADULT - SUBJECTIVE AND OBJECTIVE BOX
INTERVAL HPI/OVERNIGHT EVENTS: Pt seen and examined at bedside. Pt is fatigued, Denies any complaints. No events overnight.    85y  Vital Signs Last 24 Hrs  T(C): 36.3 (03 Dec 2021 11:38), Max: 37.4 (02 Dec 2021 16:41)  T(F): 97.3 (03 Dec 2021 11:38), Max: 99.3 (02 Dec 2021 16:41)  HR: 82 (03 Dec 2021 11:38) (69 - 96)  BP: 121/79 (03 Dec 2021 11:38) (114/75 - 130/83)  BP(mean): --  RR: 19 (03 Dec 2021 11:38) (18 - 20)  SpO2: 94% (03 Dec 2021 11:38) (92% - 100%)  I&O's Summary    02 Dec 2021 07:01  -  03 Dec 2021 07:00  --------------------------------------------------------  IN: 930 mL / OUT: 300 mL / NET: 630 mL    03 Dec 2021 07:01  -  03 Dec 2021 15:43  --------------------------------------------------------  IN: 300 mL / OUT: 300 mL / NET: 0 mL      MEDICATIONS  (STANDING):  atorvastatin 20 milliGRAM(s) Oral at bedtime  clopidogrel Tablet 75 milliGRAM(s) Oral daily  dexAMETHasone  Injectable 6 milliGRAM(s) IV Push daily  dextrose 40% Gel 15 Gram(s) Oral once  dextrose 5% + sodium chloride 0.45%. 1000 milliLiter(s) (100 mL/Hr) IV Continuous <Continuous>  dextrose 5%. 1000 milliLiter(s) (50 mL/Hr) IV Continuous <Continuous>  dextrose 5%. 1000 milliLiter(s) (100 mL/Hr) IV Continuous <Continuous>  dextrose 50% Injectable 25 Gram(s) IV Push once  dextrose 50% Injectable 12.5 Gram(s) IV Push once  dextrose 50% Injectable 25 Gram(s) IV Push once  enoxaparin Injectable 40 milliGRAM(s) SubCutaneous daily  glucagon  Injectable 1 milliGRAM(s) IntraMuscular once  insulin lispro (ADMELOG) corrective regimen sliding scale   SubCutaneous three times a day before meals  insulin lispro (ADMELOG) corrective regimen sliding scale   SubCutaneous at bedtime  remdesivir  IVPB   IV Intermittent   remdesivir  IVPB 100 milliGRAM(s) IV Intermittent every 24 hours    MEDICATIONS  (PRN):  acetaminophen     Tablet .. 650 milliGRAM(s) Oral every 6 hours PRN Temp greater or equal to 38C (100.4F), Moderate Pain (4 - 6)    LABS:    trop                        14.5   4.64  )-----------( 158      ( 02 Dec 2021 11:27 )             46.0     12    152<H>  |  122<H>  |  56<H>  ----------------------------<  135<H>  4.4   |  23  |  1.42<H>    Ca    9.2      03 Dec 2021 07:05    TPro  6.7  /  Alb  2.4<L>  /  TBili  0.4  /  DBili  0.2  /  AST  82<H>  /  ALT  40  /  AlkPhos  86  12-03      Urinalysis Basic - ( 02 Dec 2021 12:16 )    Color: Yellow / Appearance: Clear / S.020 / pH: x  Gluc: x / Ketone: Negative  / Bili: Negative / Urobili: Negative mg/dL   Blood: x / Protein: 500 mg/dL / Nitrite: Negative   Leuk Esterase: Negative / RBC: 11-25 /HPF / WBC x   Sq Epi: x / Non Sq Epi: Occasional / Bacteria: Few      CAPILLARY BLOOD GLUCOSE      POCT Blood Glucose.: 160 mg/dL (03 Dec 2021 12:08)  POCT Blood Glucose.: 111 mg/dL (03 Dec 2021 09:09)  POCT Blood Glucose.: 162 mg/dL (02 Dec 2021 21:15)  POCT Blood Glucose.: 148 mg/dL (02 Dec 2021 16:49)        Urinalysis Basic - ( 02 Dec 2021 12:16 )    Color: Yellow / Appearance: Clear / S.020 / pH: x  Gluc: x / Ketone: Negative  / Bili: Negative / Urobili: Negative mg/dL   Blood: x / Protein: 500 mg/dL / Nitrite: Negative   Leuk Esterase: Negative / RBC: 11-25 /HPF / WBC x   Sq Epi: x / Non Sq Epi: Occasional / Bacteria: Few      RADIOLOGY & ADDITIONAL TESTS:    Imaging Personally Reviewed:  [ ] YES  [ ] NO    Consultant(s) Notes Reviewed:  [x ] YES  [ ] NO    PHYSICAL EXAM:  GENERAL: NAD  NERVOUS SYSTEM:  Alert & Oriented X2, moves all extremities.  CHEST/LUNG: mild crackles b/l.  HEART: Regular rate and rhythm; No murmurs, rubs, or gallops  ABDOMEN: Soft, Nontender, Nondistended; Bowel sounds present  EXTREMITIES:  2+ Peripheral Pulses, No clubbing, cyanosis, or edema      A & P:        Care Discussed with Consultants/Other Providers [ x] YES  [ ] NO

## 2021-12-03 NOTE — CONSULT NOTE ADULT - REASON FOR ADMISSION
COVID, UTI
Is This A New Presentation, Or A Follow-Up?: Skin Lesions
Have Your Skin Lesions Been Treated?: not been treated

## 2021-12-03 NOTE — PROGRESS NOTE ADULT - ASSESSMENT
85M with a PMH of dementia, CAD, HLD who presents to the ED for medical evaluation.    Patient had fever and initially thought to have low oxygen level though most room air documentation seems to be above 94%   today he was taken off oxygen and saturation remains consistently above 94%  fever has resolved   UA only mildly possible with 6-10 WBC and he denies any urinary symptoms  if not requiring oxygen he does not need RDV or dexamethasone  additionally I do not believe he has a UTI and his fever is more likely related to covid   would stop antibiotics, rdv and dex     11/30: no fevers since 11/25, remains on RA, no new lab work, blood cultures remain with no growth, UC grew E. Coli, pt denies any urinary symptoms at the moment, was started on ceftriaxone day #3 by the medical staff.   12/1: Pt with COVID 19 diagnosis, remains on RA, blood cultures with no growth to date, treatment for UTI complete, no role for any more antimicrobials at the moment.   12/2: having fevers today, now requires supplemental O2, started on remdesivir and decadron. Procalcitonin is low, CXR with worsening, personally reviewed, will obtain two sets of BC. Pt's symptoms most likely related to COVID 19. Discussed with pt's son, all questions answered to the best of my ability.   12/3: remains on nc, rdv and decadron, will try to wean off oxygen      Plan  #COVID  Monitor clinically.  Monitor Oxygenation  O2 supplementation.  Remdesivir - up to 5 days depending on clinical course.  Monitor CBC, CMP daily  Ferritin, CRP, and D dimer q 48 hrs.  IV Dexamethasone 6mg q24hrs x10 days   Anticoagulation per protocol.  Treatment options are limited-this as well as limitations of data discussed with pt.  Monitor for any bacterial superinfection/complications.  This tx plan was formulated utilizing my clinical judgement, currently available local/regional anecdotal information, organizational treatment recommendations with COVID-19 specific considerations given rapidly changing information available.     dementia  -continue to orient   -make sure patient is calm and not pulling off oxygen     Dr. Bui will be covering this weekend. To reach him, please call [phone]     Gentry Vinson DO  Infectious Disease Attending  Pager [phone]  After 5pm/weekends please call [phone] for all inquiries and new consults     discussed with Dr. Torres

## 2021-12-03 NOTE — DIETITIAN INITIAL EVALUATION ADULT. - PERTINENT LABORATORY DATA
12-03 Na152 mmol/L<H> Glu 135 mg/dL<H> K+ 4.4 mmol/L Cr  1.42 mg/dL<H> BUN 56 mg/dL<H> 11-27 Phos 3.0 mg/dL 12-03 Alb 2.4 g/dL<L>12-03 ALT 40 U/L AST 82 U/L<H> Alkaline Phosphatase 86 [id number] @ 10:34 A1C 5.8

## 2021-12-03 NOTE — DIETITIAN NUTRITION RISK NOTIFICATION - TREATMENT: THE FOLLOWING DIET HAS BEEN RECOMMENDED
Diet, DASH/TLC:   Sodium & Cholesterol Restricted  Supplement Feeding Modality:  Oral  Ensure Enlive Cans or Servings Per Day:  2       Frequency:  Daily (11-30-21 @ 16:20) [Active]

## 2021-12-03 NOTE — DIETITIAN INITIAL EVALUATION ADULT. - PERTINENT MEDS FT
MEDICATIONS  (STANDING):  atorvastatin 20 milliGRAM(s) Oral at bedtime  clopidogrel Tablet 75 milliGRAM(s) Oral daily  dexAMETHasone  Injectable 6 milliGRAM(s) IV Push daily  dextrose 40% Gel 15 Gram(s) Oral once  dextrose 5%. 1000 milliLiter(s) (50 mL/Hr) IV Continuous <Continuous>  dextrose 5%. 1000 milliLiter(s) (100 mL/Hr) IV Continuous <Continuous>  dextrose 50% Injectable 25 Gram(s) IV Push once  dextrose 50% Injectable 12.5 Gram(s) IV Push once  dextrose 50% Injectable 25 Gram(s) IV Push once  enoxaparin Injectable 40 milliGRAM(s) SubCutaneous daily  glucagon  Injectable 1 milliGRAM(s) IntraMuscular once  insulin lispro (ADMELOG) corrective regimen sliding scale   SubCutaneous three times a day before meals  insulin lispro (ADMELOG) corrective regimen sliding scale   SubCutaneous at bedtime  remdesivir  IVPB   IV Intermittent   remdesivir  IVPB 100 milliGRAM(s) IV Intermittent every 24 hours  sodium chloride 0.9%. 1000 milliLiter(s) (75 mL/Hr) IV Continuous <Continuous>    MEDICATIONS  (PRN):  acetaminophen     Tablet .. 650 milliGRAM(s) Oral every 6 hours PRN Temp greater or equal to 38C (100.4F), Moderate Pain (4 - 6)

## 2021-12-03 NOTE — PROGRESS NOTE ADULT - ASSESSMENT
85 y o M with PMH of Dementia, CAD, DLD presented with generalized weakness, fever, myalgias.    COVID-19 PNA  - c/w supplemental O2  - c/w Remdesivir and decadron #2  - CXR . Scattered bilateral patchy opacities consistent with Covid 19 pneumonia in this clinical setting.   -Tylenol PRN fever  -isolation   - ID on board.    Acute cystitis.   -culture growing E.coli.  - s/p rocephin    Back pain   MR of thoracic/lumbar spine 1. Extensive degenerative changes with underlying thoracolumbar scoliosis. Multiple subluxations along with disc disease and hypertrophic changes of the posterior elements.  2. Severe foraminal narrowing at multiple levels, with moderate to severe central and recess narrowing in the mid and lower lumbar region.  No intervention as per ortho.  Pain control.  -PT evaluation - FARHAT     BERTHA    - c/w IVF  - monitor bmp.  - Nephro eval.    CAD  s/p stents few years ago as per daughter  -continue plavix, statin     Hyperlipidemia.   -continue lipitor.    Hypernatremia  started on D5 1/2 NS  f/u bmp      DVT prop: Lovenox 40 daily.  Spoke with daughter Enid ( [phone] ) and updated the plan of care. 11/29, 30, 12/1,2,3 85 y o M with PMH of Dementia, CAD, DLD presented with generalized weakness, fever, myalgias.    COVID-19 PNA  - c/w supplemental O2  - c/w Remdesivir and decadron #2  - CXR . Scattered bilateral patchy opacities consistent with Covid 19 pneumonia in this clinical setting.   -Tylenol PRN fever  -isolation   - ID on board.  - Monitor CBC, CMP daily  Ferritin, CRP, and D dimer q 48 hrs.    Acute cystitis.   -culture growing E.coli.  - s/p rocephin    Back pain   MR of thoracic/lumbar spine 1. Extensive degenerative changes with underlying thoracolumbar scoliosis. Multiple subluxations along with disc disease and hypertrophic changes of the posterior elements.  2. Severe foraminal narrowing at multiple levels, with moderate to severe central and recess narrowing in the mid and lower lumbar region.  No intervention as per ortho.  Pain control.  -PT evaluation - FARHAT     BERTHA    - c/w IVF  - monitor bmp.  - Nephro eval.    CAD  s/p stents few years ago as per daughter  -continue plavix, statin     Hyperlipidemia.   -continue lipitor.    Hypernatremia  started on D5 1/2 NS  f/u bmp      DVT prop: Lovenox 40 daily.  Spoke with daughter Enid ( [phone] ) and updated the plan of care. 11/29, 30, 12/1,2,3

## 2021-12-04 LAB
ALBUMIN SERPL ELPH-MCNC: 2.5 G/DL — LOW (ref 3.3–5)
ALBUMIN SERPL ELPH-MCNC: 2.6 G/DL — LOW (ref 3.3–5)
ALP SERPL-CCNC: 80 U/L — SIGNIFICANT CHANGE UP (ref 40–120)
ALP SERPL-CCNC: 82 U/L — SIGNIFICANT CHANGE UP (ref 40–120)
ALT FLD-CCNC: 43 U/L — SIGNIFICANT CHANGE UP (ref 12–78)
ALT FLD-CCNC: 44 U/L — SIGNIFICANT CHANGE UP (ref 12–78)
ANION GAP SERPL CALC-SCNC: 6 MMOL/L — SIGNIFICANT CHANGE UP (ref 5–17)
AST SERPL-CCNC: 82 U/L — HIGH (ref 15–37)
AST SERPL-CCNC: 82 U/L — HIGH (ref 15–37)
BILIRUB DIRECT SERPL-MCNC: 0.2 MG/DL — SIGNIFICANT CHANGE UP (ref 0–0.3)
BILIRUB INDIRECT FLD-MCNC: 0.3 MG/DL — SIGNIFICANT CHANGE UP (ref 0.2–1)
BILIRUB SERPL-MCNC: 0.4 MG/DL — SIGNIFICANT CHANGE UP (ref 0.2–1.2)
BILIRUB SERPL-MCNC: 0.5 MG/DL — SIGNIFICANT CHANGE UP (ref 0.2–1.2)
BUN SERPL-MCNC: 46 MG/DL — HIGH (ref 7–23)
CALCIUM SERPL-MCNC: 9.6 MG/DL — SIGNIFICANT CHANGE UP (ref 8.5–10.1)
CHLORIDE SERPL-SCNC: 118 MMOL/L — HIGH (ref 96–108)
CO2 SERPL-SCNC: 26 MMOL/L — SIGNIFICANT CHANGE UP (ref 22–31)
CREAT SERPL-MCNC: 1.13 MG/DL — SIGNIFICANT CHANGE UP (ref 0.5–1.3)
CREAT SERPL-MCNC: 1.18 MG/DL — SIGNIFICANT CHANGE UP (ref 0.5–1.3)
D DIMER BLD IA.RAPID-MCNC: 264 NG/ML DDU — HIGH
GLUCOSE BLDC GLUCOMTR-MCNC: 117 MG/DL — HIGH (ref 70–99)
GLUCOSE BLDC GLUCOMTR-MCNC: 123 MG/DL — HIGH (ref 70–99)
GLUCOSE BLDC GLUCOMTR-MCNC: 155 MG/DL — HIGH (ref 70–99)
GLUCOSE BLDC GLUCOMTR-MCNC: 183 MG/DL — HIGH (ref 70–99)
GLUCOSE SERPL-MCNC: 124 MG/DL — HIGH (ref 70–99)
NT-PROBNP SERPL-SCNC: 181 PG/ML — SIGNIFICANT CHANGE UP (ref 0–450)
POTASSIUM SERPL-MCNC: 3.9 MMOL/L — SIGNIFICANT CHANGE UP (ref 3.5–5.3)
POTASSIUM SERPL-SCNC: 3.9 MMOL/L — SIGNIFICANT CHANGE UP (ref 3.5–5.3)
PROT SERPL-MCNC: 6.8 GM/DL — SIGNIFICANT CHANGE UP (ref 6–8.3)
PROT SERPL-MCNC: 6.9 GM/DL — SIGNIFICANT CHANGE UP (ref 6–8.3)
SODIUM SERPL-SCNC: 150 MMOL/L — HIGH (ref 135–145)

## 2021-12-04 PROCEDURE — 99233 SBSQ HOSP IP/OBS HIGH 50: CPT

## 2021-12-04 RX ORDER — SODIUM CHLORIDE 9 MG/ML
1000 INJECTION, SOLUTION INTRAVENOUS
Refills: 0 | Status: DISCONTINUED | OUTPATIENT
Start: 2021-12-04 | End: 2021-12-05

## 2021-12-04 RX ADMIN — ENOXAPARIN SODIUM 40 MILLIGRAM(S): 100 INJECTION SUBCUTANEOUS at 12:12

## 2021-12-04 RX ADMIN — ATORVASTATIN CALCIUM 20 MILLIGRAM(S): 80 TABLET, FILM COATED ORAL at 21:42

## 2021-12-04 RX ADMIN — REMDESIVIR 500 MILLIGRAM(S): 5 INJECTION INTRAVENOUS at 14:12

## 2021-12-04 RX ADMIN — Medication 1: at 16:38

## 2021-12-04 RX ADMIN — SODIUM CHLORIDE 100 MILLILITER(S): 9 INJECTION, SOLUTION INTRAVENOUS at 16:38

## 2021-12-04 RX ADMIN — CLOPIDOGREL BISULFATE 75 MILLIGRAM(S): 75 TABLET, FILM COATED ORAL at 12:12

## 2021-12-04 RX ADMIN — Medication 6 MILLIGRAM(S): at 05:10

## 2021-12-04 NOTE — PROGRESS NOTE ADULT - ASSESSMENT
85 y o M with PMH of Dementia, CAD, DLD presented with generalized weakness, fever, myalgias.    Acute hypoxic resp failure, COVID-19 PNA, Sleep apnea  - c/w supplemental O2  - c/w Remdesivir and decadron  - CXR . Scattered bilateral patchy opacities consistent with Covid 19 pneumonia in this clinical setting.   -Tylenol PRN fever  -isolation   - ID on board.  - Monitor CBC, CMP daily  - Ferritin, CRP, and D dimer q 48 hrs.    Acute cystitis.   -culture growing E.coli.  - s/p rocephin    Dysphagia  Severe protein-calorie malnutrition-   Diet Soft and Bite Sized-  Supplement Feeding Modality:  Oral  Ensure Enlive Cans or Servings Per Day:  1       Frequency:  Two Times a day  aspiration precaution    Back pain  MR of thoracic/lumbar spine 1. Extensive degenerative changes with underlying thoracolumbar scoliosis. Multiple subluxations along with disc disease and hypertrophic changes of the posterior elements. 2. Severe foraminal narrowing at multiple levels, with moderate to severe central and recess narrowing in the mid and lower lumbar region.  No intervention as per ortho.  Pain control.  XR L/T spine pending   -PT evaluation - FARHAT     BERTHA    - c/w IVF  - monitor bmp.  - Nephro eval.    CAD s/p stents few years ago as per daughter  -continue plavix, statin    Hyperlipidemia.   -continue lipitor.    Hypernatremia- 150  started on D5 1/2 NS  f/u bmp    dementia  -continue to orient   -make sure patient is calm and not pulling off oxygen    DVT prop: Lovenox 40 daily.  Spoke with daughter Enid ( [phone] ) and updated the plan of care.  Dispo; as per sw;  'PT recommends subacute rehab. Pt is COVID positive and needs to remain in the hospital for 10 days post COVID diag on 11/25. Pt will need a COVID reteat on Sunday and then [facility] will be sent out for  evaluation and for [employer]'

## 2021-12-04 NOTE — PROGRESS NOTE ADULT - SUBJECTIVE AND OBJECTIVE BOX
Patient is a 85y old  Male who presents with a chief complaint of COVID, UTI (03 Dec 2021 15:45)      OVERNIGHT EVENTS:    REVIEW OF SYSTEMS: denies chest pain/SOB, diaphoresis, no F/C, cough, dizziness, headache, blurry vision, nausea, vomiting, abdominal pain. All others review of systems negative     MEDICATIONS  (STANDING):  atorvastatin 20 milliGRAM(s) Oral at bedtime  clopidogrel Tablet 75 milliGRAM(s) Oral daily  dexAMETHasone  Injectable 6 milliGRAM(s) IV Push daily  dextrose 40% Gel 15 Gram(s) Oral once  dextrose 5% + sodium chloride 0.45%. 1000 milliLiter(s) (100 mL/Hr) IV Continuous <Continuous>  dextrose 5%. 1000 milliLiter(s) (50 mL/Hr) IV Continuous <Continuous>  dextrose 5%. 1000 milliLiter(s) (100 mL/Hr) IV Continuous <Continuous>  dextrose 50% Injectable 25 Gram(s) IV Push once  dextrose 50% Injectable 12.5 Gram(s) IV Push once  dextrose 50% Injectable 25 Gram(s) IV Push once  enoxaparin Injectable 40 milliGRAM(s) SubCutaneous daily  glucagon  Injectable 1 milliGRAM(s) IntraMuscular once  insulin lispro (ADMELOG) corrective regimen sliding scale   SubCutaneous three times a day before meals  insulin lispro (ADMELOG) corrective regimen sliding scale   SubCutaneous at bedtime  remdesivir  IVPB   IV Intermittent   remdesivir  IVPB 100 milliGRAM(s) IV Intermittent every 24 hours    MEDICATIONS  (PRN):  acetaminophen     Tablet .. 650 milliGRAM(s) Oral every 6 hours PRN Temp greater or equal to 38C (100.4F), Moderate Pain (4 - 6)      Allergies    No Known Allergies    Intolerances        T(F): 97.6 (21 @ 05:49), Max: 98.2 (21 @ 17:21)  HR: 81 (21 @ 05:49) (65 - 81)  BP: 127/72 (21 @ 05:49) (123/85 - 141/80)  RR: 18 (21 @ 05:49) (18 - 19)  SpO2: 94% (21 @ 05:49) (91% - 94%)  Wt(kg): --    PHYSICAL EXAM:  GENERAL: NAD  HEAD:  Atraumatic, Normocephalic  EYES: PERRLA, conjunctiva and sclera clear  ENMT: Moist mucous membranes  NECK: Supple, No JVD, Normal thyroid  NERVOUS SYSTEM:  Alert & Awake  CHEST/LUNG: Clear to percussion bilaterally;   HEART: Regular rate and rhythm;   ABDOMEN: Soft, Nontender, Nondistended; Bowel sounds present  EXTREMITIES:  no edema BL LE  SKIN: moist    LABS:        150<H>  |  118<H>  |  46<H>  ----------------------------<  124<H>  3.9   |  26  |  1.18    Ca    9.6      04 Dec 2021 09:42    TPro  6.8  /  Alb  2.6<L>  /  TBili  0.4  /  DBili  0.2  /  AST  82<H>  /  ALT  43  /  AlkPhos  80        Urinalysis Basic - ( 02 Dec 2021 12:16 )    Color: Yellow / Appearance: Clear / S.020 / pH: x  Gluc: x / Ketone: Negative  / Bili: Negative / Urobili: Negative mg/dL   Blood: x / Protein: 500 mg/dL / Nitrite: Negative   Leuk Esterase: Negative / RBC: 11-25 /HPF / WBC x   Sq Epi: x / Non Sq Epi: Occasional / Bacteria: Few      Cultures;   CAPILLARY BLOOD GLUCOSE      POCT Blood Glucose.: 117 mg/dL (04 Dec 2021 08:40)  POCT Blood Glucose.: 157 mg/dL (03 Dec 2021 22:28)  POCT Blood Glucose.: 186 mg/dL (03 Dec 2021 16:32)  POCT Blood Glucose.: 160 mg/dL (03 Dec 2021 12:08)    Lipid panel:           RADIOLOGY & ADDITIONAL TESTS:    Imaging Personally Reviewed:  [x ] YES      Consultant(s) Notes Reviewed:  [x ] YES     Care Discussed with [x ] Consultants [X ] Patient [ ] Family  [x ]    [x ]  Other; RN

## 2021-12-04 NOTE — PROGRESS NOTE ADULT - SUBJECTIVE AND OBJECTIVE BOX
[facility] NEPHROLOGY SERVICES, [facility]  NEPHROLOGY AND HYPERTENSION  [address]  [address]  [place] [address]  [phone]    MD SHEFALI PORTER MD ANDREY GONCHARUK, MD MADHU KORRAPATI, MD YELENA ROSENBERG, MD BINNY KOSHY, MD CHRISTOPHER CAPUTO, MD GERALDINE JOHNSON MD          Patient events noted    MEDICATIONS  (STANDING):  atorvastatin 20 milliGRAM(s) Oral at bedtime  clopidogrel Tablet 75 milliGRAM(s) Oral daily  dexAMETHasone  Injectable 6 milliGRAM(s) IV Push daily  dextrose 40% Gel 15 Gram(s) Oral once  dextrose 5%. 1000 milliLiter(s) (100 mL/Hr) IV Continuous <Continuous>  dextrose 5%. 1000 milliLiter(s) (50 mL/Hr) IV Continuous <Continuous>  dextrose 5%. 1000 milliLiter(s) (100 mL/Hr) IV Continuous <Continuous>  dextrose 50% Injectable 25 Gram(s) IV Push once  dextrose 50% Injectable 12.5 Gram(s) IV Push once  dextrose 50% Injectable 25 Gram(s) IV Push once  enoxaparin Injectable 40 milliGRAM(s) SubCutaneous daily  glucagon  Injectable 1 milliGRAM(s) IntraMuscular once  insulin lispro (ADMELOG) corrective regimen sliding scale   SubCutaneous three times a day before meals  insulin lispro (ADMELOG) corrective regimen sliding scale   SubCutaneous at bedtime  remdesivir  IVPB   IV Intermittent   remdesivir  IVPB 100 milliGRAM(s) IV Intermittent every 24 hours    MEDICATIONS  (PRN):  acetaminophen     Tablet .. 650 milliGRAM(s) Oral every 6 hours PRN Temp greater or equal to 38C (100.4F), Moderate Pain (4 - 6)      12-03-21 @ 07:01  -  12-04-21 @ 07:00  --------------------------------------------------------  IN: 1740 mL / OUT: 300 mL / NET: 1440 mL      PHYSICAL EXAM:      T(C): 36.2 (12-04-21 @ 17:15), Max: 36.7 (12-03-21 @ 23:44)  HR: 87 (12-04-21 @ 17:15) (65 - 87)  BP: 112/71 (12-04-21 @ 17:15) (112/71 - 127/72)  RR: 18 (12-04-21 @ 17:15) (17 - 19)  SpO2: 91% (12-04-21 @ 17:15) (90% - 94%)  Wt(kg): --  Lungs clear  Heart S1S2  Abd soft NT ND  Extremities:   tr edema                12-04    150<H>  |  118<H>  |  46<H>  ----------------------------<  124<H>  3.9   |  26  |  1.18    Ca    9.6      04 Dec 2021 09:42    TPro  6.8  /  Alb  2.6<L>  /  TBili  0.4  /  DBili  0.2  /  AST  82<H>  /  ALT  43  /  AlkPhos  80  12-04      LIVER FUNCTIONS - ( 04 Dec 2021 09:42 )  Alb: 2.6 g/dL / Pro: 6.8 gm/dL / ALK PHOS: 80 U/L / ALT: 43 U/L / AST: 82 U/L / GGT: x           Creatinine Trend: 1.18<--, 1.45<--, 1.42<--, 1.78<--, 1.47<--, 1.29<--      Assessment   BERTHA pre renal azotemia;   Hypernatremia     Plan:  Continue D5W;   Supportive care    Bridger Hollins MD

## 2021-12-05 LAB
ALBUMIN SERPL ELPH-MCNC: 2.4 G/DL — LOW (ref 3.3–5)
ALP SERPL-CCNC: 78 U/L — SIGNIFICANT CHANGE UP (ref 40–120)
ALT FLD-CCNC: 48 U/L — SIGNIFICANT CHANGE UP (ref 12–78)
ANION GAP SERPL CALC-SCNC: 7 MMOL/L — SIGNIFICANT CHANGE UP (ref 5–17)
AST SERPL-CCNC: 89 U/L — HIGH (ref 15–37)
BILIRUB DIRECT SERPL-MCNC: 0.2 MG/DL — SIGNIFICANT CHANGE UP (ref 0–0.3)
BILIRUB INDIRECT FLD-MCNC: 0.2 MG/DL — SIGNIFICANT CHANGE UP (ref 0.2–1)
BILIRUB SERPL-MCNC: 0.4 MG/DL — SIGNIFICANT CHANGE UP (ref 0.2–1.2)
BUN SERPL-MCNC: 43 MG/DL — HIGH (ref 7–23)
CALCIUM SERPL-MCNC: 9.2 MG/DL — SIGNIFICANT CHANGE UP (ref 8.5–10.1)
CHLORIDE SERPL-SCNC: 113 MMOL/L — HIGH (ref 96–108)
CO2 SERPL-SCNC: 26 MMOL/L — SIGNIFICANT CHANGE UP (ref 22–31)
CREAT ?TM UR-MCNC: 103 MG/DL — SIGNIFICANT CHANGE UP
CREAT SERPL-MCNC: 1.05 MG/DL — SIGNIFICANT CHANGE UP (ref 0.5–1.3)
CREAT SERPL-MCNC: 1.11 MG/DL — SIGNIFICANT CHANGE UP (ref 0.5–1.3)
CRP SERPL-MCNC: 50 MG/L — HIGH
FERRITIN SERPL-MCNC: 1700 NG/ML — HIGH (ref 30–400)
GLUCOSE BLDC GLUCOMTR-MCNC: 125 MG/DL — HIGH (ref 70–99)
GLUCOSE BLDC GLUCOMTR-MCNC: 89 MG/DL — SIGNIFICANT CHANGE UP (ref 70–99)
GLUCOSE BLDC GLUCOMTR-MCNC: 97 MG/DL — SIGNIFICANT CHANGE UP (ref 70–99)
GLUCOSE SERPL-MCNC: 96 MG/DL — SIGNIFICANT CHANGE UP (ref 70–99)
POTASSIUM SERPL-MCNC: 4 MMOL/L — SIGNIFICANT CHANGE UP (ref 3.5–5.3)
POTASSIUM SERPL-SCNC: 4 MMOL/L — SIGNIFICANT CHANGE UP (ref 3.5–5.3)
PROT ?TM UR-MCNC: 152 MG/DL — HIGH (ref 0–12)
PROT SERPL-MCNC: 6.4 GM/DL — SIGNIFICANT CHANGE UP (ref 6–8.3)
PROT/CREAT UR-RTO: 1.5 RATIO — HIGH (ref 0–0.2)
SODIUM SERPL-SCNC: 146 MMOL/L — HIGH (ref 135–145)

## 2021-12-05 PROCEDURE — 99233 SBSQ HOSP IP/OBS HIGH 50: CPT

## 2021-12-05 RX ORDER — SODIUM CHLORIDE 9 MG/ML
1000 INJECTION, SOLUTION INTRAVENOUS
Refills: 0 | Status: DISCONTINUED | OUTPATIENT
Start: 2021-12-05 | End: 2021-12-08

## 2021-12-05 RX ADMIN — ENOXAPARIN SODIUM 40 MILLIGRAM(S): 100 INJECTION SUBCUTANEOUS at 11:43

## 2021-12-05 RX ADMIN — ATORVASTATIN CALCIUM 20 MILLIGRAM(S): 80 TABLET, FILM COATED ORAL at 21:15

## 2021-12-05 RX ADMIN — Medication 0: at 21:36

## 2021-12-05 RX ADMIN — Medication 6 MILLIGRAM(S): at 05:15

## 2021-12-05 RX ADMIN — REMDESIVIR 500 MILLIGRAM(S): 5 INJECTION INTRAVENOUS at 14:01

## 2021-12-05 RX ADMIN — CLOPIDOGREL BISULFATE 75 MILLIGRAM(S): 75 TABLET, FILM COATED ORAL at 11:43

## 2021-12-05 NOTE — PROGRESS NOTE ADULT - ASSESSMENT
85 y o M with PMH of Dementia, CAD, DLD presented with generalized weakness, fever, myalgias.    Acute hypoxic resp failure, COVID-19 PNA, Sleep apnea  - c/w supplemental O2  - c/w Remdesivir and decadron  - CXR . Scattered bilateral patchy opacities consistent with Covid 19 pneumonia in this clinical setting.   -Tylenol PRN fever  -isolation   - ID on board.  - Monitor CBC, CMP daily  - Ferritin, CRP, and D dimer q 48 hrs.    Acute cystitis.   -culture growing E.coli.  - s/p rocephin    Dysphagia  Severe protein-calorie malnutrition-   Diet Soft and Bite Sized-  Supplement Feeding Modality:  Oral  Ensure Enlive Cans or Servings Per Day:  1       Frequency:  Two Times a day  aspiration precaution    Back pain  MR of thoracic/lumbar spine 1. Extensive degenerative changes with underlying thoracolumbar scoliosis. Multiple subluxations along with disc disease and hypertrophic changes of the posterior elements. 2. Severe foraminal narrowing at multiple levels, with moderate to severe central and recess narrowing in the mid and lower lumbar region.  No intervention as per ortho.  Pain control.  XR L/T spine pending   -PT evaluation - FARHAT     BERTHA    - c/w IVF  - monitor bmp.  - Nephro eval.    CAD s/p stents few years ago as per daughter  -continue plavix, statin    Hyperlipidemia.   -continue lipitor.    Hypernatremia- 150  started on D5 1/2 NS  f/u bmp    dementia  -continue to orient   -make sure patient is calm and not pulling off oxygen    DVT prop: Lovenox 40 daily.  Spoke with daughter Enid ( [phone] ) and updated the plan of care.  Dispo; as per sw;  'PT recommends subacute rehab. Pt is COVID positive and needs to remain in the hospital for 10 days post COVID diag on 11/25. Pt will need a COVID reteat on Sunday which is sent out and then [facility] will be sent out for evaluation and for [employer]'

## 2021-12-05 NOTE — PROGRESS NOTE ADULT - SUBJECTIVE AND OBJECTIVE BOX
Patient is a 85y old  Male who presents with a chief complaint of COVID, UTI (04 Dec 2021 21:15)      OVERNIGHT EVENTS:    REVIEW OF SYSTEMS: denies chest pain/SOB, diaphoresis, no F/C, cough, dizziness, headache, blurry vision, nausea, vomiting, abdominal pain. All others review of systems negative     MEDICATIONS  (STANDING):  atorvastatin 20 milliGRAM(s) Oral at bedtime  clopidogrel Tablet 75 milliGRAM(s) Oral daily  dexAMETHasone  Injectable 6 milliGRAM(s) IV Push daily  dextrose 40% Gel 15 Gram(s) Oral once  dextrose 5%. 1000 milliLiter(s) (100 mL/Hr) IV Continuous <Continuous>  dextrose 5%. 1000 milliLiter(s) (100 mL/Hr) IV Continuous <Continuous>  dextrose 5%. 1000 milliLiter(s) (50 mL/Hr) IV Continuous <Continuous>  dextrose 50% Injectable 25 Gram(s) IV Push once  dextrose 50% Injectable 12.5 Gram(s) IV Push once  dextrose 50% Injectable 25 Gram(s) IV Push once  enoxaparin Injectable 40 milliGRAM(s) SubCutaneous daily  glucagon  Injectable 1 milliGRAM(s) IntraMuscular once  insulin lispro (ADMELOG) corrective regimen sliding scale   SubCutaneous three times a day before meals  insulin lispro (ADMELOG) corrective regimen sliding scale   SubCutaneous at bedtime  remdesivir  IVPB 100 milliGRAM(s) IV Intermittent every 24 hours  remdesivir  IVPB   IV Intermittent     MEDICATIONS  (PRN):  acetaminophen     Tablet .. 650 milliGRAM(s) Oral every 6 hours PRN Temp greater or equal to 38C (100.4F), Moderate Pain (4 - 6)      Allergies    No Known Allergies    Intolerances        T(F): 97.5 (12-05-21 @ 05:55), Max: 97.5 (12-04-21 @ 12:51)  HR: 78 (12-05-21 @ 05:55) (73 - 87)  BP: 131/67 (12-05-21 @ 05:55) (105/70 - 131/67)  RR: 17 (12-05-21 @ 05:55) (17 - 18)  SpO2: 90% (12-05-21 @ 05:55) (90% - 91%)  Wt(kg): --    PHYSICAL EXAM:  GENERAL: NAD  HEAD:  Atraumatic, Normocephalic  EYES: PERRLA, conjunctiva and sclera clear  ENMT: Moist mucous membranes  NECK: Supple, No JVD, Normal thyroid  NERVOUS SYSTEM:  Alert & Awake  CHEST/LUNG: Clear to percussion bilaterally;   HEART: Regular rate and rhythm;   ABDOMEN: Soft, Nontender, Nondistended; Bowel sounds present  EXTREMITIES:  no edema BL LE  SKIN: moist    LABS:    12-05    146<H>  |  113<H>  |  43<H>  ----------------------------<  96  4.0   |  26  |  1.05    Ca    9.2      05 Dec 2021 06:45    TPro  6.4  /  Alb  2.4<L>  /  TBili  0.4  /  DBili  0.2  /  AST  89<H>  /  ALT  48  /  AlkPhos  78  12-05        Cultures;   CAPILLARY BLOOD GLUCOSE      POCT Blood Glucose.: 125 mg/dL (05 Dec 2021 07:45)  POCT Blood Glucose.: 183 mg/dL (04 Dec 2021 21:20)  POCT Blood Glucose.: 155 mg/dL (04 Dec 2021 16:01)  POCT Blood Glucose.: 123 mg/dL (04 Dec 2021 11:59)    Lipid panel:           RADIOLOGY & ADDITIONAL TESTS:    Imaging Personally Reviewed:  [x ] YES      Consultant(s) Notes Reviewed:  [x ] YES     Care Discussed with [x ] Consultants [X ] Patient [ ] Family  [x ]    [x ]  Other; RN

## 2021-12-06 LAB
ALBUMIN SERPL ELPH-MCNC: 2.6 G/DL — LOW (ref 3.3–5)
ALP SERPL-CCNC: 89 U/L — SIGNIFICANT CHANGE UP (ref 40–120)
ALT FLD-CCNC: 60 U/L — SIGNIFICANT CHANGE UP (ref 12–78)
ANION GAP SERPL CALC-SCNC: 9 MMOL/L — SIGNIFICANT CHANGE UP (ref 5–17)
AST SERPL-CCNC: 118 U/L — HIGH (ref 15–37)
BILIRUB DIRECT SERPL-MCNC: 0.3 MG/DL — SIGNIFICANT CHANGE UP (ref 0–0.3)
BILIRUB INDIRECT FLD-MCNC: 0.4 MG/DL — SIGNIFICANT CHANGE UP (ref 0.2–1)
BILIRUB SERPL-MCNC: 0.7 MG/DL — SIGNIFICANT CHANGE UP (ref 0.2–1.2)
BUN SERPL-MCNC: 31 MG/DL — HIGH (ref 7–23)
CALCIUM SERPL-MCNC: 9.3 MG/DL — SIGNIFICANT CHANGE UP (ref 8.5–10.1)
CHLORIDE SERPL-SCNC: 111 MMOL/L — HIGH (ref 96–108)
CO2 SERPL-SCNC: 25 MMOL/L — SIGNIFICANT CHANGE UP (ref 22–31)
CREAT SERPL-MCNC: 1.09 MG/DL — SIGNIFICANT CHANGE UP (ref 0.5–1.3)
FLUAV AG NPH QL: SIGNIFICANT CHANGE UP
FLUBV AG NPH QL: SIGNIFICANT CHANGE UP
GLUCOSE BLDC GLUCOMTR-MCNC: 108 MG/DL — HIGH (ref 70–99)
GLUCOSE BLDC GLUCOMTR-MCNC: 154 MG/DL — HIGH (ref 70–99)
GLUCOSE BLDC GLUCOMTR-MCNC: 173 MG/DL — HIGH (ref 70–99)
GLUCOSE BLDC GLUCOMTR-MCNC: 87 MG/DL — SIGNIFICANT CHANGE UP (ref 70–99)
GLUCOSE SERPL-MCNC: 86 MG/DL — SIGNIFICANT CHANGE UP (ref 70–99)
HCT VFR BLD CALC: 45.6 % — SIGNIFICANT CHANGE UP (ref 39–50)
HGB BLD-MCNC: 14.7 G/DL — SIGNIFICANT CHANGE UP (ref 13–17)
MCHC RBC-ENTMCNC: 29.3 PG — SIGNIFICANT CHANGE UP (ref 27–34)
MCHC RBC-ENTMCNC: 32.2 GM/DL — SIGNIFICANT CHANGE UP (ref 32–36)
MCV RBC AUTO: 91 FL — SIGNIFICANT CHANGE UP (ref 80–100)
NRBC # BLD: 0 /100 WBCS — SIGNIFICANT CHANGE UP (ref 0–0)
PLATELET # BLD AUTO: 168 K/UL — SIGNIFICANT CHANGE UP (ref 150–400)
POTASSIUM SERPL-MCNC: 3.6 MMOL/L — SIGNIFICANT CHANGE UP (ref 3.5–5.3)
POTASSIUM SERPL-SCNC: 3.6 MMOL/L — SIGNIFICANT CHANGE UP (ref 3.5–5.3)
PROT SERPL-MCNC: 6.8 GM/DL — SIGNIFICANT CHANGE UP (ref 6–8.3)
RBC # BLD: 5.01 M/UL — SIGNIFICANT CHANGE UP (ref 4.2–5.8)
RBC # FLD: 14.7 % — HIGH (ref 10.3–14.5)
SARS-COV-2 RNA SPEC QL NAA+PROBE: DETECTED
SODIUM SERPL-SCNC: 145 MMOL/L — SIGNIFICANT CHANGE UP (ref 135–145)
WBC # BLD: 5.24 K/UL — SIGNIFICANT CHANGE UP (ref 3.8–10.5)
WBC # FLD AUTO: 5.24 K/UL — SIGNIFICANT CHANGE UP (ref 3.8–10.5)

## 2021-12-06 PROCEDURE — 99232 SBSQ HOSP IP/OBS MODERATE 35: CPT

## 2021-12-06 RX ADMIN — Medication 1: at 12:12

## 2021-12-06 RX ADMIN — Medication 1: at 17:30

## 2021-12-06 RX ADMIN — Medication 6 MILLIGRAM(S): at 05:11

## 2021-12-06 RX ADMIN — ENOXAPARIN SODIUM 40 MILLIGRAM(S): 100 INJECTION SUBCUTANEOUS at 12:14

## 2021-12-06 RX ADMIN — CLOPIDOGREL BISULFATE 75 MILLIGRAM(S): 75 TABLET, FILM COATED ORAL at 12:13

## 2021-12-06 RX ADMIN — ATORVASTATIN CALCIUM 20 MILLIGRAM(S): 80 TABLET, FILM COATED ORAL at 22:41

## 2021-12-06 NOTE — PROGRESS NOTE ADULT - ASSESSMENT
85M with a PMH of dementia, CAD, HLD who presents to the ED for medical evaluation.    Patient had fever and initially thought to have low oxygen level though most room air documentation seems to be above 94%   today he was taken off oxygen and saturation remains consistently above 94%  fever has resolved   UA only mildly possible with 6-10 WBC and he denies any urinary symptoms  if not requiring oxygen he does not need RDV or dexamethasone  additionally I do not believe he has a UTI and his fever is more likely related to covid   would stop antibiotics, rdv and dex     11/30: no fevers since 11/25, remains on RA, no new lab work, blood cultures remain with no growth, UC grew E. Coli, pt denies any urinary symptoms at the moment, was started on ceftriaxone day #3 by the medical staff.   12/1: Pt with COVID 19 diagnosis, remains on RA, blood cultures with no growth to date, treatment for UTI complete, no role for any more antimicrobials at the moment.   12/2: having fevers today, now requires supplemental O2, started on remdesivir and decadron. Procalcitonin is low, CXR with worsening, personally reviewed, will obtain two sets of BC. Pt's symptoms most likely related to COVID 19. Discussed with pt's son, all questions answered to the best of my ability.   12/3: remains on nc, rdv and decadron, will try to wean off oxygen  12/6: doing well on RA, no objection to discharge to [facility], he has been positive for 11 days and not considered to be infectious at this time though he may test positive intermittently for the next 3 months. This should be prevent him from going to rehab or participation in PT.     Plan  #COVID  Monitor clinically.  Monitor Oxygenation  O2 supplementation only if necessary   Remdesivir - he has received 5 doses total during hospitalization and is off oxygen, no need for additional dosing   can stop Dexamethasone 6mg  Anticoagulation per protocol.  Monitor for any bacterial superinfection/complications.  This tx plan was formulated utilizing my clinical judgement, currently available local/regional anecdotal information, organizational treatment recommendations with COVID-19 specific considerations given rapidly changing information available.     dementia  -continue to orient   -make sure patient is calm  -watch for sundowning      D/W Dr. Alyssa Vinson, DO  Infectious Disease Attending  Pager [phone]  After 5pm/weekends please call [phone] for all inquiries and new consults

## 2021-12-06 NOTE — PROGRESS NOTE ADULT - SUBJECTIVE AND OBJECTIVE BOX
BERTHA HICKS  MRN-[id number]    Follow Up:  COVID 19    Interval History: The pt was seen and examined earlier today, sitting in chair off oxygen with O2 saturation above 94%, he has no complaints but is confused and limited ROS    PAST MEDICAL & SURGICAL HISTORY:  Hyperlipidemia        ROS:    [ ] Unobtainable because:  [x ] All other systems negative except as noted above though limited based on dementia             Allergies  No Known Allergies        ANTIMICROBIALS:    remdesivir  IVPB    remdesivir  IVPB 100 every 24 hours    RDV review:     remdesivir  IVPB   500 mL/Hr IV Intermittent (21 @ 14:55)    remdesivir  IVPB   200 milliGRAM(s) IV Intermittent (21 @ 06:53)    remdesivir  IVPB   500 mL/Hr IV Intermittent (21 @ 14:01)   500 mL/Hr IV Intermittent (21 @ 14:12)   500 mL/Hr IV Intermittent (21 @ 14:29      MEDICATIONS  (STANDING):  atorvastatin 20 at bedtime  clopidogrel Tablet 75 daily  dexAMETHasone  Injectable 6 daily  dextrose 40% Gel 15 once  dextrose 50% Injectable 25 once  dextrose 50% Injectable 12.5 once  dextrose 50% Injectable 25 once  enoxaparin Injectable 40 daily  glucagon  Injectable 1 once  insulin lispro (ADMELOG) corrective regimen sliding scale  three times a day before meals  insulin lispro (ADMELOG) corrective regimen sliding scale  at bedtime      PRN  acetaminophen     Tablet .. 650 milliGRAM(s) Oral every 6 hours PRN      Physical Exam:  Vital Signs Last 24 Hrs  T(F): 97.3 (21 @ 11:38), Max: 97.9 (21 @ 05:05)  HR: 99 (21 @ 11:38)  BP: 114/67 (21 @ 11:38)  RR: 17 (21 @ 11:38)  SpO2: 94% (21 @ 11:38) (90% - 94%)  Wt(kg): --    Physical Exam:  Constitutional: non-toxic, no distress, on RA   HEAD/EYES: anicteric, no conjunctival injection  ENT:  supple, no thrush  Cardiovascular:   normal S1, S2, no murmur, no edema  Respiratory:  grossly clear bilaterally, no wheezes, no rales  GI:  soft, non-tender, normal bowel sounds  :  no nelson, no CVA tenderness  Musculoskeletal:  no synovitis, normal ROM  Neurologic: awake and alert to person and birthday, not place or year   Skin:  no rash, no erythema, no phlebitis  Heme/Onc: no lymphadenopathy   Psychiatric:  awake, alert, appropriate mood    WBC Count: 5.24 K/uL ( @ 06:54)  WBC Count: 4.64 K/uL ( @ 11:27)  WBC Count: 3.69 K/uL ( @ 08:42)                            14.7   5.24  )-----------( 168      ( 06 Dec 2021 06:54 )             45.6           145  |  111<H>  |  31<H>  ----------------------------<  86  3.6   |  25  |  1.09    Ca    9.3      06 Dec 2021 06:54    TPro  6.8  /  Alb  2.6<L>  /  TBili  0.7  /  DBili  0.3  /  AST  118<H>  /  ALT  60  /  AlkPhos  89        Creatinine Trend: 1.09<--, 1.05<--, 1.18<--, 1.45<--, 1.42<--, 1.78<--        Lactate, Blood: 1.5 mmol/L (21 @ 11:27)      Urinalysis Basic - ( 02 Dec 2021 12:16 )    Color: Yellow / Appearance: Clear / S.020 / pH: x  Gluc: x / Ketone: Negative  / Bili: Negative / Urobili: Negative mg/dL   Blood: x / Protein: 500 mg/dL / Nitrite: Negative   Leuk Esterase: Negative / RBC: 11-25 /HPF / WBC x   Sq Epi: x / Non Sq Epi: Occasional / Bacteria: Few        MICROBIOLOGY:  v  .Blood Blood-Peripheral  21   No Growth Final  --  --      Clean Catch Clean Catch (Midstream)  21   50,000 - 99,000 CFU/mL Escherichia coli  --  Escherichia coli        D-Dimer Assay, Quantitative: 293 ()  D-Dimer Assay, Quantitative: 326 ()  D-Dimer Assay, Quantitative: 222 ()    Procalcitonin, Serum: 0.24 (21 @ 15:19)  Procalcitonin, Serum: 0.30 (21 @ 08:04)      RADIOLOGY:  no new imaging

## 2021-12-06 NOTE — PROGRESS NOTE ADULT - SUBJECTIVE AND OBJECTIVE BOX
Patient: BERTHA HICKS [id number] 85y Male                            Hospitalist Attending Note    No complaints.  Denies SOB.    No fevers noted.  SaO2 90% on RA.     ____________________PHYSICAL EXAM:  GENERAL:  NAD, alert, confused.   HEENT: NCAT  CARDIOVASCULAR:  S1, S2  LUNGS: CTAB  ABDOMEN:  soft, (-) tenderness, (-) distension, (+) bowel sounds, (-) guarding, (-) rebound (-) rigidity  EXTREMITIES:  no cyanosis / clubbing / edema.   ____________________     VITALS:  Vital Signs Last 24 Hrs  T(C): 36.3 (06 Dec 2021 11:38), Max: 36.6 (06 Dec 2021 05:05)  T(F): 97.3 (06 Dec 2021 11:38), Max: 97.9 (06 Dec 2021 05:05)  HR: 99 (06 Dec 2021 11:38) (84 - 99)  BP: 114/67 (06 Dec 2021 11:38) (114/67 - 129/82)  BP(mean): --  RR: 18 (06 Dec 2021 12:28) (17 - 18)  SpO2: 94% (06 Dec 2021 12:28) (90% - 94%) Daily     Daily   CAPILLARY BLOOD GLUCOSE      POCT Blood Glucose.: 154 mg/dL (06 Dec 2021 11:21)  POCT Blood Glucose.: 87 mg/dL (06 Dec 2021 07:58)  POCT Blood Glucose.: 89 mg/dL (05 Dec 2021 21:23)    I&O's Summary    05 Dec 2021 07:01  -  06 Dec 2021 07:00  --------------------------------------------------------  IN: 840 mL / OUT: 0 mL / NET: 840 mL        HISTORY:  PAST MEDICAL & SURGICAL HISTORY:  Hyperlipidemia    Allergies    No Known Allergies    Intolerances       LABS:                        14.7   5.24  )-----------( 168      ( 06 Dec 2021 06:54 )             45.6     12-06    145  |  111<H>  |  31<H>  ----------------------------<  86  3.6   |  25  |  1.09    Ca    9.3      06 Dec 2021 06:54    TPro  6.8  /  Alb  2.6<L>  /  TBili  0.7  /  DBili  0.3  /  AST  118<H>  /  ALT  60  /  AlkPhos  89  12-06      LIVER FUNCTIONS - ( 06 Dec 2021 06:54 )  Alb: 2.6 g/dL / Pro: 6.8 gm/dL / ALK PHOS: 89 U/L / ALT: 60 U/L / AST: 118 U/L / GGT: x                     MEDICATIONS:  MEDICATIONS  (STANDING):  atorvastatin 20 milliGRAM(s) Oral at bedtime  clopidogrel Tablet 75 milliGRAM(s) Oral daily  dexAMETHasone  Injectable 6 milliGRAM(s) IV Push daily  dextrose 40% Gel 15 Gram(s) Oral once  dextrose 5% + sodium chloride 0.45%. 1000 milliLiter(s) (60 mL/Hr) IV Continuous <Continuous>  dextrose 5%. 1000 milliLiter(s) (50 mL/Hr) IV Continuous <Continuous>  dextrose 5%. 1000 milliLiter(s) (100 mL/Hr) IV Continuous <Continuous>  dextrose 50% Injectable 25 Gram(s) IV Push once  dextrose 50% Injectable 12.5 Gram(s) IV Push once  dextrose 50% Injectable 25 Gram(s) IV Push once  enoxaparin Injectable 40 milliGRAM(s) SubCutaneous daily  glucagon  Injectable 1 milliGRAM(s) IntraMuscular once  insulin lispro (ADMELOG) corrective regimen sliding scale   SubCutaneous three times a day before meals  insulin lispro (ADMELOG) corrective regimen sliding scale   SubCutaneous at bedtime    MEDICATIONS  (PRN):  acetaminophen     Tablet .. 650 milliGRAM(s) Oral every 6 hours PRN Temp greater or equal to 38C (100.4F), Moderate Pain (4 - 6)

## 2021-12-06 NOTE — PROGRESS NOTE ADULT - ASSESSMENT
85 y o M with PMH of Dementia, CAD, DLD presented with generalized weakness, fever, myalgias.    # Acute hypoxic resp failure, COVID-19 PNA, Sleep apnea  - c/w supplemental O2  - s/p Remdesivir and decadron  - CXR . Scattered bilateral patchy opacities consistent with Covid 19 pneumonia in this clinical setting.   -Tylenol PRN fever  -isolation   - ID on board.  - Monitor CBC, CMP daily  - Ferritin, CRP, and D dimer q 48 hrs.  # Acute cystitis.  -culture growing E.coli. - s/p rocephin  # Dysphagia / Severe protein-calorie malnutrition-  Diet Soft and Bite Sized- Supplement Feeding Modality:  Oral  Ensure Enlive Cans or Servings Per Day:  1       Frequency:  Two Times a day aspiration precaution  # Back pain MR of thoracic/lumbar spine 1. Extensive degenerative changes with underlying thoracolumbar scoliosis. Multiple subluxations along with disc disease and hypertrophic changes of the posterior elements. 2. Severe foraminal narrowing at multiple levels, with moderate to severe central and recess narrowing in the mid and lower lumbar region. No intervention as per ortho. Pain controlled -PT evaluation - [facility]  # BERTHA  -Cr normalized.   # CAD s/p stents few years ago as per daughter -continue plavix, statin  # Hyperlipidemia.  -continue lipitor.  # Hypernatremia- s/p IVF, Na normalized.  # Dementia- supportive care.  DVT prop: Lovenox 40 daily.    Stable for [facility] placement.

## 2021-12-07 ENCOUNTER — TRANSCRIPTION ENCOUNTER (OUTPATIENT)
Age: 86
End: 2021-12-07

## 2021-12-07 LAB
ALBUMIN SERPL ELPH-MCNC: 2.3 G/DL — LOW (ref 3.3–5)
ALP SERPL-CCNC: 86 U/L — SIGNIFICANT CHANGE UP (ref 40–120)
ALT FLD-CCNC: 56 U/L — SIGNIFICANT CHANGE UP (ref 12–78)
AST SERPL-CCNC: 87 U/L — HIGH (ref 15–37)
BILIRUB DIRECT SERPL-MCNC: 0.3 MG/DL — SIGNIFICANT CHANGE UP (ref 0–0.3)
BILIRUB INDIRECT FLD-MCNC: 0.3 MG/DL — SIGNIFICANT CHANGE UP (ref 0.2–1)
BILIRUB SERPL-MCNC: 0.6 MG/DL — SIGNIFICANT CHANGE UP (ref 0.2–1.2)
CREAT SERPL-MCNC: 1.06 MG/DL — SIGNIFICANT CHANGE UP (ref 0.5–1.3)
CULTURE RESULTS: SIGNIFICANT CHANGE UP
CULTURE RESULTS: SIGNIFICANT CHANGE UP
GLUCOSE BLDC GLUCOMTR-MCNC: 101 MG/DL — HIGH (ref 70–99)
GLUCOSE BLDC GLUCOMTR-MCNC: 117 MG/DL — HIGH (ref 70–99)
GLUCOSE BLDC GLUCOMTR-MCNC: 221 MG/DL — HIGH (ref 70–99)
GLUCOSE BLDC GLUCOMTR-MCNC: 84 MG/DL — SIGNIFICANT CHANGE UP (ref 70–99)
PROT SERPL-MCNC: 6.3 GM/DL — SIGNIFICANT CHANGE UP (ref 6–8.3)
SPECIMEN SOURCE: SIGNIFICANT CHANGE UP
SPECIMEN SOURCE: SIGNIFICANT CHANGE UP

## 2021-12-07 PROCEDURE — 99232 SBSQ HOSP IP/OBS MODERATE 35: CPT

## 2021-12-07 RX ORDER — CLOPIDOGREL BISULFATE 75 MG/1
1 TABLET, FILM COATED ORAL
Qty: 0 | Refills: 0 | DISCHARGE
Start: 2021-12-07

## 2021-12-07 RX ORDER — ATORVASTATIN CALCIUM 80 MG/1
1 TABLET, FILM COATED ORAL
Qty: 0 | Refills: 0 | DISCHARGE
Start: 2021-12-07

## 2021-12-07 RX ORDER — ENOXAPARIN SODIUM 100 MG/ML
40 INJECTION SUBCUTANEOUS
Qty: 0 | Refills: 0 | DISCHARGE
Start: 2021-12-07

## 2021-12-07 RX ORDER — INSULIN LISPRO 100/ML
1 VIAL (ML) SUBCUTANEOUS
Qty: 0 | Refills: 0 | DISCHARGE
Start: 2021-12-07

## 2021-12-07 RX ORDER — DEXAMETHASONE 0.5 MG/5ML
6 ELIXIR ORAL ONCE
Refills: 0 | Status: COMPLETED | OUTPATIENT
Start: 2021-12-07 | End: 2021-12-07

## 2021-12-07 RX ADMIN — Medication 6 MILLIGRAM(S): at 06:15

## 2021-12-07 NOTE — CHART NOTE - NSCHARTNOTEFT_GEN_A_CORE
Nutrition follow-up note:    Pt adm w/COVID, UTI. PMHx includes dementia, CAD, HLD. Per chart pt is confused, disoriented and, at times, uncooperative w/care & combative. Pt is fed by staff and noted w/variable PO intake. Noted elevated POCT Glucose, pt is receiving Dexamethasone. Per chart pt awaiting transfer to [facility] pending insurance authorization.     Factors impacting intake: [ ] none [ ] nausea  [ ] vomiting [ ] diarrhea [ ] constipation  [ ]chewing problems [ ] swallowing issues  [x ] other: AMS, difficulty feeding self.    Diet Prescription: Soft & Bite-sized + Ensure Enlive x 2/day (provides 700 kcal, 40 g protein).  Intake: 0-50% intake of meals and PO supplement w/total feeding assistance.     Current Weight: (12/7) 64.6 kg, (11/30) 67.4 kg, (11/26 adm) 70 kg.  % Weight Change: 4% loss (2.8 kg) x 1 week.  No edema documented.  Unable to conduct nutrition-focused physical exam due to pt on COVID isolation.     Pertinent Medications: MEDICATIONS  (STANDING):  atorvastatin 20 milliGRAM(s) Oral at bedtime  clopidogrel Tablet 75 milliGRAM(s) Oral daily  dexAMETHasone  Injectable 6 milliGRAM(s) IV Push daily  dextrose 40% Gel 15 Gram(s) Oral once  dextrose 5% + sodium chloride 0.45%. 1000 milliLiter(s) (60 mL/Hr) IV Continuous <Continuous>  dextrose 5%. 1000 milliLiter(s) (50 mL/Hr) IV Continuous <Continuous>  dextrose 5%. 1000 milliLiter(s) (100 mL/Hr) IV Continuous <Continuous>  dextrose 50% Injectable 25 Gram(s) IV Push once  dextrose 50% Injectable 12.5 Gram(s) IV Push once  dextrose 50% Injectable 25 Gram(s) IV Push once  enoxaparin Injectable 40 milliGRAM(s) SubCutaneous daily  glucagon  Injectable 1 milliGRAM(s) IntraMuscular once  insulin lispro (ADMELOG) corrective regimen sliding scale   SubCutaneous three times a day before meals  insulin lispro (ADMELOG) corrective regimen sliding scale   SubCutaneous at bedtime    MEDICATIONS  (PRN):  acetaminophen     Tablet .. 650 milliGRAM(s) Oral every 6 hours PRN Temp greater or equal to 38C (100.4F), Moderate Pain (4 - 6)    Pertinent Labs: 12-07 Na--    Glu --    K+ --    Cr  1.06 mg/dL BUN --    12-07 Alb 2.3 g/dL<L>    11-27-21 @ 10:34A1C 5.8    Skin: no pressure ulcers.    Estimated Needs:   [x ] no change since previous assessment (12/3).  [ ] recalculated:     Previous Nutrition Diagnosis:   Malnutrition Severe Malnutrition in the context of Acute Illness.     Etiology Inadequate energy/protein intake ; Increased energy protein needs related to Covid PNA.     Signs/Symptoms > 2% wt loss in one week ; < 50 % nutrition needs > 5 days.     Goal/Expected Outcome Pt will Meet calorie and protein needs > 50% via  meals / supplements (GOAL not met consistently).    Nutrition Diagnosis is [x ] ongoing  [ ] resolved [ ] not applicable     New Nutrition Diagnosis: [x ] not applicable      Interventions:   Recommend:  [x] Continue current diet & PO supplement.  [ ] Change Diet To:  [ ] Nutrition Supplement  [ ] Nutrition Support  [ ] Other:     Monitoring and Evaluation:   [x ] PO intake [ x ] Tolerance to diet prescription [ x ] weights [ x ] labs[ x ] follow up per protocol  [ ] other:

## 2021-12-07 NOTE — DISCHARGE NOTE PROVIDER - NSDCFUADDAPPT_GEN_ALL_CORE_FT
During your hospitalization, you had abnormal findings on: chest xray ( Scattered bilateral patchy opacities).  Please see your primary doctor for followup of these findings to ensure that they have resolved.  You may require further evaluation to exclude certain serious conditions.

## 2021-12-07 NOTE — DISCHARGE NOTE PROVIDER - NSDCCPCAREPLAN_GEN_ALL_CORE_FT
PRINCIPAL DISCHARGE DIAGNOSIS  Diagnosis: Acute respiratory failure due to COVID-19  Assessment and Plan of Treatment:       SECONDARY DISCHARGE DIAGNOSES  Diagnosis: Pneumonia due to COVID-19 virus  Assessment and Plan of Treatment:     Diagnosis: Acute respiratory failure with hypoxia  Assessment and Plan of Treatment:     Diagnosis: Acute UTI  Assessment and Plan of Treatment:     Diagnosis: COVID-19 virus infection  Assessment and Plan of Treatment:

## 2021-12-07 NOTE — DISCHARGE NOTE PROVIDER - NSDCFUADDINST_GEN_ALL_CORE_FT
It is important to see your primary physician as well as the physicians noted below within the next week to perform a comprehensive medical review.  Call their offices for an appointment as soon as you leave the hospital.  You will also need to see them for renewal of your medications.  If you do not have a primary physician, contact the [facility] Physician Referral Service at [phone].  To obtain your results, you can access the [email or web address] Patient Portal at [email or web address].  Your medical issues appear to be stable at this time, but if your symptoms recur or worsen, contact your physicians and/or return to the hospital if necessary.  If you encounter any issues or questions with your medication, call your physicians before stopping the medication.  Do not drive.  Limit your diet to 2 grams of sodium daily.

## 2021-12-07 NOTE — PROGRESS NOTE ADULT - ASSESSMENT
85 y o M with PMH of Dementia, CAD, DLD presented with generalized weakness, fever, myalgias.    # Acute hypoxic resp failure, COVID-19 PNA, Sleep apnea  - c/w supplemental O2  - s/p Remdesivir and decadron  - CXR . Scattered bilateral patchy opacities consistent with Covid 19 pneumonia in this clinical setting.   -Tylenol PRN fever  -isolation   - ID on board.  - Monitor CBC, CMP daily  - Ferritin, CRP, and D dimer q 48 hrs.  # Acute cystitis.  -culture growing E.coli. - s/p rocephin  # Dysphagia / Severe protein-calorie malnutrition-  Diet Soft and Bite Sized- Supplement Feeding Modality:  Oral  Ensure Enlive Cans or Servings Per Day:  1       Frequency:  Two Times a day aspiration precaution  # Back pain MR of thoracic/lumbar spine 1. Extensive degenerative changes with underlying thoracolumbar scoliosis. Multiple subluxations along with disc disease and hypertrophic changes of the posterior elements. 2. Severe foraminal narrowing at multiple levels, with moderate to severe central and recess narrowing in the mid and lower lumbar region. No intervention as per ortho. Pain controlled -PT evaluation - [facility]  # BERTHA  -Cr normalized.   # CAD s/p stents few years ago as per daughter -continue plavix, statin  # Hyperlipidemia.  -continue lipitor.  # Hypernatremia- s/p IVF, Na normalized.  # Dementia- supportive care.  DVT prop: Lovenox 40 daily.    Stable for [facility] placement.   Plan of care discussed with daughter [phone]

## 2021-12-07 NOTE — DISCHARGE NOTE PROVIDER - HOSPITAL COURSE
85 y o M with PMH of Dementia, CAD, DLD presented with generalized weakness, fever, myalgias.    # Acute hypoxic resp failure, COVID-19 PNA, Sleep apnea  - c/w supplemental O2  - s/p Remdesivir and decadron  # Acute cystitis.  -culture growing E.coli. - s/p rocephin  # Dysphagia / Severe protein-calorie malnutrition-  Diet Soft and Bite Sized- Supplement Feeding Modality:  Oral  Ensure Enlive Cans or Servings Per Day:  1       Frequency:  Two Times a day aspiration precaution  # Back pain MR of thoracic/lumbar spine 1. Extensive degenerative changes with underlying thoracolumbar scoliosis. Multiple subluxations along with disc disease and hypertrophic changes of the posterior elements. 2. Severe foraminal narrowing at multiple levels, with moderate to severe central and recess narrowing in the mid and lower lumbar region. No intervention as per ortho. Pain controlled -PT evaluation - [facility]  # BERTHA  -Cr normalized.   # CAD s/p stents few years ago as per daughter -continue plavix, statin  # Hyperlipidemia.  -continue lipitor.  # Hypernatremia- s/p IVF, Na normalized.  # Dementia- supportive care.  DVT prop: Lovenox 40 daily.    Stable for [facility] placement.   Plan of care discussed with daughter [phone] 85 y o M with PMH of Dementia, CAD, DLD presented with generalized weakness, fever, myalgias.  Admitted for COVID infection, s/p Remdesivir and Decadron.  Symptoms improved, now on RA.  Remains slightly confused with poor po intake, improves with encouragement at bedside.  S/p IVF for BERTHA / Hypernatremia.      # BERTHA, Hypernatremia - pt with poor po intake.  Encourage po fluids, nutrition.  s/p IVF.  Now normalized.    # Hypoglycemia - Resolved  Encourage po intake.   # Acute hypoxic resp failure, COVID-19 PNA, Sleep apnea - s/p Remdesivir and decadron, now on RA.  Outpatient COVID vaccination was d/w pt's daughter.    # Metabolic Encephalopathy / Dementia - due to above.  Some improvement.  Supportive care.    # Acute cystitis.  -culture growing E.coli. - s/p rocephin  # Dysphagia / Severe protein-calorie malnutrition-  Diet Soft and Bite Sized- Supplement Feeding Modality:  Oral  Ensure Enlive Cans or Servings Per Day:  1 Frequency:  Two Times a day aspiration precaution  # Back pain MR of thoracic/lumbar spine 1. Extensive degenerative changes with underlying thoracolumbar scoliosis. Multiple subluxations along with disc disease and hypertrophic changes of the posterior elements. 2. Severe foraminal narrowing at multiple levels, with moderate to severe central and recess narrowing in the mid and lower lumbar region. No intervention as per ortho. Pain controlled -PT followup   # CAD s/p stents few years ago as per daughter -continue plavix, statin  # Hyperlipidemia.  -continue lipitor.  DVT prop: Lovenox 40 daily.    Plan of care discussed with daughter Enid at [phone] on 12/7, 12/8, 12/11  Stable for [facility] placement.     Disposition: Stable for discharge.  Outpatient followup discussed.  Total time spent on discharge is  45 minutes.

## 2021-12-07 NOTE — DISCHARGE NOTE PROVIDER - NSDCMRMEDTOKEN_GEN_ALL_CORE_FT
atorvastatin 20 mg oral tablet: 1 tab(s) orally once a day (at bedtime)  clopidogrel 75 mg oral tablet: 1 tab(s) orally once a day  enoxaparin: 40 milligram(s) subcutaneous once a day for 30days.   insulin lispro 100 units/mL injectable solution: 1 dose(s) injectable 4 times a day (before meals and at bedtime)  For FS:   151-200 give 2 units subcut  201-250 give 4 units subcut  251-300 give 6 units subcut  301-350 give 8 units subcut  351-400 give 10 units subcut  &gt; 400 give 12 units subcut.    atorvastatin 20 mg oral tablet: 1 tab(s) orally once a day (at bedtime)  clopidogrel 75 mg oral tablet: 1 tab(s) orally once a day  enoxaparin: 40 milligram(s) subcutaneous once a day until 1/7/2022  insulin lispro 100 units/mL injectable solution: 1 dose(s) injectable 4 times a day (before meals and at bedtime)  For FS:   151-200 give 2 units subcut  201-250 give 4 units subcut  251-300 give 6 units subcut  301-350 give 8 units subcut  351-400 give 10 units subcut  &gt; 400 give 12 units subcut.

## 2021-12-07 NOTE — PROGRESS NOTE ADULT - SUBJECTIVE AND OBJECTIVE BOX
Patient: BERTHA HICKS [id number] 85y Male                            Hospitalist Attending Note    No complaints.  Denies SOB.    No fevers noted.  SaO2 94% on RA.     ____________________PHYSICAL EXAM:  GENERAL:  NAD, alert, confused.   HEENT: NCAT  CARDIOVASCULAR:  S1, S2  LUNGS: CTAB  ABDOMEN:  soft, (-) tenderness, (-) distension, (+) bowel sounds, (-) guarding, (-) rebound (-) rigidity  EXTREMITIES:  no cyanosis / clubbing / edema.   ____________________    VITALS:  Vital Signs Last 24 Hrs  T(C): 36.4 (07 Dec 2021 05:25), Max: 36.4 (07 Dec 2021 05:25)  T(F): 97.6 (07 Dec 2021 05:25), Max: 97.6 (07 Dec 2021 05:25)  HR: 95 (07 Dec 2021 05:25) (91 - 95)  BP: 117/59 (07 Dec 2021 05:25) (117/59 - 124/85)  BP(mean): --  RR: 18 (07 Dec 2021 05:25) (17 - 18)  SpO2: 94% (07 Dec 2021 05:25) (94% - 94%) Daily     Daily Weight in k.6 (07 Dec 2021 05:25)  CAPILLARY BLOOD GLUCOSE      POCT Blood Glucose.: 117 mg/dL (07 Dec 2021 12:40)  POCT Blood Glucose.: 84 mg/dL (07 Dec 2021 09:20)  POCT Blood Glucose.: 108 mg/dL (06 Dec 2021 22:46)  POCT Blood Glucose.: 173 mg/dL (06 Dec 2021 16:41)    I&O's Summary    06 Dec 2021 07:01  -  07 Dec 2021 07:00  --------------------------------------------------------  IN: 240 mL / OUT: 0 mL / NET: 240 mL        LABS:                        14.7   5.24  )-----------( 168      ( 06 Dec 2021 06:54 )             45.6     12-07    x   |  x   |  x   ----------------------------<  x   x    |  x   |  1.06    Ca    9.3      06 Dec 2021 06:54    TPro  6.3  /  Alb  2.3<L>  /  TBili  0.6  /  DBili  0.3  /  AST  87<H>  /  ALT  56  /  AlkPhos  86  12      LIVER FUNCTIONS - ( 07 Dec 2021 10:24 )  Alb: 2.3 g/dL / Pro: 6.3 gm/dL / ALK PHOS: 86 U/L / ALT: 56 U/L / AST: 87 U/L / GGT: x                     MEDICATIONS:  acetaminophen     Tablet .. 650 milliGRAM(s) Oral every 6 hours PRN  atorvastatin 20 milliGRAM(s) Oral at bedtime  clopidogrel Tablet 75 milliGRAM(s) Oral daily  dexAMETHasone  Injectable 6 milliGRAM(s) IV Push daily  dextrose 40% Gel 15 Gram(s) Oral once  dextrose 5% + sodium chloride 0.45%. 1000 milliLiter(s) IV Continuous <Continuous>  dextrose 5%. 1000 milliLiter(s) IV Continuous <Continuous>  dextrose 5%. 1000 milliLiter(s) IV Continuous <Continuous>  dextrose 50% Injectable 25 Gram(s) IV Push once  dextrose 50% Injectable 12.5 Gram(s) IV Push once  dextrose 50% Injectable 25 Gram(s) IV Push once  enoxaparin Injectable 40 milliGRAM(s) SubCutaneous daily  glucagon  Injectable 1 milliGRAM(s) IntraMuscular once  insulin lispro (ADMELOG) corrective regimen sliding scale   SubCutaneous three times a day before meals  insulin lispro (ADMELOG) corrective regimen sliding scale   SubCutaneous at bedtime

## 2021-12-08 LAB
ALBUMIN SERPL ELPH-MCNC: 2.8 G/DL — LOW (ref 3.3–5)
ALP SERPL-CCNC: 89 U/L — SIGNIFICANT CHANGE UP (ref 40–120)
ALT FLD-CCNC: 54 U/L — SIGNIFICANT CHANGE UP (ref 12–78)
ANION GAP SERPL CALC-SCNC: 6 MMOL/L — SIGNIFICANT CHANGE UP (ref 5–17)
AST SERPL-CCNC: 75 U/L — HIGH (ref 15–37)
BILIRUB DIRECT SERPL-MCNC: 0.3 MG/DL — SIGNIFICANT CHANGE UP (ref 0–0.3)
BILIRUB SERPL-MCNC: 0.9 MG/DL — SIGNIFICANT CHANGE UP (ref 0.2–1.2)
BUN SERPL-MCNC: 42 MG/DL — HIGH (ref 7–23)
CALCIUM SERPL-MCNC: 9.4 MG/DL — SIGNIFICANT CHANGE UP (ref 8.5–10.1)
CHLORIDE SERPL-SCNC: 116 MMOL/L — HIGH (ref 96–108)
CO2 SERPL-SCNC: 29 MMOL/L — SIGNIFICANT CHANGE UP (ref 22–31)
CREAT SERPL-MCNC: 1.36 MG/DL — HIGH (ref 0.5–1.3)
CRP SERPL-MCNC: 61 MG/L — HIGH
D DIMER BLD IA.RAPID-MCNC: 401 NG/ML DDU — HIGH
FERRITIN SERPL-MCNC: 1580 NG/ML — HIGH (ref 30–400)
GLUCOSE BLDC GLUCOMTR-MCNC: 148 MG/DL — HIGH (ref 70–99)
GLUCOSE BLDC GLUCOMTR-MCNC: 74 MG/DL — SIGNIFICANT CHANGE UP (ref 70–99)
GLUCOSE BLDC GLUCOMTR-MCNC: 89 MG/DL — SIGNIFICANT CHANGE UP (ref 70–99)
GLUCOSE SERPL-MCNC: 106 MG/DL — HIGH (ref 70–99)
HCT VFR BLD CALC: 46.6 % — SIGNIFICANT CHANGE UP (ref 39–50)
HGB BLD-MCNC: 15 G/DL — SIGNIFICANT CHANGE UP (ref 13–17)
MCHC RBC-ENTMCNC: 29.6 PG — SIGNIFICANT CHANGE UP (ref 27–34)
MCHC RBC-ENTMCNC: 32.2 GM/DL — SIGNIFICANT CHANGE UP (ref 32–36)
MCV RBC AUTO: 92.1 FL — SIGNIFICANT CHANGE UP (ref 80–100)
NRBC # BLD: 0 /100 WBCS — SIGNIFICANT CHANGE UP (ref 0–0)
PLATELET # BLD AUTO: 257 K/UL — SIGNIFICANT CHANGE UP (ref 150–400)
POTASSIUM SERPL-MCNC: 4.7 MMOL/L — SIGNIFICANT CHANGE UP (ref 3.5–5.3)
POTASSIUM SERPL-SCNC: 4.7 MMOL/L — SIGNIFICANT CHANGE UP (ref 3.5–5.3)
PROT SERPL-MCNC: 7.3 GM/DL — SIGNIFICANT CHANGE UP (ref 6–8.3)
RBC # BLD: 5.06 M/UL — SIGNIFICANT CHANGE UP (ref 4.2–5.8)
RBC # FLD: 15.1 % — HIGH (ref 10.3–14.5)
SODIUM SERPL-SCNC: 151 MMOL/L — HIGH (ref 135–145)
WBC # BLD: 8.23 K/UL — SIGNIFICANT CHANGE UP (ref 3.8–10.5)
WBC # FLD AUTO: 8.23 K/UL — SIGNIFICANT CHANGE UP (ref 3.8–10.5)

## 2021-12-08 PROCEDURE — 99233 SBSQ HOSP IP/OBS HIGH 50: CPT

## 2021-12-08 RX ORDER — SODIUM CHLORIDE 9 MG/ML
1000 INJECTION, SOLUTION INTRAVENOUS
Refills: 0 | Status: DISCONTINUED | OUTPATIENT
Start: 2021-12-08 | End: 2021-12-09

## 2021-12-08 RX ADMIN — ATORVASTATIN CALCIUM 20 MILLIGRAM(S): 80 TABLET, FILM COATED ORAL at 22:05

## 2021-12-08 RX ADMIN — SODIUM CHLORIDE 125 MILLILITER(S): 9 INJECTION, SOLUTION INTRAVENOUS at 18:43

## 2021-12-08 NOTE — PROGRESS NOTE ADULT - SUBJECTIVE AND OBJECTIVE BOX
[facility] NEPHROLOGY SERVICES, [facility]  NEPHROLOGY AND HYPERTENSION  [address]  [address]  [place] [address]  [phone]    MD SHEFALI PORTER MD ANDREY GONCHARUK, MD MADHU KORRAPATI, MD YELENA ROSENBERG, MD BINNY KOSHY, MD CHRISTOPHER CAPUTO, MD GERALDINE JOHNSON MD          Patient events noted  not eating or drinking   no iv lock    MEDICATIONS  (STANDING):  atorvastatin 20 milliGRAM(s) Oral at bedtime  clopidogrel Tablet 75 milliGRAM(s) Oral daily  dexAMETHasone  Injectable 6 milliGRAM(s) IV Push daily  dextrose 40% Gel 15 Gram(s) Oral once  dextrose 5%. 1000 milliLiter(s) (100 mL/Hr) IV Continuous <Continuous>  dextrose 5%. 1000 milliLiter(s) (50 mL/Hr) IV Continuous <Continuous>  dextrose 50% Injectable 25 Gram(s) IV Push once  dextrose 50% Injectable 12.5 Gram(s) IV Push once  dextrose 50% Injectable 25 Gram(s) IV Push once  enoxaparin Injectable 40 milliGRAM(s) SubCutaneous daily  glucagon  Injectable 1 milliGRAM(s) IntraMuscular once  insulin lispro (ADMELOG) corrective regimen sliding scale   SubCutaneous three times a day before meals  insulin lispro (ADMELOG) corrective regimen sliding scale   SubCutaneous at bedtime  sodium chloride 0.45%. 1000 milliLiter(s) (125 mL/Hr) IV Continuous <Continuous>    MEDICATIONS  (PRN):  acetaminophen     Tablet .. 650 milliGRAM(s) Oral every 6 hours PRN Temp greater or equal to 38C (100.4F), Moderate Pain (4 - 6)      PHYSICAL EXAM:      T(C): 36.6 (12-08-21 @ 17:08), Max: 36.6 (12-08-21 @ 17:08)  HR: 89 (12-08-21 @ 17:08) (85 - 97)  BP: 126/69 (12-08-21 @ 17:08) (120/75 - 136/68)  RR: 17 (12-08-21 @ 17:08) (17 - 19)  SpO2: 95% (12-08-21 @ 17:08) (92% - 95%)  Wt(kg): --  Lungs clear  Heart S1S2  Abd soft NT ND  Extremities:   1 edema                                    15.0   8.23  )-----------( 257      ( 08 Dec 2021 07:24 )             46.6     12-08    151<H>  |  116<H>  |  42<H>  ----------------------------<  106<H>  4.7   |  29  |  1.36<H>    Ca    9.4      08 Dec 2021 07:24    TPro  7.3  /  Alb  2.8<L>  /  TBili  0.9  /  DBili  0.3  /  AST  75<H>  /  ALT  54  /  AlkPhos  89  12-08      LIVER FUNCTIONS - ( 08 Dec 2021 07:24 )  Alb: 2.8 g/dL / Pro: 7.3 gm/dL / ALK PHOS: 89 U/L / ALT: 54 U/L / AST: 75 U/L / GGT: x           Creatinine Trend: 1.36<--, 1.06<--, 1.09<--, 1.05<--, 1.18<--, 1.45<--      Assessment   BERTHA hypernatremia; pre renal azotemia     Plan:  IVF if IVL available;   Encourage po fluid intake     Bridger Hollins MD

## 2021-12-08 NOTE — PROGRESS NOTE ADULT - SUBJECTIVE AND OBJECTIVE BOX
Patient: BERTHA HICKS [id number] 85y Male                            Hospitalist Attending Note    No complaints.  Denies SOB / fevers  Taking po intake.  Participating with efforts OOB.   SaO2 94% on RA.     ____________________PHYSICAL EXAM:  GENERAL:  NAD, alert, confused.   HEENT: NCAT  CARDIOVASCULAR:  S1, S2  LUNGS: CTAB  ABDOMEN:  soft, (-) tenderness, (-) distension, (+) bowel sounds, (-) guarding, (-) rebound (-) rigidity  EXTREMITIES:  no cyanosis / clubbing / edema.   ____________________    VITALS:  Vital Signs Last 24 Hrs  T(C): 36.3 (08 Dec 2021 11:05), Max: 36.4 (07 Dec 2021 17:05)  T(F): 97.4 (08 Dec 2021 11:05), Max: 97.5 (07 Dec 2021 17:05)  HR: 85 (08 Dec 2021 11:05) (85 - 97)  BP: 136/68 (08 Dec 2021 11:05) (111/65 - 136/68)  BP(mean): --  RR: 18 (08 Dec 2021 11:05) (17 - 19)  SpO2: 94% (08 Dec 2021 11:05) (92% - 95%) Daily     Daily   CAPILLARY BLOOD GLUCOSE      POCT Blood Glucose.: 148 mg/dL (08 Dec 2021 12:04)  POCT Blood Glucose.: 89 mg/dL (08 Dec 2021 07:33)  POCT Blood Glucose.: 221 mg/dL (07 Dec 2021 21:35)  POCT Blood Glucose.: 101 mg/dL (07 Dec 2021 17:07)    I&O's Summary      LABS:                        15.0   8.23  )-----------( 257      ( 08 Dec 2021 07:24 )             46.6     12-08    151<H>  |  116<H>  |  42<H>  ----------------------------<  106<H>  4.7   |  29  |  1.36<H>    Ca    9.4      08 Dec 2021 07:24    TPro  7.3  /  Alb  2.8<L>  /  TBili  0.9  /  DBili  0.3  /  AST  75<H>  /  ALT  54  /  AlkPhos  89  12-08      LIVER FUNCTIONS - ( 08 Dec 2021 07:24 )  Alb: 2.8 g/dL / Pro: 7.3 gm/dL / ALK PHOS: 89 U/L / ALT: 54 U/L / AST: 75 U/L / GGT: x                     MEDICATIONS:  acetaminophen     Tablet .. 650 milliGRAM(s) Oral every 6 hours PRN  atorvastatin 20 milliGRAM(s) Oral at bedtime  clopidogrel Tablet 75 milliGRAM(s) Oral daily  dexAMETHasone  Injectable 6 milliGRAM(s) IV Push daily  dextrose 40% Gel 15 Gram(s) Oral once  dextrose 5% + sodium chloride 0.45%. 1000 milliLiter(s) IV Continuous <Continuous>  dextrose 5%. 1000 milliLiter(s) IV Continuous <Continuous>  dextrose 5%. 1000 milliLiter(s) IV Continuous <Continuous>  dextrose 50% Injectable 25 Gram(s) IV Push once  dextrose 50% Injectable 12.5 Gram(s) IV Push once  dextrose 50% Injectable 25 Gram(s) IV Push once  enoxaparin Injectable 40 milliGRAM(s) SubCutaneous daily  glucagon  Injectable 1 milliGRAM(s) IntraMuscular once  insulin lispro (ADMELOG) corrective regimen sliding scale   SubCutaneous three times a day before meals  insulin lispro (ADMELOG) corrective regimen sliding scale   SubCutaneous at bedtime

## 2021-12-08 NOTE — CHART NOTE - NSCHARTNOTEFT_GEN_A_CORE
I contacted pt's daughter and son Michael.  Pt agitated at times, refusing IV.  He is not able to demonstrate a clear understanding of its need.    Requires restraints for safety and to maintain IV access.    Discussed with daughter and son, in agreement. I contacted pt's daughters Enid and Trupti and son Michael.  Pt agitated at times, refusing IV.  He is not able to demonstrate a clear understanding of its need.    Requires restraints for safety and to maintain IV access.    Discussed with daughters and son, in agreement.

## 2021-12-08 NOTE — PROGRESS NOTE ADULT - ASSESSMENT
85 y o M with PMH of Dementia, CAD, DLD presented with generalized weakness, fever, myalgias.    # BERTHA, Hypernatremia - pt with poor po intake.  Encourage po fluids, nutrition.  IVF.  Repeat BMP in am.   # Acute hypoxic resp failure, COVID-19 PNA, Sleep apnea - s/p Remdesivir and decadron, now on RA.  Outpatient COVID vaccination was d/w pt's daughter.    # Acute cystitis.  -culture growing E.coli. - s/p rocephin  # Dysphagia / Severe protein-calorie malnutrition-  Diet Soft and Bite Sized- Supplement Feeding Modality:  Oral  Ensure Enlive Cans or Servings Per Day:  1 Frequency:  Two Times a day aspiration precaution  # Back pain MR of thoracic/lumbar spine 1. Extensive degenerative changes with underlying thoracolumbar scoliosis. Multiple subluxations along with disc disease and hypertrophic changes of the posterior elements. 2. Severe foraminal narrowing at multiple levels, with moderate to severe central and recess narrowing in the mid and lower lumbar region. No intervention as per ortho. Pain controlled -PT evaluation - [facility]  # CAD s/p stents few years ago as per daughter -continue plavix, statin  # Hyperlipidemia.  -continue lipitor.  # Dementia- supportive care.  DVT prop: Lovenox 40 daily.    Hold discharge until 12/9, due to hypernatremia.    Plan of care discussed with daughter Enid at [phone] on 12/7

## 2021-12-09 LAB
ANION GAP SERPL CALC-SCNC: 7 MMOL/L — SIGNIFICANT CHANGE UP (ref 5–17)
BUN SERPL-MCNC: 29 MG/DL — HIGH (ref 7–23)
CALCIUM SERPL-MCNC: 9.4 MG/DL — SIGNIFICANT CHANGE UP (ref 8.5–10.1)
CHLORIDE SERPL-SCNC: 113 MMOL/L — HIGH (ref 96–108)
CO2 SERPL-SCNC: 27 MMOL/L — SIGNIFICANT CHANGE UP (ref 22–31)
CREAT SERPL-MCNC: 1.04 MG/DL — SIGNIFICANT CHANGE UP (ref 0.5–1.3)
CREAT SERPL-MCNC: 1.04 MG/DL — SIGNIFICANT CHANGE UP (ref 0.5–1.3)
GLUCOSE BLDC GLUCOMTR-MCNC: 106 MG/DL — HIGH (ref 70–99)
GLUCOSE BLDC GLUCOMTR-MCNC: 116 MG/DL — HIGH (ref 70–99)
GLUCOSE BLDC GLUCOMTR-MCNC: 51 MG/DL — CRITICAL LOW (ref 70–99)
GLUCOSE BLDC GLUCOMTR-MCNC: 71 MG/DL — SIGNIFICANT CHANGE UP (ref 70–99)
GLUCOSE BLDC GLUCOMTR-MCNC: 75 MG/DL — SIGNIFICANT CHANGE UP (ref 70–99)
GLUCOSE SERPL-MCNC: 68 MG/DL — LOW (ref 70–99)
POTASSIUM SERPL-MCNC: 3.9 MMOL/L — SIGNIFICANT CHANGE UP (ref 3.5–5.3)
POTASSIUM SERPL-SCNC: 3.9 MMOL/L — SIGNIFICANT CHANGE UP (ref 3.5–5.3)
SODIUM SERPL-SCNC: 147 MMOL/L — HIGH (ref 135–145)

## 2021-12-09 PROCEDURE — 99233 SBSQ HOSP IP/OBS HIGH 50: CPT

## 2021-12-09 RX ORDER — SODIUM CHLORIDE 9 MG/ML
1000 INJECTION, SOLUTION INTRAVENOUS
Refills: 0 | Status: DISCONTINUED | OUTPATIENT
Start: 2021-12-09 | End: 2021-12-10

## 2021-12-09 RX ADMIN — CLOPIDOGREL BISULFATE 75 MILLIGRAM(S): 75 TABLET, FILM COATED ORAL at 11:59

## 2021-12-09 RX ADMIN — Medication 6 MILLIGRAM(S): at 05:37

## 2021-12-09 RX ADMIN — SODIUM CHLORIDE 125 MILLILITER(S): 9 INJECTION, SOLUTION INTRAVENOUS at 12:29

## 2021-12-09 RX ADMIN — ENOXAPARIN SODIUM 40 MILLIGRAM(S): 100 INJECTION SUBCUTANEOUS at 11:59

## 2021-12-09 RX ADMIN — ATORVASTATIN CALCIUM 20 MILLIGRAM(S): 80 TABLET, FILM COATED ORAL at 22:09

## 2021-12-09 NOTE — PROGRESS NOTE ADULT - SUBJECTIVE AND OBJECTIVE BOX
Patient: BERTHA HICKS [id number] 85y Male                            Hospitalist Attending Note    Per aide at bedside, pt taking po.  Took 1 entire bottle of Glucerna and 8oz H20.   Denies complaints.     ____________________PHYSICAL EXAM:  GENERAL:  NAD, alert, confused.   HEENT: NCAT  CARDIOVASCULAR:  S1, S2  LUNGS: CTAB  ABDOMEN:  soft, (-) tenderness, (-) distension, (+) bowel sounds, (-) guarding, (-) rebound (-) rigidity  EXTREMITIES:  no cyanosis / clubbing / edema.   ____________________    VITALS:  Vital Signs Last 24 Hrs  T(C): 37.1 (09 Dec 2021 11:30), Max: 37.1 (09 Dec 2021 11:30)  T(F): 98.8 (09 Dec 2021 11:30), Max: 98.8 (09 Dec 2021 11:30)  HR: 92 (09 Dec 2021 11:30) (89 - 92)  BP: 134/81 (09 Dec 2021 11:30) (125/70 - 134/81)  BP(mean): --  RR: 19 (09 Dec 2021 11:30) (17 - 19)  SpO2: 93% (09 Dec 2021 11:30) (93% - 95%) Daily     Daily Weight in k.8 (09 Dec 2021 04:49)  CAPILLARY BLOOD GLUCOSE      POCT Blood Glucose.: 75 mg/dL (09 Dec 2021 11:56)  POCT Blood Glucose.: 51 mg/dL (09 Dec 2021 11:22)  POCT Blood Glucose.: 71 mg/dL (09 Dec 2021 07:58)  POCT Blood Glucose.: 74 mg/dL (08 Dec 2021 21:31)    I&O's Summary    08 Dec 2021 07:01  -  09 Dec 2021 07:00  --------------------------------------------------------  IN: 1500 mL / OUT: 0 mL / NET: 1500 mL    09 Dec 2021 07:01  -  09 Dec 2021 13:13  --------------------------------------------------------  IN: 500 mL / OUT: 0 mL / NET: 500 mL        LABS:                        15.0   8.23  )-----------( 257      ( 08 Dec 2021 07:24 )             46.6         147<H>  |  113<H>  |  29<H>  ----------------------------<  68<L>  3.9   |  27  |  1.04    Ca    9.4      09 Dec 2021 06:50    TPro  7.3  /  Alb  2.8<L>  /  TBili  0.9  /  DBili  0.3  /  AST  75<H>  /  ALT  54  /  AlkPhos  89        LIVER FUNCTIONS - ( 08 Dec 2021 07:24 )  Alb: 2.8 g/dL / Pro: 7.3 gm/dL / ALK PHOS: 89 U/L / ALT: 54 U/L / AST: 75 U/L / GGT: x                     MEDICATIONS:  acetaminophen     Tablet .. 650 milliGRAM(s) Oral every 6 hours PRN  atorvastatin 20 milliGRAM(s) Oral at bedtime  clopidogrel Tablet 75 milliGRAM(s) Oral daily  dexAMETHasone  Injectable 6 milliGRAM(s) IV Push daily  dextrose 40% Gel 15 Gram(s) Oral once  dextrose 5% + sodium chloride 0.45%. 1000 milliLiter(s) IV Continuous <Continuous>  dextrose 5%. 1000 milliLiter(s) IV Continuous <Continuous>  dextrose 5%. 1000 milliLiter(s) IV Continuous <Continuous>  dextrose 50% Injectable 25 Gram(s) IV Push once  dextrose 50% Injectable 12.5 Gram(s) IV Push once  dextrose 50% Injectable 25 Gram(s) IV Push once  enoxaparin Injectable 40 milliGRAM(s) SubCutaneous daily  glucagon  Injectable 1 milliGRAM(s) IntraMuscular once  insulin lispro (ADMELOG) corrective regimen sliding scale   SubCutaneous three times a day before meals  insulin lispro (ADMELOG) corrective regimen sliding scale   SubCutaneous at bedtime                               Patient: BERTHA HICKS [id number] 85y Male                            Hospitalist Attending Note    Per aide at bedside, pt taking po.  Took 1 entire bottle of Ensure and 8oz H20.   Denies complaints.     ____________________PHYSICAL EXAM:  GENERAL:  NAD, alert, confused.   HEENT: NCAT  CARDIOVASCULAR:  S1, S2  LUNGS: CTAB  ABDOMEN:  soft, (-) tenderness, (-) distension, (+) bowel sounds, (-) guarding, (-) rebound (-) rigidity  EXTREMITIES:  no cyanosis / clubbing / edema.   ____________________    VITALS:  Vital Signs Last 24 Hrs  T(C): 37.1 (09 Dec 2021 11:30), Max: 37.1 (09 Dec 2021 11:30)  T(F): 98.8 (09 Dec 2021 11:30), Max: 98.8 (09 Dec 2021 11:30)  HR: 92 (09 Dec 2021 11:30) (89 - 92)  BP: 134/81 (09 Dec 2021 11:30) (125/70 - 134/81)  BP(mean): --  RR: 19 (09 Dec 2021 11:30) (17 - 19)  SpO2: 93% (09 Dec 2021 11:30) (93% - 95%) Daily     Daily Weight in k.8 (09 Dec 2021 04:49)  CAPILLARY BLOOD GLUCOSE      POCT Blood Glucose.: 75 mg/dL (09 Dec 2021 11:56)  POCT Blood Glucose.: 51 mg/dL (09 Dec 2021 11:22)  POCT Blood Glucose.: 71 mg/dL (09 Dec 2021 07:58)  POCT Blood Glucose.: 74 mg/dL (08 Dec 2021 21:31)    I&O's Summary    08 Dec 2021 07:01  -  09 Dec 2021 07:00  --------------------------------------------------------  IN: 1500 mL / OUT: 0 mL / NET: 1500 mL    09 Dec 2021 07:01  -  09 Dec 2021 13:13  --------------------------------------------------------  IN: 500 mL / OUT: 0 mL / NET: 500 mL        LABS:                        15.0   8.23  )-----------( 257      ( 08 Dec 2021 07:24 )             46.6         147<H>  |  113<H>  |  29<H>  ----------------------------<  68<L>  3.9   |  27  |  1.04    Ca    9.4      09 Dec 2021 06:50    TPro  7.3  /  Alb  2.8<L>  /  TBili  0.9  /  DBili  0.3  /  AST  75<H>  /  ALT  54  /  AlkPhos  89        LIVER FUNCTIONS - ( 08 Dec 2021 07:24 )  Alb: 2.8 g/dL / Pro: 7.3 gm/dL / ALK PHOS: 89 U/L / ALT: 54 U/L / AST: 75 U/L / GGT: x                     MEDICATIONS:  acetaminophen     Tablet .. 650 milliGRAM(s) Oral every 6 hours PRN  atorvastatin 20 milliGRAM(s) Oral at bedtime  clopidogrel Tablet 75 milliGRAM(s) Oral daily  dexAMETHasone  Injectable 6 milliGRAM(s) IV Push daily  dextrose 40% Gel 15 Gram(s) Oral once  dextrose 5% + sodium chloride 0.45%. 1000 milliLiter(s) IV Continuous <Continuous>  dextrose 5%. 1000 milliLiter(s) IV Continuous <Continuous>  dextrose 5%. 1000 milliLiter(s) IV Continuous <Continuous>  dextrose 50% Injectable 25 Gram(s) IV Push once  dextrose 50% Injectable 12.5 Gram(s) IV Push once  dextrose 50% Injectable 25 Gram(s) IV Push once  enoxaparin Injectable 40 milliGRAM(s) SubCutaneous daily  glucagon  Injectable 1 milliGRAM(s) IntraMuscular once  insulin lispro (ADMELOG) corrective regimen sliding scale   SubCutaneous three times a day before meals  insulin lispro (ADMELOG) corrective regimen sliding scale   SubCutaneous at bedtime

## 2021-12-09 NOTE — PROGRESS NOTE ADULT - ASSESSMENT
85 y o M with PMH of Dementia, CAD, DLD presented with generalized weakness, fever, myalgias.    # BERTHA, Hypernatremia - pt with poor po intake.  Encourage po fluids, nutrition.  IVF.  Repeat BMP improving.  Continue IVF.    # Acute hypoxic resp failure, COVID-19 PNA, Sleep apnea - s/p Remdesivir and decadron, now on RA.  Outpatient COVID vaccination was d/w pt's daughter.    # Acute cystitis.  -culture growing E.coli. - s/p rocephin  # Dysphagia / Severe protein-calorie malnutrition-  Diet Soft and Bite Sized- Supplement Feeding Modality:  Oral  Ensure Enlive Cans or Servings Per Day:  1 Frequency:  Two Times a day aspiration precaution  # Back pain MR of thoracic/lumbar spine 1. Extensive degenerative changes with underlying thoracolumbar scoliosis. Multiple subluxations along with disc disease and hypertrophic changes of the posterior elements. 2. Severe foraminal narrowing at multiple levels, with moderate to severe central and recess narrowing in the mid and lower lumbar region. No intervention as per ortho. Pain controlled -PT followup   # CAD s/p stents few years ago as per daughter -continue plavix, statin  # Hyperlipidemia.  -continue lipitor.  # Dementia- supportive care.  DVT prop: Lovenox 40 daily.    Plan of care discussed with daughter Enid at [phone] on 12/7, 12/8.  Message left on 12/9  Continue IVF.  Anticipate d/c 12/10.  85 y o M with PMH of Dementia, CAD, DLD presented with generalized weakness, fever, myalgias.    # BERTHA, Hypernatremia - pt with poor po intake.  Encourage po fluids, nutrition.  IVF.  Repeat BMP improving.  Continue IVF.    # Hypoglycemia - due to poor po intake.  Encourage po intake.  Add Dextrose to IVF.  D/c FS coverage.    # Acute hypoxic resp failure, COVID-19 PNA, Sleep apnea - s/p Remdesivir and decadron, now on RA.  Outpatient COVID vaccination was d/w pt's daughter.    # Acute cystitis.  -culture growing E.coli. - s/p rocephin  # Dysphagia / Severe protein-calorie malnutrition-  Diet Soft and Bite Sized- Supplement Feeding Modality:  Oral  Ensure Enlive Cans or Servings Per Day:  1 Frequency:  Two Times a day aspiration precaution  # Back pain MR of thoracic/lumbar spine 1. Extensive degenerative changes with underlying thoracolumbar scoliosis. Multiple subluxations along with disc disease and hypertrophic changes of the posterior elements. 2. Severe foraminal narrowing at multiple levels, with moderate to severe central and recess narrowing in the mid and lower lumbar region. No intervention as per ortho. Pain controlled -PT followup   # CAD s/p stents few years ago as per daughter -continue plavix, statin  # Hyperlipidemia.  -continue lipitor.  # Dementia- supportive care.  DVT prop: Lovenox 40 daily.    Plan of care discussed with daughter Enid at [phone] on 12/7, 12/8.  Message left on 12/9  Continue IVF.  Anticipate d/c 12/10.

## 2021-12-10 ENCOUNTER — TRANSCRIPTION ENCOUNTER (OUTPATIENT)
Age: 86
End: 2021-12-10

## 2021-12-10 LAB
ANION GAP SERPL CALC-SCNC: 7 MMOL/L — SIGNIFICANT CHANGE UP (ref 5–17)
BUN SERPL-MCNC: 19 MG/DL — SIGNIFICANT CHANGE UP (ref 7–23)
CALCIUM SERPL-MCNC: 9.1 MG/DL — SIGNIFICANT CHANGE UP (ref 8.5–10.1)
CHLORIDE SERPL-SCNC: 108 MMOL/L — SIGNIFICANT CHANGE UP (ref 96–108)
CO2 SERPL-SCNC: 28 MMOL/L — SIGNIFICANT CHANGE UP (ref 22–31)
CREAT SERPL-MCNC: 0.98 MG/DL — SIGNIFICANT CHANGE UP (ref 0.5–1.3)
CREAT SERPL-MCNC: 1 MG/DL — SIGNIFICANT CHANGE UP (ref 0.5–1.3)
FLUAV AG NPH QL: SIGNIFICANT CHANGE UP
FLUBV AG NPH QL: SIGNIFICANT CHANGE UP
GLUCOSE BLDC GLUCOMTR-MCNC: 58 MG/DL — LOW (ref 70–99)
GLUCOSE BLDC GLUCOMTR-MCNC: 66 MG/DL — LOW (ref 70–99)
GLUCOSE BLDC GLUCOMTR-MCNC: 83 MG/DL — SIGNIFICANT CHANGE UP (ref 70–99)
GLUCOSE BLDC GLUCOMTR-MCNC: 87 MG/DL — SIGNIFICANT CHANGE UP (ref 70–99)
GLUCOSE BLDC GLUCOMTR-MCNC: 88 MG/DL — SIGNIFICANT CHANGE UP (ref 70–99)
GLUCOSE BLDC GLUCOMTR-MCNC: 90 MG/DL — SIGNIFICANT CHANGE UP (ref 70–99)
GLUCOSE SERPL-MCNC: 85 MG/DL — SIGNIFICANT CHANGE UP (ref 70–99)
POTASSIUM SERPL-MCNC: 3.8 MMOL/L — SIGNIFICANT CHANGE UP (ref 3.5–5.3)
POTASSIUM SERPL-SCNC: 3.8 MMOL/L — SIGNIFICANT CHANGE UP (ref 3.5–5.3)
SARS-COV-2 RNA SPEC QL NAA+PROBE: DETECTED
SODIUM SERPL-SCNC: 143 MMOL/L — SIGNIFICANT CHANGE UP (ref 135–145)

## 2021-12-10 PROCEDURE — 99232 SBSQ HOSP IP/OBS MODERATE 35: CPT

## 2021-12-10 RX ADMIN — ATORVASTATIN CALCIUM 20 MILLIGRAM(S): 80 TABLET, FILM COATED ORAL at 21:50

## 2021-12-10 RX ADMIN — ENOXAPARIN SODIUM 40 MILLIGRAM(S): 100 INJECTION SUBCUTANEOUS at 14:28

## 2021-12-10 RX ADMIN — CLOPIDOGREL BISULFATE 75 MILLIGRAM(S): 75 TABLET, FILM COATED ORAL at 14:28

## 2021-12-10 NOTE — DISCHARGE NOTE NURSING/CASE MANAGEMENT/SOCIAL WORK - PATIENT PORTAL LINK FT
You can access the FollowMyHealth Patient Portal offered by [facility] by registering at the following website: [email or web address]. By joining [employer]’s FollowMyHealth portal, you will also be able to view your health information using other applications (apps) compatible with our system.

## 2021-12-10 NOTE — PROGRESS NOTE ADULT - SUBJECTIVE AND OBJECTIVE BOX
Patient: BERTHA HICKS [id number] 85y Male                            Hospitalist Attending Note    RN reports pt takes po intake.  More coherent.     ____________________PHYSICAL EXAM:  GENERAL:  NAD, Alert and Oriented x 1 to person   HEENT: NCAT  CARDIOVASCULAR:  S1, S2  LUNGS: CTAB  ABDOMEN:  soft, (-) tenderness, (-) distension, (+) bowel sounds, (-) guarding, (-) rebound (-) rigidity  EXTREMITIES:  no cyanosis / clubbing / edema.   ____________________    VITALS:  Vital Signs Last 24 Hrs  T(C): 36.7 (10 Dec 2021 12:55), Max: 36.8 (09 Dec 2021 17:08)  T(F): 98 (10 Dec 2021 12:55), Max: 98.3 (09 Dec 2021 17:08)  HR: 66 (10 Dec 2021 12:55) (66 - 87)  BP: 124/73 (10 Dec 2021 12:55) (112/75 - 128/76)  BP(mean): --  RR: 17 (10 Dec 2021 12:55) (17 - 18)  SpO2: 98% (10 Dec 2021 12:55) (94% - 100%) Daily     Daily Weight in k (10 Dec 2021 05:55)  CAPILLARY BLOOD GLUCOSE      POCT Blood Glucose.: 87 mg/dL (10 Dec 2021 11:58)  POCT Blood Glucose.: 83 mg/dL (10 Dec 2021 09:27)  POCT Blood Glucose.: 66 mg/dL (10 Dec 2021 08:31)  POCT Blood Glucose.: 58 mg/dL (10 Dec 2021 08:09)  POCT Blood Glucose.: 116 mg/dL (09 Dec 2021 21:15)  POCT Blood Glucose.: 106 mg/dL (09 Dec 2021 16:42)    I&O's Summary    09 Dec 2021 07:01  -  10 Dec 2021 07:00  --------------------------------------------------------  IN: 620 mL / OUT: 0 mL / NET: 620 mL        LABS:    12-10    143  |  108  |  19  ----------------------------<  85  3.8   |  28  |  1.00    Ca    9.1      10 Dec 2021 06:29                    MEDICATIONS:  acetaminophen     Tablet .. 650 milliGRAM(s) Oral every 6 hours PRN  atorvastatin 20 milliGRAM(s) Oral at bedtime  clopidogrel Tablet 75 milliGRAM(s) Oral daily  dextrose 40% Gel 15 Gram(s) Oral once  dextrose 5%. 1000 milliLiter(s) IV Continuous <Continuous>  dextrose 5%. 1000 milliLiter(s) IV Continuous <Continuous>  dextrose 50% Injectable 25 Gram(s) IV Push once  dextrose 50% Injectable 12.5 Gram(s) IV Push once  dextrose 50% Injectable 25 Gram(s) IV Push once  enoxaparin Injectable 40 milliGRAM(s) SubCutaneous daily  glucagon  Injectable 1 milliGRAM(s) IntraMuscular once

## 2021-12-10 NOTE — PROGRESS NOTE ADULT - ASSESSMENT
85 y o M with PMH of Dementia, CAD, DLD presented with generalized weakness, fever, myalgias.    # BERTHA, Hypernatremia - pt with poor po intake.  Encourage po fluids, nutrition.  s/p IVF.  Now normalized.    # Hypoglycemia - due to poor po intake.  Encourage po intake.   # Acute hypoxic resp failure, COVID-19 PNA, Sleep apnea - s/p Remdesivir and decadron, now on RA.  Outpatient COVID vaccination was d/w pt's daughter.    # Acute cystitis.  -culture growing E.coli. - s/p rocephin  # Dysphagia / Severe protein-calorie malnutrition-  Diet Soft and Bite Sized- Supplement Feeding Modality:  Oral  Ensure Enlive Cans or Servings Per Day:  1 Frequency:  Two Times a day aspiration precaution  # Back pain MR of thoracic/lumbar spine 1. Extensive degenerative changes with underlying thoracolumbar scoliosis. Multiple subluxations along with disc disease and hypertrophic changes of the posterior elements. 2. Severe foraminal narrowing at multiple levels, with moderate to severe central and recess narrowing in the mid and lower lumbar region. No intervention as per ortho. Pain controlled -PT followup   # CAD s/p stents few years ago as per daughter -continue plavix, statin  # Hyperlipidemia.  -continue lipitor.  # Dementia- supportive care.  DVT prop: Lovenox 40 daily.    Plan of care discussed with daughter Enid at [phone] on 12/7, 12/8.  Message left on 12/9  Plan d/c to [facility].

## 2021-12-10 NOTE — CHART NOTE - NSCHARTNOTEFT_GEN_A_CORE
Nutrition follow-up note:    Pt adm w/COVID, UTI. PMHx includes dementia, CAD, HLD. Per chart pt is confused, disoriented and, at times, uncooperative w/care & combative. Pt is on b/l wrist restraints due to pulling on lines. Pt is fed by staff and noted w/variable PO intake. Noted low POCT Glucose, likely due to poor PO intake. Pt is receiving IVF (Dextrose 5% + NaCL 0.45%). D/C plan likely [facility].     Factors impacting intake: [ ] none [ ] nausea  [ ] vomiting [ ] diarrhea [ ] constipation  [ ]chewing problems [ ] swallowing issues  [x ] other: AMS, difficulty feeding self.    Diet Prescription: Soft & Bite-sized + Ensure Enlive x 2/day (provides 700 kcal, 40 g protein).  Intake: mostly 0-25% intake of meals, pt occasionally drinks 100% Ensure Enlive shakes.     Current Weight: (12/10) 65 kg, (11/26 adm) 70 kg.  % Weight Change: 7% loss (5 kg) x 14 days/since admission.  No edema documented.  Unable to conduct nutrition focused physical exam due to pt on COVID isolation.     Pertinent Medications: MEDICATIONS  (STANDING):  atorvastatin 20 milliGRAM(s) Oral at bedtime  clopidogrel Tablet 75 milliGRAM(s) Oral daily  dextrose 40% Gel 15 Gram(s) Oral once  dextrose 5% + sodium chloride 0.45%. 1000 milliLiter(s) (125 mL/Hr) IV Continuous <Continuous>  dextrose 5%. 1000 milliLiter(s) (50 mL/Hr) IV Continuous <Continuous>  dextrose 5%. 1000 milliLiter(s) (100 mL/Hr) IV Continuous <Continuous>  dextrose 50% Injectable 25 Gram(s) IV Push once  dextrose 50% Injectable 12.5 Gram(s) IV Push once  dextrose 50% Injectable 25 Gram(s) IV Push once  enoxaparin Injectable 40 milliGRAM(s) SubCutaneous daily  glucagon  Injectable 1 milliGRAM(s) IntraMuscular once    MEDICATIONS  (PRN):  acetaminophen     Tablet .. 650 milliGRAM(s) Oral every 6 hours PRN Temp greater or equal to 38C (100.4F), Moderate Pain (4 - 6)    Pertinent Labs: 12-10 Na143 mmol/L Glu 85 mg/dL K+ 3.8 mmol/L Cr  1.00 mg/dL BUN 19 mg/dL 12-08 Alb 2.8 g/dL<L>    11-27-21 @ 10:34A1C 5.8  POCT Glucose (12/10) 66, 58, (12/9) 116, 106, 75, 51, 71.    Skin: no pressure ulcers.    Estimated Needs:   [x ] no change since previous assessment (12/3)  [ ] recalculated:     Previous Nutrition Diagnosis:   Malnutrition Severe Malnutrition in the context of Acute Illness.     Etiology Inadequate energy/protein intake ; Increased energy protein needs related to Covid PNA.     Signs/Symptoms > 2% wt loss in one week ; < 50 % nutrition needs > 5 days.   NEW signs/symptoms: 7% wt loss x 2 weeks; <50% intake of nutritional needs >5 days.    Goal/Expected Outcome Pt will Meet calorie and protein needs > 50% via  meals / supplements (GOAL not consistently).    Nutrition Diagnosis is [x ] ongoing  [ ] resolved [ ] not applicable     New Nutrition Diagnosis: [x ] not applicable      Interventions:   Recommend:  [x] Continue current diet as Rx'd.  [ ] Change Diet To:  [x ] Nutrition Supplement: Increase Ensure Enlive to x 3/day (350 kcal + 20 gm protein/each).   [ ] Nutrition Support  [ ] Other:     Monitoring and Evaluation:   [x ] PO intake [ x ] Tolerance to diet prescription [ x ] weights [ x ] labs[ x ] follow up per protocol  [ ] other:

## 2021-12-10 NOTE — DISCHARGE NOTE NURSING/CASE MANAGEMENT/SOCIAL WORK - NSDCPEFALRISK_GEN_ALL_CORE
For information on Fall & Injury Prevention, visit: [email or web address]  [email or web address] OR  https://www.cdc.gov/steadi/patient.html

## 2021-12-11 LAB
ANION GAP SERPL CALC-SCNC: 5 MMOL/L — SIGNIFICANT CHANGE UP (ref 5–17)
BUN SERPL-MCNC: 15 MG/DL — SIGNIFICANT CHANGE UP (ref 7–23)
CALCIUM SERPL-MCNC: 9.3 MG/DL — SIGNIFICANT CHANGE UP (ref 8.5–10.1)
CHLORIDE SERPL-SCNC: 110 MMOL/L — HIGH (ref 96–108)
CO2 SERPL-SCNC: 29 MMOL/L — SIGNIFICANT CHANGE UP (ref 22–31)
CREAT SERPL-MCNC: 1.01 MG/DL — SIGNIFICANT CHANGE UP (ref 0.5–1.3)
GLUCOSE BLDC GLUCOMTR-MCNC: 132 MG/DL — HIGH (ref 70–99)
GLUCOSE BLDC GLUCOMTR-MCNC: 78 MG/DL — SIGNIFICANT CHANGE UP (ref 70–99)
GLUCOSE BLDC GLUCOMTR-MCNC: 86 MG/DL — SIGNIFICANT CHANGE UP (ref 70–99)
GLUCOSE BLDC GLUCOMTR-MCNC: 94 MG/DL — SIGNIFICANT CHANGE UP (ref 70–99)
GLUCOSE SERPL-MCNC: 84 MG/DL — SIGNIFICANT CHANGE UP (ref 70–99)
HCT VFR BLD CALC: 41.7 % — SIGNIFICANT CHANGE UP (ref 39–50)
HGB BLD-MCNC: 13.6 G/DL — SIGNIFICANT CHANGE UP (ref 13–17)
MCHC RBC-ENTMCNC: 30.1 PG — SIGNIFICANT CHANGE UP (ref 27–34)
MCHC RBC-ENTMCNC: 32.6 GM/DL — SIGNIFICANT CHANGE UP (ref 32–36)
MCV RBC AUTO: 92.3 FL — SIGNIFICANT CHANGE UP (ref 80–100)
NRBC # BLD: 0 /100 WBCS — SIGNIFICANT CHANGE UP (ref 0–0)
PLATELET # BLD AUTO: 233 K/UL — SIGNIFICANT CHANGE UP (ref 150–400)
POTASSIUM SERPL-MCNC: 3.9 MMOL/L — SIGNIFICANT CHANGE UP (ref 3.5–5.3)
POTASSIUM SERPL-SCNC: 3.9 MMOL/L — SIGNIFICANT CHANGE UP (ref 3.5–5.3)
RBC # BLD: 4.52 M/UL — SIGNIFICANT CHANGE UP (ref 4.2–5.8)
RBC # FLD: 15 % — HIGH (ref 10.3–14.5)
SODIUM SERPL-SCNC: 144 MMOL/L — SIGNIFICANT CHANGE UP (ref 135–145)
WBC # BLD: 5.42 K/UL — SIGNIFICANT CHANGE UP (ref 3.8–10.5)
WBC # FLD AUTO: 5.42 K/UL — SIGNIFICANT CHANGE UP (ref 3.8–10.5)

## 2021-12-11 PROCEDURE — 99232 SBSQ HOSP IP/OBS MODERATE 35: CPT

## 2021-12-11 RX ADMIN — CLOPIDOGREL BISULFATE 75 MILLIGRAM(S): 75 TABLET, FILM COATED ORAL at 12:02

## 2021-12-11 RX ADMIN — ENOXAPARIN SODIUM 40 MILLIGRAM(S): 100 INJECTION SUBCUTANEOUS at 12:02

## 2021-12-11 RX ADMIN — ATORVASTATIN CALCIUM 20 MILLIGRAM(S): 80 TABLET, FILM COATED ORAL at 21:49

## 2021-12-11 NOTE — PROGRESS NOTE ADULT - ASSESSMENT
85 y o M with PMH of Dementia, CAD, DLD presented with generalized weakness, fever, myalgias.    # BERTHA, Hypernatremia - pt with poor po intake.  Encourage po fluids, nutrition.  s/p IVF.  Now normalized.    # Hypoglycemia - due to poor po intake.  Encourage po intake.   # Acute hypoxic resp failure, COVID-19 PNA, Sleep apnea - s/p Remdesivir and decadron, now on RA.  Outpatient COVID vaccination was d/w pt's daughter.    # Metabolic Encephalopathy / Dementia - due to above.  Some improvement.  Supportive care.    # Acute cystitis.  -culture growing E.coli. - s/p rocephin  # Dysphagia / Severe protein-calorie malnutrition-  Diet Soft and Bite Sized- Supplement Feeding Modality:  Oral  Ensure Enlive Cans or Servings Per Day:  1 Frequency:  Two Times a day aspiration precaution  # Back pain MR of thoracic/lumbar spine 1. Extensive degenerative changes with underlying thoracolumbar scoliosis. Multiple subluxations along with disc disease and hypertrophic changes of the posterior elements. 2. Severe foraminal narrowing at multiple levels, with moderate to severe central and recess narrowing in the mid and lower lumbar region. No intervention as per ortho. Pain controlled -PT followup   # CAD s/p stents few years ago as per daughter -continue plavix, statin  # Hyperlipidemia.  -continue lipitor.  DVT prop: Lovenox 40 daily.    Plan of care discussed with daughter Enid at [phone] on 12/7, 12/8, 12/11  Family requesting d/c to [facility]

## 2021-12-11 NOTE — PROGRESS NOTE ADULT - SUBJECTIVE AND OBJECTIVE BOX
Patient: BERTHA HICKS [id number] 85y Male                            Hospitalist Attending Note    Takes some po intake.    ____________________PHYSICAL EXAM:  GENERAL:  NAD, Alert and Oriented x 1 to person   HEENT: NCAT  CARDIOVASCULAR:  S1, S2  LUNGS: CTAB  ABDOMEN:  soft, (-) tenderness, (-) distension, (+) bowel sounds, (-) guarding, (-) rebound (-) rigidity  EXTREMITIES:  no cyanosis / clubbing / edema.   ____________________    VITALS:  Vital Signs Last 24 Hrs  T(C): 36.7 (11 Dec 2021 11:17), Max: 36.7 (11 Dec 2021 11:17)  T(F): 98.1 (11 Dec 2021 11:17), Max: 98.1 (11 Dec 2021 11:17)  HR: 90 (11 Dec 2021 11:17) (78 - 90)  BP: 112/77 (11 Dec 2021 11:17) (112/77 - 131/83)  BP(mean): --  RR: 19 (11 Dec 2021 11:17) (17 - 19)  SpO2: 97% (11 Dec 2021 11:17) (94% - 97%) Daily     Daily   CAPILLARY BLOOD GLUCOSE      POCT Blood Glucose.: 94 mg/dL (11 Dec 2021 11:52)  POCT Blood Glucose.: 78 mg/dL (11 Dec 2021 07:29)  POCT Blood Glucose.: 88 mg/dL (10 Dec 2021 21:54)  POCT Blood Glucose.: 90 mg/dL (10 Dec 2021 16:27)    I&O's Summary    11 Dec 2021 07:01  -  11 Dec 2021 15:14  --------------------------------------------------------  IN: 420 mL / OUT: 0 mL / NET: 420 mL        LABS:                        13.6   5.42  )-----------( 233      ( 11 Dec 2021 07:28 )             41.7     12-11    144  |  110<H>  |  15  ----------------------------<  84  3.9   |  29  |  1.01    Ca    9.3      11 Dec 2021 07:28                    MEDICATIONS:  acetaminophen     Tablet .. 650 milliGRAM(s) Oral every 6 hours PRN  atorvastatin 20 milliGRAM(s) Oral at bedtime  clopidogrel Tablet 75 milliGRAM(s) Oral daily  dextrose 40% Gel 15 Gram(s) Oral once  dextrose 5%. 1000 milliLiter(s) IV Continuous <Continuous>  dextrose 5%. 1000 milliLiter(s) IV Continuous <Continuous>  dextrose 50% Injectable 25 Gram(s) IV Push once  dextrose 50% Injectable 12.5 Gram(s) IV Push once  dextrose 50% Injectable 25 Gram(s) IV Push once  enoxaparin Injectable 40 milliGRAM(s) SubCutaneous daily  glucagon  Injectable 1 milliGRAM(s) IntraMuscular once

## 2021-12-12 LAB
GLUCOSE BLDC GLUCOMTR-MCNC: 113 MG/DL — HIGH (ref 70–99)
GLUCOSE BLDC GLUCOMTR-MCNC: 75 MG/DL — SIGNIFICANT CHANGE UP (ref 70–99)
GLUCOSE BLDC GLUCOMTR-MCNC: 77 MG/DL — SIGNIFICANT CHANGE UP (ref 70–99)
GLUCOSE BLDC GLUCOMTR-MCNC: 86 MG/DL — SIGNIFICANT CHANGE UP (ref 70–99)

## 2021-12-12 PROCEDURE — 99232 SBSQ HOSP IP/OBS MODERATE 35: CPT

## 2021-12-12 RX ADMIN — ENOXAPARIN SODIUM 40 MILLIGRAM(S): 100 INJECTION SUBCUTANEOUS at 12:10

## 2021-12-12 RX ADMIN — ATORVASTATIN CALCIUM 20 MILLIGRAM(S): 80 TABLET, FILM COATED ORAL at 21:46

## 2021-12-12 RX ADMIN — CLOPIDOGREL BISULFATE 75 MILLIGRAM(S): 75 TABLET, FILM COATED ORAL at 12:10

## 2021-12-12 NOTE — PROGRESS NOTE ADULT - ASSESSMENT
85 y o M with PMH of Dementia, CAD, DLD presented with generalized weakness, fever, myalgias.    # BERTHA, Hypernatremia - pt with poor po intake.  Encourage po fluids, nutrition.  s/p IVF.  Now normalized.    # Hypoglycemia - due to poor po intake.  Encourage po intake.   # Acute hypoxic resp failure, COVID-19 PNA, Sleep apnea - s/p Remdesivir and decadron, now on RA.  Outpatient COVID vaccination was d/w pt's daughter.    # Metabolic Encephalopathy / Dementia - due to above.  Some improvement.  Supportive care.    # Acute cystitis.  -culture growing E.coli. - s/p rocephin  # Dysphagia / Severe protein-calorie malnutrition-  Diet Soft and Bite Sized- Supplement Feeding Modality:  Oral  Ensure Enlive Cans or Servings Per Day:  1 Frequency:  Two Times a day aspiration precaution  # Back pain MR of thoracic/lumbar spine 1. Extensive degenerative changes with underlying thoracolumbar scoliosis. Multiple subluxations along with disc disease and hypertrophic changes of the posterior elements. 2. Severe foraminal narrowing at multiple levels, with moderate to severe central and recess narrowing in the mid and lower lumbar region. No intervention as per ortho. Pain controlled -PT followup   # CAD s/p stents few years ago as per daughter -continue plavix, statin  # Hyperlipidemia.  -continue lipitor.  DVT prop: Lovenox 40 daily.    Plan of care discussed with daughter Enid at [phone] on 12/7, 12/8, 12/11  Family requesting d/c to [facility]

## 2021-12-12 NOTE — PROGRESS NOTE ADULT - SUBJECTIVE AND OBJECTIVE BOX
Patient: BERTHA HICKS [id number] 85y Male                            Hospitalist Attending Note    Denies complaints.  Remains alert, confused at times.     ____________________PHYSICAL EXAM:  GENERAL:  NAD, Alert and Oriented x 1 to person   HEENT: NCAT  CARDIOVASCULAR:  S1, S2  LUNGS: CTAB  ABDOMEN:  soft, (-) tenderness, (-) distension, (+) bowel sounds, (-) guarding, (-) rebound (-) rigidity  EXTREMITIES:  no cyanosis / clubbing / edema.   ____________________    VITALS:  Vital Signs Last 24 Hrs  T(C): 36.3 (12 Dec 2021 11:22), Max: 36.7 (12 Dec 2021 05:50)  T(F): 97.3 (12 Dec 2021 11:22), Max: 98.1 (12 Dec 2021 05:50)  HR: 100 (12 Dec 2021 11:22) (79 - 100)  BP: 119/81 (12 Dec 2021 11:22) (109/71 - 125/65)  BP(mean): --  RR: 18 (12 Dec 2021 11:22) (18 - 19)  SpO2: 94% (12 Dec 2021 11:22) (94% - 97%) Daily     Daily   CAPILLARY BLOOD GLUCOSE      POCT Blood Glucose.: 113 mg/dL (12 Dec 2021 11:40)  POCT Blood Glucose.: 77 mg/dL (12 Dec 2021 07:43)  POCT Blood Glucose.: 132 mg/dL (11 Dec 2021 21:50)  POCT Blood Glucose.: 86 mg/dL (11 Dec 2021 16:34)    I&O's Summary    11 Dec 2021 07:01  -  12 Dec 2021 07:00  --------------------------------------------------------  IN: 420 mL / OUT: 0 mL / NET: 420 mL        LABS:                        13.6   5.42  )-----------( 233      ( 11 Dec 2021 07:28 )             41.7     12-11    144  |  110<H>  |  15  ----------------------------<  84  3.9   |  29  |  1.01    Ca    9.3      11 Dec 2021 07:28                    MEDICATIONS:  acetaminophen     Tablet .. 650 milliGRAM(s) Oral every 6 hours PRN  atorvastatin 20 milliGRAM(s) Oral at bedtime  clopidogrel Tablet 75 milliGRAM(s) Oral daily  dextrose 40% Gel 15 Gram(s) Oral once  dextrose 5%. 1000 milliLiter(s) IV Continuous <Continuous>  dextrose 5%. 1000 milliLiter(s) IV Continuous <Continuous>  dextrose 50% Injectable 25 Gram(s) IV Push once  dextrose 50% Injectable 12.5 Gram(s) IV Push once  dextrose 50% Injectable 25 Gram(s) IV Push once  enoxaparin Injectable 40 milliGRAM(s) SubCutaneous daily  glucagon  Injectable 1 milliGRAM(s) IntraMuscular once

## 2021-12-13 LAB
GLUCOSE BLDC GLUCOMTR-MCNC: 100 MG/DL — HIGH (ref 70–99)
GLUCOSE BLDC GLUCOMTR-MCNC: 100 MG/DL — HIGH (ref 70–99)
GLUCOSE BLDC GLUCOMTR-MCNC: 103 MG/DL — HIGH (ref 70–99)
GLUCOSE BLDC GLUCOMTR-MCNC: 94 MG/DL — SIGNIFICANT CHANGE UP (ref 70–99)

## 2021-12-13 PROCEDURE — 99232 SBSQ HOSP IP/OBS MODERATE 35: CPT

## 2021-12-13 RX ADMIN — ENOXAPARIN SODIUM 40 MILLIGRAM(S): 100 INJECTION SUBCUTANEOUS at 12:46

## 2021-12-13 RX ADMIN — ATORVASTATIN CALCIUM 20 MILLIGRAM(S): 80 TABLET, FILM COATED ORAL at 22:00

## 2021-12-13 RX ADMIN — CLOPIDOGREL BISULFATE 75 MILLIGRAM(S): 75 TABLET, FILM COATED ORAL at 12:46

## 2021-12-13 NOTE — PROGRESS NOTE ADULT - SUBJECTIVE AND OBJECTIVE BOX
Patient: BERTHA HICKS [id number] 85y Male                            Hospitalist Attending Note    Denies complaints.  Remains alert, confused at times.   He drank an entire 8 oz bottle of water with my assistance.      ____________________PHYSICAL EXAM:  GENERAL:  NAD, Alert and Oriented x 1 to person   HEENT: NCAT  CARDIOVASCULAR:  S1, S2  LUNGS: CTAB  ABDOMEN:  soft, (-) tenderness, (-) distension, (+) bowel sounds, (-) guarding, (-) rebound (-) rigidity  EXTREMITIES:  no cyanosis / clubbing / edema.   ____________________    VITALS:  Vital Signs Last 24 Hrs  T(C): 36.8 (13 Dec 2021 12:40), Max: 37.1 (13 Dec 2021 00:11)  T(F): 98.2 (13 Dec 2021 12:40), Max: 98.7 (13 Dec 2021 00:11)  HR: 94 (13 Dec 2021 12:40) (75 - 96)  BP: 118/62 (13 Dec 2021 12:40) (110/65 - 124/64)  BP(mean): --  RR: 18 (13 Dec 2021 12:40) (18 - 19)  SpO2: 94% (13 Dec 2021 12:40) (93% - 98%) Daily     Daily Weight in k.9 (13 Dec 2021 05:25)  CAPILLARY BLOOD GLUCOSE      POCT Blood Glucose.: 103 mg/dL (13 Dec 2021 11:42)  POCT Blood Glucose.: 100 mg/dL (13 Dec 2021 08:26)  POCT Blood Glucose.: 75 mg/dL (12 Dec 2021 21:47)  POCT Blood Glucose.: 86 mg/dL (12 Dec 2021 17:48)    I&O's Summary    12 Dec 2021 07:01  -  13 Dec 2021 07:00  --------------------------------------------------------  IN: 460 mL / OUT: 0 mL / NET: 460 mL        LABS:                        MEDICATIONS:  acetaminophen     Tablet .. 650 milliGRAM(s) Oral every 6 hours PRN  atorvastatin 20 milliGRAM(s) Oral at bedtime  clopidogrel Tablet 75 milliGRAM(s) Oral daily  dextrose 40% Gel 15 Gram(s) Oral once  dextrose 5%. 1000 milliLiter(s) IV Continuous <Continuous>  dextrose 5%. 1000 milliLiter(s) IV Continuous <Continuous>  dextrose 50% Injectable 25 Gram(s) IV Push once  dextrose 50% Injectable 12.5 Gram(s) IV Push once  dextrose 50% Injectable 25 Gram(s) IV Push once  enoxaparin Injectable 40 milliGRAM(s) SubCutaneous daily  glucagon  Injectable 1 milliGRAM(s) IntraMuscular once

## 2021-12-13 NOTE — PROGRESS NOTE ADULT - ASSESSMENT
85 y o M with PMH of Dementia, CAD, DLD presented with generalized weakness, fever, myalgias.  Admitted for COVID infection, s/p Remdesivir and Decadron.  Symptoms improved, now on RA.  Remains slightly confused with poor po intake, improves with encouragement at bedside.  S/p IVF for BERTHA / Hypernatremia.      # BERTHA, Hypernatremia - pt with poor po intake.  Encourage po fluids, nutrition.  s/p IVF.  Now normalized.    # Hypoglycemia - due to poor po intake.  Encourage po intake.   # Acute hypoxic resp failure, COVID-19 PNA, Sleep apnea - s/p Remdesivir and decadron, now on RA.  Outpatient COVID vaccination was d/w pt's daughter.    # Metabolic Encephalopathy / Dementia - due to above.  Some improvement.  Supportive care.    # Acute cystitis.  -culture growing E.coli. - s/p rocephin  # Dysphagia / Severe protein-calorie malnutrition-  Diet Soft and Bite Sized- Supplement Feeding Modality:  Oral  Ensure Enlive Cans or Servings Per Day:  1 Frequency:  Two Times a day aspiration precaution  # Back pain MR of thoracic/lumbar spine 1. Extensive degenerative changes with underlying thoracolumbar scoliosis. Multiple subluxations along with disc disease and hypertrophic changes of the posterior elements. 2. Severe foraminal narrowing at multiple levels, with moderate to severe central and recess narrowing in the mid and lower lumbar region. No intervention as per ortho. Pain controlled -PT followup   # CAD s/p stents few years ago as per daughter -continue plavix, statin  # Hyperlipidemia.  -continue lipitor.  DVT prop: Lovenox 40 daily.    Plan of care discussed with daughter Enid at [phone] on 12/7, 12/8, 12/11  Stable for [facility] placement.

## 2021-12-14 VITALS
HEART RATE: 101 BPM | DIASTOLIC BLOOD PRESSURE: 68 MMHG | TEMPERATURE: 99 F | SYSTOLIC BLOOD PRESSURE: 107 MMHG | RESPIRATION RATE: 18 BRPM | OXYGEN SATURATION: 86 %

## 2021-12-14 LAB
ANION GAP SERPL CALC-SCNC: 4 MMOL/L — LOW (ref 5–17)
BUN SERPL-MCNC: 13 MG/DL — SIGNIFICANT CHANGE UP (ref 7–23)
CALCIUM SERPL-MCNC: 9.6 MG/DL — SIGNIFICANT CHANGE UP (ref 8.5–10.1)
CHLORIDE SERPL-SCNC: 110 MMOL/L — HIGH (ref 96–108)
CO2 SERPL-SCNC: 29 MMOL/L — SIGNIFICANT CHANGE UP (ref 22–31)
CREAT SERPL-MCNC: 1.08 MG/DL — SIGNIFICANT CHANGE UP (ref 0.5–1.3)
GLUCOSE BLDC GLUCOMTR-MCNC: 117 MG/DL — HIGH (ref 70–99)
GLUCOSE BLDC GLUCOMTR-MCNC: 88 MG/DL — SIGNIFICANT CHANGE UP (ref 70–99)
GLUCOSE BLDC GLUCOMTR-MCNC: 97 MG/DL — SIGNIFICANT CHANGE UP (ref 70–99)
GLUCOSE SERPL-MCNC: 83 MG/DL — SIGNIFICANT CHANGE UP (ref 70–99)
HCT VFR BLD CALC: 41 % — SIGNIFICANT CHANGE UP (ref 39–50)
HGB BLD-MCNC: 13.3 G/DL — SIGNIFICANT CHANGE UP (ref 13–17)
MCHC RBC-ENTMCNC: 30 PG — SIGNIFICANT CHANGE UP (ref 27–34)
MCHC RBC-ENTMCNC: 32.4 GM/DL — SIGNIFICANT CHANGE UP (ref 32–36)
MCV RBC AUTO: 92.3 FL — SIGNIFICANT CHANGE UP (ref 80–100)
NRBC # BLD: 0 /100 WBCS — SIGNIFICANT CHANGE UP (ref 0–0)
PLATELET # BLD AUTO: 201 K/UL — SIGNIFICANT CHANGE UP (ref 150–400)
POTASSIUM SERPL-MCNC: 4.6 MMOL/L — SIGNIFICANT CHANGE UP (ref 3.5–5.3)
POTASSIUM SERPL-SCNC: 4.6 MMOL/L — SIGNIFICANT CHANGE UP (ref 3.5–5.3)
RAPID RVP RESULT: DETECTED
RBC # BLD: 4.44 M/UL — SIGNIFICANT CHANGE UP (ref 4.2–5.8)
RBC # FLD: 15.1 % — HIGH (ref 10.3–14.5)
SARS-COV-2 RNA SPEC QL NAA+PROBE: DETECTED
SODIUM SERPL-SCNC: 143 MMOL/L — SIGNIFICANT CHANGE UP (ref 135–145)
WBC # BLD: 7.28 K/UL — SIGNIFICANT CHANGE UP (ref 3.8–10.5)
WBC # FLD AUTO: 7.28 K/UL — SIGNIFICANT CHANGE UP (ref 3.8–10.5)

## 2021-12-14 PROCEDURE — 99232 SBSQ HOSP IP/OBS MODERATE 35: CPT

## 2021-12-14 RX ADMIN — ENOXAPARIN SODIUM 40 MILLIGRAM(S): 100 INJECTION SUBCUTANEOUS at 12:40

## 2021-12-14 RX ADMIN — CLOPIDOGREL BISULFATE 75 MILLIGRAM(S): 75 TABLET, FILM COATED ORAL at 12:40

## 2021-12-14 NOTE — PROGRESS NOTE ADULT - NUTRITIONAL ASSESSMENT
This patient has been assessed with a concern for Malnutrition and has been determined to have a diagnosis/diagnoses of Severe protein-calorie malnutrition.    This patient is being managed with:   Diet Soft and Bite Sized-  Supplement Feeding Modality:  Oral  Ensure Enlive Cans or Servings Per Day:  1       Frequency:  Two Times a day  Entered: Dec  3 2021  9:30AM    

## 2021-12-14 NOTE — PROGRESS NOTE ADULT - REASON FOR ADMISSION
COVID, UTI

## 2021-12-14 NOTE — PROGRESS NOTE ADULT - ASSESSMENT
85 y o M with PMH of Dementia, CAD, DLD presented with generalized weakness, fever, myalgias.  Admitted for COVID infection, s/p Remdesivir and Decadron.  Symptoms improved, now on RA.  Remains slightly confused with poor po intake, improves with encouragement at bedside.  S/p IVF for BERTHA / Hypernatremia.      # BERTHA, Hypernatremia - pt with poor po intake.  Encourage po fluids, nutrition.  s/p IVF.  Now normalized.    # Hypoglycemia - Resolved  Encourage po intake.   # Acute hypoxic resp failure, COVID-19 PNA, Sleep apnea - s/p Remdesivir and decadron, now on RA.  Outpatient COVID vaccination was d/w pt's daughter.    # Metabolic Encephalopathy / Dementia - due to above.  Some improvement.  Supportive care.    # Acute cystitis.  -culture growing E.coli. - s/p rocephin  # Dysphagia / Severe protein-calorie malnutrition-  Diet Soft and Bite Sized- Supplement Feeding Modality:  Oral  Ensure Enlive Cans or Servings Per Day:  1 Frequency:  Two Times a day aspiration precaution  # Back pain MR of thoracic/lumbar spine 1. Extensive degenerative changes with underlying thoracolumbar scoliosis. Multiple subluxations along with disc disease and hypertrophic changes of the posterior elements. 2. Severe foraminal narrowing at multiple levels, with moderate to severe central and recess narrowing in the mid and lower lumbar region. No intervention as per ortho. Pain controlled -PT followup   # CAD s/p stents few years ago as per daughter -continue plavix, statin  # Hyperlipidemia.  -continue lipitor.  DVT prop: Lovenox 40 daily.    Plan of care discussed with daughter Enid at [phone] on 12/7, 12/8, 12/11  Stable for [facility] placement.

## 2021-12-14 NOTE — PROGRESS NOTE ADULT - SUBJECTIVE AND OBJECTIVE BOX
Patient: BERTHA HICKS [id number] 85y Male                            Hospitalist Attending Note    Denies complaints.  Remains alert, confused at times.     ____________________PHYSICAL EXAM:  GENERAL:  NAD, Alert and Oriented x 1 to person   HEENT: NCAT  CARDIOVASCULAR:  S1, S2  LUNGS: CTAB  ABDOMEN:  soft, (-) tenderness, (-) distension, (+) bowel sounds, (-) guarding, (-) rebound (-) rigidity  EXTREMITIES:  no cyanosis / clubbing / edema.   ____________________      VITALS:  Vital Signs Last 24 Hrs  T(C): 36.6 (14 Dec 2021 05:00), Max: 37.3 (13 Dec 2021 16:51)  T(F): 97.9 (14 Dec 2021 05:00), Max: 99.1 (13 Dec 2021 16:51)  HR: 71 (14 Dec 2021 05:00) (71 - 94)  BP: 110/70 (14 Dec 2021 05:00) (110/70 - 131/72)  BP(mean): --  RR: 17 (14 Dec 2021 05:00) (17 - 18)  SpO2: 93% (14 Dec 2021 05:00) (91% - 94%) Daily     Daily Weight in k.6 (14 Dec 2021 05:00)  CAPILLARY BLOOD GLUCOSE      POCT Blood Glucose.: 97 mg/dL (14 Dec 2021 11:21)  POCT Blood Glucose.: 88 mg/dL (14 Dec 2021 07:59)  POCT Blood Glucose.: 100 mg/dL (13 Dec 2021 22:06)  POCT Blood Glucose.: 94 mg/dL (13 Dec 2021 16:36)    I&O's Summary      LABS:                        13.3   7.28  )-----------( 201      ( 14 Dec 2021 07:41 )             41.0     12-14    143  |  110<H>  |  13  ----------------------------<  83  4.6   |  29  |  1.08    Ca    9.6      14 Dec 2021 07:41                    MEDICATIONS:  acetaminophen     Tablet .. 650 milliGRAM(s) Oral every 6 hours PRN  atorvastatin 20 milliGRAM(s) Oral at bedtime  clopidogrel Tablet 75 milliGRAM(s) Oral daily  dextrose 40% Gel 15 Gram(s) Oral once  dextrose 5%. 1000 milliLiter(s) IV Continuous <Continuous>  dextrose 5%. 1000 milliLiter(s) IV Continuous <Continuous>  dextrose 50% Injectable 25 Gram(s) IV Push once  dextrose 50% Injectable 12.5 Gram(s) IV Push once  dextrose 50% Injectable 25 Gram(s) IV Push once  enoxaparin Injectable 40 milliGRAM(s) SubCutaneous daily  glucagon  Injectable 1 milliGRAM(s) IntraMuscular once

## 2021-12-14 NOTE — PROGRESS NOTE ADULT - PROVIDER SPECIALTY LIST ADULT
Hospitalist
Nephrology
Hospitalist
Infectious Disease
Nephrology
Hospitalist
Infectious Disease
Hospitalist
Hospitalist
Infectious Disease
Hospitalist
Hospitalist

## 2021-12-20 DIAGNOSIS — N30.00 ACUTE CYSTITIS WITHOUT HEMATURIA: ICD-10-CM

## 2021-12-20 DIAGNOSIS — M41.35 THORACOGENIC SCOLIOSIS, THORACOLUMBAR REGION: ICD-10-CM

## 2021-12-20 DIAGNOSIS — J96.01 ACUTE RESPIRATORY FAILURE WITH HYPOXIA: ICD-10-CM

## 2021-12-20 DIAGNOSIS — Z78.1 PHYSICAL RESTRAINT STATUS: ICD-10-CM

## 2021-12-20 DIAGNOSIS — U07.1 COVID-19: ICD-10-CM

## 2021-12-20 DIAGNOSIS — I10 ESSENTIAL (PRIMARY) HYPERTENSION: ICD-10-CM

## 2021-12-20 DIAGNOSIS — N17.9 ACUTE KIDNEY FAILURE, UNSPECIFIED: ICD-10-CM

## 2021-12-20 DIAGNOSIS — I25.10 ATHEROSCLEROTIC HEART DISEASE OF NATIVE CORONARY ARTERY WITHOUT ANGINA PECTORIS: ICD-10-CM

## 2021-12-20 DIAGNOSIS — G47.30 SLEEP APNEA, UNSPECIFIED: ICD-10-CM

## 2021-12-20 DIAGNOSIS — E16.2 HYPOGLYCEMIA, UNSPECIFIED: ICD-10-CM

## 2021-12-20 DIAGNOSIS — F03.90 UNSPECIFIED DEMENTIA WITHOUT BEHAVIORAL DISTURBANCE: ICD-10-CM

## 2021-12-20 DIAGNOSIS — Z95.5 PRESENCE OF CORONARY ANGIOPLASTY IMPLANT AND GRAFT: ICD-10-CM

## 2021-12-20 DIAGNOSIS — R13.10 DYSPHAGIA, UNSPECIFIED: ICD-10-CM

## 2021-12-20 DIAGNOSIS — Z85.46 PERSONAL HISTORY OF MALIGNANT NEOPLASM OF PROSTATE: ICD-10-CM

## 2021-12-20 DIAGNOSIS — J12.82 PNEUMONIA DUE TO CORONAVIRUS DISEASE 2019: ICD-10-CM

## 2021-12-20 DIAGNOSIS — R63.8 OTHER SYMPTOMS AND SIGNS CONCERNING FOOD AND FLUID INTAKE: ICD-10-CM

## 2021-12-20 DIAGNOSIS — M48.061 SPINAL STENOSIS, LUMBAR REGION WITHOUT NEUROGENIC CLAUDICATION: ICD-10-CM

## 2021-12-20 DIAGNOSIS — G93.41 METABOLIC ENCEPHALOPATHY: ICD-10-CM

## 2021-12-20 DIAGNOSIS — E87.0 HYPEROSMOLALITY AND HYPERNATREMIA: ICD-10-CM

## 2021-12-20 DIAGNOSIS — E43 UNSPECIFIED SEVERE PROTEIN-CALORIE MALNUTRITION: ICD-10-CM

## 2021-12-20 DIAGNOSIS — B96.20 UNSPECIFIED ESCHERICHIA COLI [E. COLI] AS THE CAUSE OF DISEASES CLASSIFIED ELSEWHERE: ICD-10-CM

## 2021-12-20 DIAGNOSIS — R45.1 RESTLESSNESS AND AGITATION: ICD-10-CM

## 2021-12-20 DIAGNOSIS — E78.5 HYPERLIPIDEMIA, UNSPECIFIED: ICD-10-CM

## 2022-03-04 ENCOUNTER — EMERGENCY (EMERGENCY)
Facility: HOSPITAL | Age: 87
LOS: 0 days | Discharge: HOME | End: 2022-03-04
Attending: EMERGENCY MEDICINE
Payer: MEDICARE

## 2022-03-04 VITALS
HEIGHT: 65 IN | OXYGEN SATURATION: 97 % | TEMPERATURE: 98 F | HEART RATE: 75 BPM | SYSTOLIC BLOOD PRESSURE: 123 MMHG | DIASTOLIC BLOOD PRESSURE: 76 MMHG | RESPIRATION RATE: 17 BRPM | WEIGHT: 145.06 LBS

## 2022-03-04 DIAGNOSIS — R07.9 CHEST PAIN, UNSPECIFIED: ICD-10-CM

## 2022-03-04 DIAGNOSIS — Z95.5 PRESENCE OF CORONARY ANGIOPLASTY IMPLANT AND GRAFT: ICD-10-CM

## 2022-03-04 DIAGNOSIS — Z88.8 ALLERGY STATUS TO OTHER DRUGS, MEDICAMENTS AND BIOLOGICAL SUBSTANCES STATUS: ICD-10-CM

## 2022-03-04 DIAGNOSIS — E78.5 HYPERLIPIDEMIA, UNSPECIFIED: ICD-10-CM

## 2022-03-04 DIAGNOSIS — I25.10 ATHEROSCLEROTIC HEART DISEASE OF NATIVE CORONARY ARTERY WITHOUT ANGINA PECTORIS: ICD-10-CM

## 2022-03-04 DIAGNOSIS — Z79.02 LONG TERM (CURRENT) USE OF ANTITHROMBOTICS/ANTIPLATELETS: ICD-10-CM

## 2022-03-04 DIAGNOSIS — Z20.822 CONTACT WITH AND (SUSPECTED) EXPOSURE TO COVID-19: ICD-10-CM

## 2022-03-04 LAB
ALBUMIN SERPL ELPH-MCNC: 3.2 G/DL — LOW (ref 3.3–5)
ALP SERPL-CCNC: 137 U/L — HIGH (ref 40–120)
ALT FLD-CCNC: 17 U/L — SIGNIFICANT CHANGE UP (ref 12–78)
ANION GAP SERPL CALC-SCNC: 4 MMOL/L — LOW (ref 5–17)
APTT BLD: 33.7 SEC — SIGNIFICANT CHANGE UP (ref 27.5–35.5)
AST SERPL-CCNC: 18 U/L — SIGNIFICANT CHANGE UP (ref 15–37)
BASOPHILS # BLD AUTO: 0.02 K/UL — SIGNIFICANT CHANGE UP (ref 0–0.2)
BASOPHILS NFR BLD AUTO: 0.3 % — SIGNIFICANT CHANGE UP (ref 0–2)
BILIRUB SERPL-MCNC: 0.3 MG/DL — SIGNIFICANT CHANGE UP (ref 0.2–1.2)
BUN SERPL-MCNC: 15 MG/DL — SIGNIFICANT CHANGE UP (ref 7–23)
CALCIUM SERPL-MCNC: 9.1 MG/DL — SIGNIFICANT CHANGE UP (ref 8.5–10.1)
CHLORIDE SERPL-SCNC: 111 MMOL/L — HIGH (ref 96–108)
CO2 SERPL-SCNC: 29 MMOL/L — SIGNIFICANT CHANGE UP (ref 22–31)
CREAT SERPL-MCNC: 1.17 MG/DL — SIGNIFICANT CHANGE UP (ref 0.5–1.3)
EGFR: 61 ML/MIN/1.73M2 — SIGNIFICANT CHANGE UP
EOSINOPHIL # BLD AUTO: 0.14 K/UL — SIGNIFICANT CHANGE UP (ref 0–0.5)
EOSINOPHIL NFR BLD AUTO: 2.4 % — SIGNIFICANT CHANGE UP (ref 0–6)
FLUAV AG NPH QL: SIGNIFICANT CHANGE UP
FLUBV AG NPH QL: SIGNIFICANT CHANGE UP
GLUCOSE SERPL-MCNC: 85 MG/DL — SIGNIFICANT CHANGE UP (ref 70–99)
HCT VFR BLD CALC: 40.3 % — SIGNIFICANT CHANGE UP (ref 39–50)
HGB BLD-MCNC: 12.8 G/DL — LOW (ref 13–17)
IMM GRANULOCYTES NFR BLD AUTO: 0.2 % — SIGNIFICANT CHANGE UP (ref 0–1.5)
INR BLD: 1.02 RATIO — SIGNIFICANT CHANGE UP (ref 0.88–1.16)
LIDOCAIN IGE QN: 137 U/L — SIGNIFICANT CHANGE UP (ref 73–393)
LYMPHOCYTES # BLD AUTO: 2.1 K/UL — SIGNIFICANT CHANGE UP (ref 1–3.3)
LYMPHOCYTES # BLD AUTO: 35.7 % — SIGNIFICANT CHANGE UP (ref 13–44)
MCHC RBC-ENTMCNC: 29.8 PG — SIGNIFICANT CHANGE UP (ref 27–34)
MCHC RBC-ENTMCNC: 31.8 G/DL — LOW (ref 32–36)
MCV RBC AUTO: 93.7 FL — SIGNIFICANT CHANGE UP (ref 80–100)
MONOCYTES # BLD AUTO: 0.43 K/UL — SIGNIFICANT CHANGE UP (ref 0–0.9)
MONOCYTES NFR BLD AUTO: 7.3 % — SIGNIFICANT CHANGE UP (ref 2–14)
NEUTROPHILS # BLD AUTO: 3.18 K/UL — SIGNIFICANT CHANGE UP (ref 1.8–7.4)
NEUTROPHILS NFR BLD AUTO: 54.1 % — SIGNIFICANT CHANGE UP (ref 43–77)
NRBC # BLD: 0 /100 WBCS — SIGNIFICANT CHANGE UP (ref 0–0)
NT-PROBNP SERPL-SCNC: 384 PG/ML — SIGNIFICANT CHANGE UP (ref 0–450)
PLATELET # BLD AUTO: 215 K/UL — SIGNIFICANT CHANGE UP (ref 150–400)
POTASSIUM SERPL-MCNC: 3.7 MMOL/L — SIGNIFICANT CHANGE UP (ref 3.5–5.3)
POTASSIUM SERPL-SCNC: 3.7 MMOL/L — SIGNIFICANT CHANGE UP (ref 3.5–5.3)
PROT SERPL-MCNC: 7.3 GM/DL — SIGNIFICANT CHANGE UP (ref 6–8.3)
PROTHROM AB SERPL-ACNC: 12.2 SEC — SIGNIFICANT CHANGE UP (ref 10.5–13.4)
RBC # BLD: 4.3 M/UL — SIGNIFICANT CHANGE UP (ref 4.2–5.8)
RBC # FLD: 16.5 % — HIGH (ref 10.3–14.5)
SARS-COV-2 RNA SPEC QL NAA+PROBE: SIGNIFICANT CHANGE UP
SODIUM SERPL-SCNC: 144 MMOL/L — SIGNIFICANT CHANGE UP (ref 135–145)
TROPONIN I, HIGH SENSITIVITY RESULT: 16.2 NG/L — SIGNIFICANT CHANGE UP
WBC # BLD: 5.88 K/UL — SIGNIFICANT CHANGE UP (ref 3.8–10.5)
WBC # FLD AUTO: 5.88 K/UL — SIGNIFICANT CHANGE UP (ref 3.8–10.5)

## 2022-03-04 PROCEDURE — 93010 ELECTROCARDIOGRAM REPORT: CPT

## 2022-03-04 PROCEDURE — 99285 EMERGENCY DEPT VISIT HI MDM: CPT

## 2022-03-04 PROCEDURE — 71045 X-RAY EXAM CHEST 1 VIEW: CPT | Mod: 26

## 2022-03-04 RX ORDER — METOPROLOL TARTRATE 50 MG
0.5 TABLET ORAL
Qty: 0 | Refills: 0 | DISCHARGE

## 2022-03-04 RX ORDER — CLOPIDOGREL BISULFATE 75 MG/1
1 TABLET, FILM COATED ORAL
Qty: 0 | Refills: 0 | DISCHARGE

## 2022-03-04 RX ORDER — RANOLAZINE 500 MG/1
1 TABLET, FILM COATED, EXTENDED RELEASE ORAL
Qty: 0 | Refills: 0 | DISCHARGE

## 2022-03-04 RX ORDER — ISOSORBIDE MONONITRATE 60 MG/1
1 TABLET, EXTENDED RELEASE ORAL
Qty: 0 | Refills: 0 | DISCHARGE

## 2022-03-04 RX ORDER — ATORVASTATIN CALCIUM 80 MG/1
1 TABLET, FILM COATED ORAL
Qty: 0 | Refills: 0 | DISCHARGE

## 2022-03-04 NOTE — ED ADULT NURSE NOTE - NSIMPLEMENTINTERV_GEN_ALL_ED
Implemented All Fall with Harm Risk Interventions:  [place] to call system. Call bell, personal items and telephone within reach. Instruct patient to call for assistance. Room bathroom lighting operational. Non-slip footwear when patient is off stretcher. Physically safe environment: no spills, clutter or unnecessary equipment. Stretcher in lowest position, wheels locked, appropriate side rails in place. Provide visual cue, wrist band, yellow gown, etc. Monitor gait and stability. Monitor for mental status changes and reorient to person, place, and time. Review medications for side effects contributing to fall risk. Reinforce activity limits and safety measures with patient and family. Provide visual clues: red socks.

## 2022-03-04 NOTE — ED PROVIDER NOTE - CLINICAL SUMMARY MEDICAL DECISION MAKING FREE TEXT BOX
Ddx: ro ACS/ no sob to suggest PE, no tearing pain to suggest dissection  Plan: Cbc, cmp, troponin, ecg, cxr, possible admit, reassess

## 2022-03-04 NOTE — ED PROVIDER NOTE - OBJECTIVE STATEMENT
Pt is a 87 yo gentleman with a pmhx of HTN, HL, CAD w stent. Had stress test 2019 which was negative. Had an episode of chest pain lasting 1 minute last night, and then another episode this morning, also lasting one minute and now resolved. No sob, no chest pain now, pain was 3/10. No cough, no fever, no hx of dvt/pe. Has been compliant with his medications including plavix. Comfortable now.

## 2022-03-04 NOTE — ED ADULT NURSE NOTE - NSICDXPASTMEDICALHX_GEN_ALL_CORE_FT
PAST MEDICAL HISTORY:  CAD (coronary artery disease)     Hyperlipidemia     Stented coronary artery

## 2022-03-04 NOTE — ED ADULT TRIAGE NOTE - CHIEF COMPLAINT QUOTE
Patient c/o left sided chest pains which started under his armpit yesterday. h/o  cardiac stent placement 5 years go.

## 2022-03-04 NOTE — ED PROVIDER NOTE - PATIENT PORTAL LINK FT
You can access the FollowMyHealth Patient Portal offered by [facility] by registering at the following website: [email or web address]. By joining [employer]’s FollowMyHealth portal, you will also be able to view your health information using other applications (apps) compatible with our system.

## 2022-03-04 NOTE — ED PROVIDER NOTE - NSDCPRINTRESULTS_ED_ALL_ED
Patient requests all Lab, Cardiology, and Radiology Results on their Discharge Instructions
details…

## 2022-03-04 NOTE — ED ADULT NURSE NOTE - OBJECTIVE STATEMENT
received er bed 22 c/o pain under l axilla last night which resolved without treatement and episode of l chest pain x 3-4 mons this morning which resolved spontaneously denies any pain at present denies shortness of breath

## 2022-03-04 NOTE — ED PROVIDER NOTE - PROGRESS NOTE DETAILS
Pt has remained chest pain free. Labs wnl, and trop negative. Pt offered admission, but declines. Dr. Vila his cardiologist was called, and agrees patient can be discharged, and will see him in 3 days at his office outpatient. Patient and family agrees with plan, and strict return precautions provided. Patient ready for d/c.

## 2022-03-08 NOTE — DIETITIAN INITIAL EVALUATION ADULT. - WEIGHT IN KG
Doxepin Pregnancy And Lactation Text: This medication is Pregnancy Category C and it isn't known if it is safe during pregnancy. It is also excreted in breast milk and breast feeding isn't recommended. 70

## 2022-03-11 NOTE — DISCHARGE NOTE PROVIDER - NSDCHOSPICE_GEN_A_CORE
No
34 year old male with no pmhx presents with laceration to back of scalp. Pt was using resistance bands while working out, and it snapped and hit the back of his head. no loc or headache.

## 2022-05-10 PROBLEM — Z95.5 PRESENCE OF CORONARY ANGIOPLASTY IMPLANT AND GRAFT: Chronic | Status: ACTIVE | Noted: 2022-03-04

## 2022-05-10 PROBLEM — I25.10 ATHEROSCLEROTIC HEART DISEASE OF NATIVE CORONARY ARTERY WITHOUT ANGINA PECTORIS: Chronic | Status: ACTIVE | Noted: 2022-03-04

## 2022-05-24 ENCOUNTER — APPOINTMENT (OUTPATIENT)
Dept: UROLOGY | Facility: CLINIC | Age: 87
End: 2022-05-24
Payer: MEDICARE

## 2022-05-24 ENCOUNTER — LABORATORY RESULT (OUTPATIENT)
Age: 87
End: 2022-05-24

## 2022-05-24 VITALS
DIASTOLIC BLOOD PRESSURE: 63 MMHG | SYSTOLIC BLOOD PRESSURE: 119 MMHG | OXYGEN SATURATION: 95 % | HEART RATE: 64 BPM | WEIGHT: 143 LBS | BODY MASS INDEX: 24.41 KG/M2 | TEMPERATURE: 97.2 F | HEIGHT: 64 IN

## 2022-05-24 DIAGNOSIS — Z63.4 DISAPPEARANCE AND DEATH OF FAMILY MEMBER: ICD-10-CM

## 2022-05-24 DIAGNOSIS — Z95.828 PRESENCE OF OTHER VASCULAR IMPLANTS AND GRAFTS: ICD-10-CM

## 2022-05-24 DIAGNOSIS — Z80.41 FAMILY HISTORY OF MALIGNANT NEOPLASM OF OVARY: ICD-10-CM

## 2022-05-24 DIAGNOSIS — Z80.0 FAMILY HISTORY OF MALIGNANT NEOPLASM OF DIGESTIVE ORGANS: ICD-10-CM

## 2022-05-24 PROCEDURE — 99205 OFFICE O/P NEW HI 60 MIN: CPT

## 2022-05-24 RX ORDER — ATORVASTATIN CALCIUM 80 MG/1
TABLET, FILM COATED ORAL
Refills: 0 | Status: ACTIVE | COMMUNITY

## 2022-05-24 SDOH — SOCIAL STABILITY - SOCIAL INSECURITY: DISSAPEARANCE AND DEATH OF FAMILY MEMBER: Z63.4

## 2022-05-24 NOTE — HISTORY OF PRESENT ILLNESS
[FreeTextEntry1] : 05/24/22: Pt. is here for frequent urination. N x every hour. It has been going on for the last six months. Pt has urgency and the flow is good. No hematuria or burning sensation. Will get UA and CX today. \par \par Pt has h/o CA prostate , s/p Radical prostatectomy 2007. No radiation. NO hematuria. \par PVR: insignificant. WIll get UA, culture\par \par RTC 2 weeks for uroflow, bladder scan PSA, UA and culture\par \par

## 2022-05-24 NOTE — PHYSICAL EXAM
[Penis Abnormality] : normal circumcised penis [General Appearance - Well Developed] : well developed [General Appearance - Well Nourished] : well nourished [Normal Appearance] : normal appearance [Well Groomed] : well groomed [General Appearance - In No Acute Distress] : no acute distress [Edema] : no peripheral edema [Respiration, Rhythm And Depth] : normal respiratory rhythm and effort [Exaggerated Use Of Accessory Muscles For Inspiration] : no accessory muscle use [Abdomen Soft] : soft [Abdomen Tenderness] : non-tender [Costovertebral Angle Tenderness] : no ~M costovertebral angle tenderness [Urethral Meatus] : meatus normal [Urinary Bladder Findings] : the bladder was normal on palpation [Scrotum] : the scrotum was normal [Epididymis] : the epididymides were normal [Testes Tenderness] : no tenderness of the testes [Testes Mass (___cm)] : there were no testicular masses [No Prostate Nodules] : no prostate nodules [Normal Station and Gait] : the gait and station were normal for the patient's age [] : no rash [No Focal Deficits] : no focal deficits [Oriented To Time, Place, And Person] : oriented to person, place, and time [Affect] : the affect was normal [Mood] : the mood was normal [Not Anxious] : not anxious [No Palpable Adenopathy] : no palpable adenopathy [FreeTextEntry1] : hydrocele : bilateral

## 2022-05-24 NOTE — LETTER BODY
[Consult Letter:] : I had the pleasure of evaluating your patient, [unfilled]. [Please see my note below.] : Please see my note below. [Consult Closing:] : Thank you very much for allowing me to participate in the care of this patient.  If you have any questions, please do not hesitate to contact me. [Sincerely,] : Sincerely, [Dear  ___] : Dear  [unfilled], [FreeTextEntry3] : Abhijit Rinaldi MD\par

## 2022-05-24 NOTE — REVIEW OF SYSTEMS
[Feeling Tired] : feeling tired [Eyesight Problems] : eyesight problems [Dry Eyes] : dryness of the eyes [Earache] : earache [Constipation] : constipation [History of kidney stones] : history of kidney stones [Wake up at night to urinate  How many times?  ___] : wakes up to urinate [unfilled] times during the night [Joint Pain] : joint pain [Difficulty Walking] : difficulty walking [Negative] : Heme/Lymph [see HPI] : see HPI [Pain during urination] : denies pain during urination

## 2022-05-25 LAB
APPEARANCE: ABNORMAL
BILIRUBIN URINE: NEGATIVE
BLOOD URINE: ABNORMAL
COLOR: YELLOW
GLUCOSE QUALITATIVE U: NEGATIVE
KETONES URINE: NEGATIVE
LEUKOCYTE ESTERASE URINE: ABNORMAL
NITRITE URINE: NEGATIVE
PH URINE: 7.5
PROTEIN URINE: ABNORMAL
SPECIFIC GRAVITY URINE: 1.01
UROBILINOGEN URINE: NORMAL

## 2022-05-31 ENCOUNTER — NON-APPOINTMENT (OUTPATIENT)
Age: 87
End: 2022-05-31

## 2022-05-31 LAB — BACTERIA UR CULT: ABNORMAL

## 2022-06-08 ENCOUNTER — NON-APPOINTMENT (OUTPATIENT)
Age: 87
End: 2022-06-08

## 2022-06-23 ENCOUNTER — APPOINTMENT (OUTPATIENT)
Dept: UROLOGY | Facility: CLINIC | Age: 87
End: 2022-06-23
Payer: MEDICARE

## 2022-06-23 VITALS
HEART RATE: 62 BPM | OXYGEN SATURATION: 98 % | SYSTOLIC BLOOD PRESSURE: 135 MMHG | DIASTOLIC BLOOD PRESSURE: 69 MMHG | TEMPERATURE: 98.2 F

## 2022-06-23 DIAGNOSIS — I10 ESSENTIAL (PRIMARY) HYPERTENSION: ICD-10-CM

## 2022-06-23 DIAGNOSIS — E78.00 PURE HYPERCHOLESTEROLEMIA, UNSPECIFIED: ICD-10-CM

## 2022-06-23 DIAGNOSIS — N39.0 URINARY TRACT INFECTION, SITE NOT SPECIFIED: ICD-10-CM

## 2022-06-23 PROCEDURE — 51741 ELECTRO-UROFLOWMETRY FIRST: CPT

## 2022-06-23 PROCEDURE — 51798 US URINE CAPACITY MEASURE: CPT

## 2022-06-23 PROCEDURE — 99213 OFFICE O/P EST LOW 20 MIN: CPT | Mod: 25

## 2022-06-23 RX ORDER — ALFUZOSIN HYDROCHLORIDE 10 MG/1
10 TABLET, EXTENDED RELEASE ORAL
Refills: 0 | Status: ACTIVE | COMMUNITY
Start: 2022-04-12

## 2022-06-23 RX ORDER — ISOSORBIDE MONONITRATE 20 MG/1
TABLET ORAL
Refills: 0 | Status: DISCONTINUED | COMMUNITY
End: 2022-06-23

## 2022-06-23 RX ORDER — METOPROLOL SUCCINATE 25 MG/1
25 TABLET, EXTENDED RELEASE ORAL
Refills: 0 | Status: ACTIVE | COMMUNITY
Start: 2021-06-24

## 2022-06-23 RX ORDER — ISOSORBIDE MONONITRATE 30 MG/1
30 TABLET, EXTENDED RELEASE ORAL
Refills: 0 | Status: ACTIVE | COMMUNITY
Start: 2022-04-12

## 2022-06-23 RX ORDER — CIPROFLOXACIN HYDROCHLORIDE 500 MG/1
500 TABLET, FILM COATED ORAL
Qty: 14 | Refills: 1 | Status: DISCONTINUED | COMMUNITY
Start: 2022-05-31 | End: 2022-06-23

## 2022-06-23 RX ORDER — FLUTICASONE PROPIONATE 50 UG/1
50 SPRAY, METERED NASAL
Qty: 16 | Refills: 0 | Status: ACTIVE | COMMUNITY
Start: 2022-04-12

## 2022-06-23 NOTE — LETTER BODY
[Dear  ___] : Dear  [unfilled], [Consult Letter:] : I had the pleasure of evaluating your patient, [unfilled]. [Please see my note below.] : Please see my note below. [Consult Closing:] : Thank you very much for allowing me to participate in the care of this patient.  If you have any questions, please do not hesitate to contact me. [Sincerely,] : Sincerely, [FreeTextEntry3] : Abhijit Rinaldi MD\par

## 2022-06-23 NOTE — PHYSICAL EXAM

## 2022-06-23 NOTE — REVIEW OF SYSTEMS
[Feeling Tired] : feeling tired [Eyesight Problems] : eyesight problems [Dry Eyes] : dryness of the eyes [Earache] : earache [Constipation] : constipation [see HPI] : see HPI [History of kidney stones] : history of kidney stones [Wake up at night to urinate  How many times?  ___] : wakes up to urinate [unfilled] times during the night [Joint Pain] : joint pain [Difficulty Walking] : difficulty walking [Negative] : Heme/Lymph [Pain during urination] : denies pain during urination

## 2022-06-23 NOTE — ED ADULT NURSE NOTE - NSIMPLEMENTINTERV_GEN_ALL_ED
No new care gaps identified.  [facility] Embedded Care Gaps. Reference number: [id number]. 6/23/2022   8:08:38 AM ANGELEST   Implemented All Fall Risk Interventions:  [place] to call system. Call bell, personal items and telephone within reach. Instruct patient to call for assistance. Room bathroom lighting operational. Non-slip footwear when patient is off stretcher. Physically safe environment: no spills, clutter or unnecessary equipment. Stretcher in lowest position, wheels locked, appropriate side rails in place. Provide visual cue, wrist band, yellow gown, etc. Monitor gait and stability. Monitor for mental status changes and reorient to person, place, and time. Review medications for side effects contributing to fall risk. Reinforce activity limits and safety measures with patient and family.

## 2022-06-23 NOTE — HISTORY OF PRESENT ILLNESS
[FreeTextEntry1] : 05/24/22: Pt. is here for frequent urination. N x every hour. It has been going on for the last six months. Pt has urgency and the flow is good. No hematuria or burning sensation. Will get UA and CX today. \par \par Pt has h/o CA prostate , s/p Radical prostatectomy 2007. No radiation. NO hematuria. \par PVR: insignificant. WIll get UA, culture\par \par RTC 2 weeks for uroflow, bladder scan PSA, UA and culture\par \par 06/23/2022: Mr. HICKS is a 86 year male who presents today for a follow up for frequent urination. Had symptomatic UTI , treated with Cipro. Today for follow up. Frequency and urgency improved. Will repeat UA and culture. Mild urgency still present.  \par Patient is voiding and emptying bladder well. Urinary stream and flow is good. PVR: 81  N x 3 day time q3hr\par \par \par Pt. has h/o CA prostate, s/p Radical prostatectomy 2007. No radiation. NO hematuria. Will drwa PSA today.\par \par RTC: 3 months for Follow up: PSA, UA, culture, uroflow and bladder scan\par \par \par

## 2022-06-26 LAB
APPEARANCE: CLEAR
BACTERIA UR CULT: NORMAL
BILIRUBIN URINE: NEGATIVE
BLOOD URINE: NEGATIVE
COLOR: YELLOW
GLUCOSE QUALITATIVE U: NEGATIVE
KETONES URINE: NEGATIVE
LEUKOCYTE ESTERASE URINE: NEGATIVE
NITRITE URINE: NEGATIVE
PH URINE: 7
PROTEIN URINE: NEGATIVE
PSA SERPL-MCNC: 0.62 NG/ML
SPECIFIC GRAVITY URINE: 1.02
UROBILINOGEN URINE: NORMAL

## 2022-07-03 ENCOUNTER — EMERGENCY (EMERGENCY)
Facility: HOSPITAL | Age: 87
LOS: 0 days | Discharge: HOME | End: 2022-07-03
Attending: STUDENT IN AN ORGANIZED HEALTH CARE EDUCATION/TRAINING PROGRAM

## 2022-07-03 VITALS
SYSTOLIC BLOOD PRESSURE: 119 MMHG | HEART RATE: 72 BPM | DIASTOLIC BLOOD PRESSURE: 64 MMHG | OXYGEN SATURATION: 97 % | HEIGHT: 65 IN | RESPIRATION RATE: 17 BRPM | TEMPERATURE: 98 F | WEIGHT: 149.91 LBS

## 2022-07-03 VITALS
DIASTOLIC BLOOD PRESSURE: 78 MMHG | RESPIRATION RATE: 17 BRPM | OXYGEN SATURATION: 100 % | SYSTOLIC BLOOD PRESSURE: 150 MMHG | HEART RATE: 72 BPM | TEMPERATURE: 98 F

## 2022-07-03 DIAGNOSIS — U07.1 COVID-19: ICD-10-CM

## 2022-07-03 DIAGNOSIS — I25.10 ATHEROSCLEROTIC HEART DISEASE OF NATIVE CORONARY ARTERY WITHOUT ANGINA PECTORIS: ICD-10-CM

## 2022-07-03 DIAGNOSIS — Z79.02 LONG TERM (CURRENT) USE OF ANTITHROMBOTICS/ANTIPLATELETS: ICD-10-CM

## 2022-07-03 DIAGNOSIS — R53.1 WEAKNESS: ICD-10-CM

## 2022-07-03 DIAGNOSIS — N17.9 ACUTE KIDNEY FAILURE, UNSPECIFIED: ICD-10-CM

## 2022-07-03 DIAGNOSIS — Z88.8 ALLERGY STATUS TO OTHER DRUGS, MEDICAMENTS AND BIOLOGICAL SUBSTANCES STATUS: ICD-10-CM

## 2022-07-03 DIAGNOSIS — I10 ESSENTIAL (PRIMARY) HYPERTENSION: ICD-10-CM

## 2022-07-03 LAB
ALBUMIN SERPL ELPH-MCNC: 3.5 G/DL — SIGNIFICANT CHANGE UP (ref 3.3–5)
ALP SERPL-CCNC: 129 U/L — HIGH (ref 40–120)
ALT FLD-CCNC: 17 U/L — SIGNIFICANT CHANGE UP (ref 12–78)
ANION GAP SERPL CALC-SCNC: 7 MMOL/L — SIGNIFICANT CHANGE UP (ref 5–17)
APPEARANCE UR: CLEAR — SIGNIFICANT CHANGE UP
AST SERPL-CCNC: 33 U/L — SIGNIFICANT CHANGE UP (ref 15–37)
BACTERIA # UR AUTO: ABNORMAL
BILIRUB SERPL-MCNC: 0.8 MG/DL — SIGNIFICANT CHANGE UP (ref 0.2–1.2)
BILIRUB UR-MCNC: NEGATIVE — SIGNIFICANT CHANGE UP
BUN SERPL-MCNC: 16 MG/DL — SIGNIFICANT CHANGE UP (ref 7–23)
CALCIUM SERPL-MCNC: 9.3 MG/DL — SIGNIFICANT CHANGE UP (ref 8.5–10.1)
CHLORIDE SERPL-SCNC: 109 MMOL/L — HIGH (ref 96–108)
CO2 SERPL-SCNC: 23 MMOL/L — SIGNIFICANT CHANGE UP (ref 22–31)
COLOR SPEC: YELLOW — SIGNIFICANT CHANGE UP
CREAT SERPL-MCNC: 1.37 MG/DL — HIGH (ref 0.5–1.3)
DIFF PNL FLD: ABNORMAL
EGFR: 50 ML/MIN/1.73M2 — LOW
FLUAV AG NPH QL: SIGNIFICANT CHANGE UP
FLUBV AG NPH QL: SIGNIFICANT CHANGE UP
GLUCOSE SERPL-MCNC: 83 MG/DL — SIGNIFICANT CHANGE UP (ref 70–99)
GLUCOSE UR QL: NEGATIVE MG/DL — SIGNIFICANT CHANGE UP
HCT VFR BLD CALC: 40.4 % — SIGNIFICANT CHANGE UP (ref 39–50)
HGB BLD-MCNC: 13 G/DL — SIGNIFICANT CHANGE UP (ref 13–17)
KETONES UR-MCNC: NEGATIVE — SIGNIFICANT CHANGE UP
LEUKOCYTE ESTERASE UR-ACNC: NEGATIVE — SIGNIFICANT CHANGE UP
MAGNESIUM SERPL-MCNC: 2.4 MG/DL — SIGNIFICANT CHANGE UP (ref 1.6–2.6)
MCHC RBC-ENTMCNC: 29.1 PG — SIGNIFICANT CHANGE UP (ref 27–34)
MCHC RBC-ENTMCNC: 32.2 G/DL — SIGNIFICANT CHANGE UP (ref 32–36)
MCV RBC AUTO: 90.6 FL — SIGNIFICANT CHANGE UP (ref 80–100)
NITRITE UR-MCNC: NEGATIVE — SIGNIFICANT CHANGE UP
NRBC # BLD: 0 /100 WBCS — SIGNIFICANT CHANGE UP (ref 0–0)
PH UR: 6.5 — SIGNIFICANT CHANGE UP (ref 5–8)
PLATELET # BLD AUTO: 165 K/UL — SIGNIFICANT CHANGE UP (ref 150–400)
POTASSIUM SERPL-MCNC: 4.7 MMOL/L — SIGNIFICANT CHANGE UP (ref 3.5–5.3)
POTASSIUM SERPL-SCNC: 4.7 MMOL/L — SIGNIFICANT CHANGE UP (ref 3.5–5.3)
PROT SERPL-MCNC: 7.3 GM/DL — SIGNIFICANT CHANGE UP (ref 6–8.3)
PROT UR-MCNC: NEGATIVE MG/DL — SIGNIFICANT CHANGE UP
RBC # BLD: 4.46 M/UL — SIGNIFICANT CHANGE UP (ref 4.2–5.8)
RBC # FLD: 16.1 % — HIGH (ref 10.3–14.5)
RBC CASTS # UR COMP ASSIST: ABNORMAL /HPF (ref 0–4)
SARS-COV-2 RNA SPEC QL NAA+PROBE: DETECTED
SODIUM SERPL-SCNC: 139 MMOL/L — SIGNIFICANT CHANGE UP (ref 135–145)
SP GR SPEC: 1 — LOW (ref 1.01–1.02)
UROBILINOGEN FLD QL: NEGATIVE MG/DL — SIGNIFICANT CHANGE UP
WBC # BLD: 6.02 K/UL — SIGNIFICANT CHANGE UP (ref 3.8–10.5)
WBC # FLD AUTO: 6.02 K/UL — SIGNIFICANT CHANGE UP (ref 3.8–10.5)
WBC UR QL: NEGATIVE — SIGNIFICANT CHANGE UP

## 2022-07-03 PROCEDURE — 70450 CT HEAD/BRAIN W/O DYE: CPT | Mod: 26,MA

## 2022-07-03 PROCEDURE — 99285 EMERGENCY DEPT VISIT HI MDM: CPT

## 2022-07-03 PROCEDURE — 93010 ELECTROCARDIOGRAM REPORT: CPT

## 2022-07-03 RX ORDER — SODIUM CHLORIDE 9 MG/ML
1000 INJECTION INTRAMUSCULAR; INTRAVENOUS; SUBCUTANEOUS ONCE
Refills: 0 | Status: COMPLETED | OUTPATIENT
Start: 2022-07-03 | End: 2022-07-03

## 2022-07-03 RX ADMIN — SODIUM CHLORIDE 1000 MILLILITER(S): 9 INJECTION INTRAMUSCULAR; INTRAVENOUS; SUBCUTANEOUS at 09:40

## 2022-07-03 NOTE — ED ADULT TRIAGE NOTE - GLASGOW COMA SCALE: BEST VERBAL RESPONSE, MLM
Central Prior Authorization Team   Phone: [phone]      [place] Initiation    Medication: Movantik 25mg-Initiated  Insurance Company: [facility] - Phone [phone] Fax [phone]  Pharmacy [place] the Rx[place] [facility] [place] [place] [address] [place] [address]  Filling Pharmacy Phone: [phone]  Filling Pharmacy Fax:    Start Date: 10/23/2018        
Please do prior authorization .  
Prior Authorization Approval    Authorization Effective Date: 10/23/2018  Authorization Expiration Date: 10/23/2019  Medication: Movantik 25mg-APPROVED  Approved Dose/Quantity:   Reference #:     Insurance Company: [employer] - Phone [phone] Fax [phone]  Expected CoPay:       CoPay Card Available:      Foundation Assistance Needed:    Which Pharmacy is filling the prescription (Not needed for infusion/clinic administered): [facility] [place], [place] - [address] [place] [address]  Pharmacy Notified: Yes  Patient Notified: No    Pharmacy will notify patient when medication is ready.    
Prior Authorization Retail Medication Request    Medication/Dose: Movantik 25mg  ICD code (if different than what is on RX):    Previously Tried and Failed:    Rationale:      Insurance Name:  [facility]  Insurance ID:  [id number]      Pharmacy Information (if different than what is on RX)  Name:  [facility]  Phone:  [phone]    Thank you,  Stefanie Murray, [facility]      
(V5) oriented

## 2022-07-03 NOTE — ED PROVIDER NOTE - OBJECTIVE STATEMENT
86m hx htn, cad on plavix. felt weak last 2 days. lost his ablance while coming down the steps, fell 3 steps, denies striking his head, only saying he might have bumped his head. no loc. ambulating without issue since fall urinary frequency for 1 year, no change recently.

## 2022-07-03 NOTE — ED PROVIDER NOTE - CLINICAL SUMMARY MEDICAL DECISION MAKING FREE TEXT BOX
well appearing, neurologically intact, mild yee, ivf provided ua unremarkable. ct unremarkable. ambulating without issue in ED. will dc. retunr precautions given.

## 2022-07-03 NOTE — ED ADULT NURSE NOTE - OBJECTIVE STATEMENT
Received 85yo male AAOx4 s/p fall as per daughter. Pt denies LOC. Pt stated " I only stumble". No visible injuries noted. No labored breathing. No s/s acute distress noted, will cont' to monitor.

## 2022-07-03 NOTE — ED ADULT TRIAGE NOTE - CHIEF COMPLAINT QUOTE
As per patients daughter patient c/o weakness, unsteady gate, fell down stares, fall was unwitnessed, pt reports falling down 3 steps, denies pain. Patient had positive at home covid test today.  Hx: Cardiac Stents, HTN. Pt on plavix.

## 2022-07-03 NOTE — ED ADULT NURSE NOTE - INTERVENTIONS DEFINITIONS
[place] to call system/Non-slip footwear when patient is off stretcher/Physically safe environment: no spills, clutter or unnecessary equipment/Provide visual clues: red socks

## 2022-07-03 NOTE — ED PROVIDER NOTE - PATIENT PORTAL LINK FT
You can access the FollowMyHealth Patient Portal offered by [facility] by registering at the following website: [email or web address]. By joining [employer]’s FollowMyHealth portal, you will also be able to view your health information using other applications (apps) compatible with our system.

## 2022-07-03 NOTE — ED PROVIDER NOTE - PHYSICAL EXAMINATION
General: Awake, alert and oriented. No acute distress. Well developed, hydrated and nourished. Appears stated age.   Skin: Skin in warm, dry and intact without rashes or lesions. Appropriate color for ethnicity  HENMT: head normocephalic and atraumatic; bilateral external ears without swelling. no nasal discharge. moist oral mucosa. supple neck, trachea midline  EYES: Conjunctiva clear. nonicteric sclera. EOM intact, Eyelids are normal in appearance without swelling or lesions.  Cardiac: well perfused  Respiratory: breathing comfortably on room air. no audible wheezing or stridor  Abdominal: nondistended, soft, nontender  MSK: Neck and back are without deformity, visible external skin changes, or signs of trauma. Curvature of the cervical, thoracic, and lumbar spine are within normal limits. no external signs of trauma. no apparent deficits in ROM of any extremity  Neurological: The patient is awake, alert and oriented to person, place, and time with normal speech. CN 2-12  intact. no apparent deficits. Memory is normal and thought process is intact. No gait abnormalities are appreciated. 5/5 strength and equal sensation in all 4 extremiites  Psychiatric: Appropriate mood and affect. Good judgement and insight. No visual or auditory hallucinations.

## 2022-07-04 LAB
CULTURE RESULTS: SIGNIFICANT CHANGE UP
SPECIMEN SOURCE: SIGNIFICANT CHANGE UP

## 2022-10-06 ENCOUNTER — APPOINTMENT (OUTPATIENT)
Dept: UROLOGY | Facility: CLINIC | Age: 87
End: 2022-10-06

## 2022-10-06 VITALS
SYSTOLIC BLOOD PRESSURE: 127 MMHG | HEART RATE: 81 BPM | OXYGEN SATURATION: 97 % | TEMPERATURE: 98.1 F | DIASTOLIC BLOOD PRESSURE: 64 MMHG

## 2022-10-06 DIAGNOSIS — C61 MALIGNANT NEOPLASM OF PROSTATE: ICD-10-CM

## 2022-10-06 DIAGNOSIS — R35.0 FREQUENCY OF MICTURITION: ICD-10-CM

## 2022-10-06 DIAGNOSIS — R35.1 NOCTURIA: ICD-10-CM

## 2022-10-06 PROCEDURE — 99214 OFFICE O/P EST MOD 30 MIN: CPT | Mod: 25

## 2022-10-06 PROCEDURE — 51741 ELECTRO-UROFLOWMETRY FIRST: CPT

## 2022-10-06 PROCEDURE — 51798 US URINE CAPACITY MEASURE: CPT

## 2022-10-10 PROBLEM — R35.0 FREQUENCY OF URINATION: Status: ACTIVE | Noted: 2022-05-24

## 2022-10-10 PROBLEM — R35.1 NOCTURIA: Status: ACTIVE | Noted: 2022-10-06

## 2022-10-10 PROBLEM — C61 CA PROSTATE, ADENOCA: Status: ACTIVE | Noted: 2022-05-24

## 2022-10-10 LAB
APPEARANCE: CLEAR
BILIRUBIN URINE: NEGATIVE
BLOOD URINE: NEGATIVE
COLOR: NORMAL
GLUCOSE QUALITATIVE U: NEGATIVE
KETONES URINE: NEGATIVE
LEUKOCYTE ESTERASE URINE: NEGATIVE
NITRITE URINE: NEGATIVE
PH URINE: 7
PROTEIN URINE: NEGATIVE
SPECIFIC GRAVITY URINE: 1.01
UROBILINOGEN URINE: NORMAL

## 2022-10-10 NOTE — HISTORY OF PRESENT ILLNESS
[FreeTextEntry1] : 05/24/22: Pt. is here for frequent urination. N x every hour. It has been going on for the last six months. Pt has urgency and the flow is good. No hematuria or burning sensation. Will get UA and CX today. \par \par Pt has h/o CA prostate , s/p Radical prostatectomy 2007. No radiation. NO hematuria. \par PVR: insignificant. WIll get UA, culture\par \par RTC 2 weeks for uroflow, bladder scan PSA, UA and culture\par \par 06/23/2022: Mr. HICKS is a 86 year male who presents today for a follow up for frequent urination. Had symptomatic UTI , treated with Cipro. Today for follow up. Frequency and urgency improved. Will repeat UA and culture. Mild urgency still present.  \par Patient is voiding and emptying bladder well. Urinary stream and flow is good. PVR: 81  N x 3 day time q 3 hr\par \par \par Pt. has h/o CA prostate, s/p Radical prostatectomy 2007. No radiation. NO hematuria. Will draw PSA today.\par \par RTC: 3 months for Follow up: UA, culture, uroflow and bladder scan\par \par 10/06/2022: Voiding well on Alfuzosin. Voided small an]mount, at random PVR: 128 ml. Pt states that he voids much better at home. No intervention, will continue Alfuzosin. \par \par Ca Prostate: S/P radical prostatectomy: PSA stable. \par \par RTC: 6 months for Follow up: PSA, UA, culture, uroflow and bladder scan\par \par \par \par \par

## 2022-10-28 NOTE — PROGRESS NOTE ADULT - ASSESSMENT
COVID-19 PNA  -continue dexamethasone and remdesivir as patient requiring supplemental oxygen-continues to be on 2L  -Tylenol PRN fever  -isolation     Acute cystitis.   Continue ceftriaxone.   -awaiting cultures     BERTHA (acute kidney injury).   - CR 1.7 - unknown baseline  light iv hydration.    CAD (coronary artery disease).   -continue plavix.     Hyperlipidemia.   -continue lipitor.    Dementia  -CT of head neg for hemorrage/ CVA, only chronic ischemic changes    DVT prop: Lovenox 40 daily.    COVID-19 PNA  -started on dexamethasone and remdesivir as patient requiring supplemental oxygen-continues to be on 2L  -improved, will d/c treatments and observe   -Tylenol PRN fever  -isolation     Acute cystitis.   Continue ceftriaxone.   -awaiting cultures     BERTHA (acute kidney injury).   - CR 1.7 - unknown baseline  light iv hydration.    CAD (coronary artery disease).   -continue plavix.     Hyperlipidemia.   -continue lipitor.    Dementia  -CT of head neg for hemorrage/ CVA, only chronic ischemic changes    DVT prop: Lovenox 40 daily.    WIll discuss with CM --pt can hopefully be discharged.     COVID-19 PNA  -started on dexamethasone and remdesivir as patient requiring supplemental oxygen-continues to be on 2L  -improved,  -Tylenol PRN fever  -isolation     Acute cystitis.   discussed with ID- less likely UTI  will d/c ceftriaxone.   -awaiting cultures     BERTHA (acute kidney injury).   - CR 1.7 - unknown baseline  light iv hydration.    CAD (coronary artery disease).   -continue plavix.     Hyperlipidemia.   -continue lipitor.    Dementia  -CT of head neg for hemorrage/ CVA, only chronic ischemic changes    DVT prop: Lovenox 40 daily.    WIll discuss with CM --pt can hopefully be discharged tomorrow morning   no

## 2022-12-19 NOTE — ED ADULT NURSE NOTE - BREATHING, MLM
Ongoing SW/CM Assessment/Plan of Care Note     Discharge Destination: Hospice     Family's discharge planning goal: hospice  Discharge Services: Hospice at [facility] vs [facility] hospice  Prior Services: Visiting Cristiane 3hrs daily for assistance with IADLs  Baseline Level of Care: independent with ambulation and ADLs. Visiting Cristiane assisted with IADLs.   Transportation: ambulance  PCP: Citlaly Gaines MD   DME: to be provided by facility  Advanced Directives:  Activated 12/19; agent 1 James Harper 2 Jose Harper - 12/19 James requested dept follow up with secondary agent Jose regarding decisions.  COVID Test: negative 12/17  COVID Vaccine status: vaccinated  IMM/MOON: NA  Important Phone Numbers:     See SW/CM flowsheets for goals and other objective data.    Patient/Family discharge goal (s):  Goal #1: Palliative/Hospice planning  Goal #2: Discharge to other institution(s) arranged  Goal #3: Transportation arranged or issues addressed    PT Recommendation:          OT Recommendation:          SLP Recommendation:       Disposition:       Progress note:   Met with pt, rey Rojas and family at bedside. Discussed POAHC, pt has a will but Jose does not have a new POAHC for pt. Writer will contact 's office that did will to see if a new POA-HC was completed. Discussed that pending hospice consult nursing home referrals may need to be made and at that time nursing home application would need to be completed; application provided. Writer will follow up.    Call to 's office. Spoke with staff who stated pt did not complete new POA-HC.     Confirmed with hospice RN that pt does not qualify for [facility] hospice at this time.     Met with pt, catrachita Garcia and family at bedside. Updated Jose and family that pt did not update POA-HC and that primary agent is James at this time, reviewed that James can state that he wants Jose to answer questions. Reviewed pt does not qualify for [facility] hospice at this  time. Informed Jose that nursing home application needs to be completed and that anyone in the family can do this. Reviewed Writer will contact James regarding plans.    Call to catrachita Ramirez. Spoke with James regarding activated POA-HC. Discussed discharge plans and need for placement pending hospice re-evaluation tomorrow. James requested Writer speak with Jose and is agreeable what Jose decides.    Met with pt, rey Rojas and family members at bedside. Updated Jose on above. Discussed nursing home placement and choice offered, reviewed most facilities in [place] do not have beds available at this time. Jose prefers [facility] or [facility]. Jose will complete nursing home application.    Referrals made via Epic.     Collaborated with Stacie at [facility] and [facility]. [facility] able [facility] pt at discharge tomorrow pending hospice consult. Pt will transport via ambulance.    Faxed nursing home application to [facility].     Update [facility] regarding discharge plans and requested hospice assess for GIP in the morning tomorrow.     Met with pt, rey Rojas, and family at bedside. Informed them of acceptance to [facility] on hospice services pending hospice assessment for [facility] tomorrow. reviewed need for medical necessity to remain in the hospital. Reviewed that pending hospice assessment tomorrow pt will:  1 admit to [facility] hospice  2 stay at hospital pending medical status  3 discharge to [facility] with [facility] admission to hospice at nursing home.    Plan for pt to admit to [facility]ice or [facility] via [place] with [facility].    Spontaneous, unlabored and symmetrical

## 2023-01-10 NOTE — ED ADULT NURSE NOTE - CAS DISCH ACCOMP BY
Plan: Biopsy in reserve for temple Detail Level: Zone Render In Strict Bullet Format?: No Initiate Treatment: Hydrocortisone 2.5% cream bid x 2 weeks Daughter/Family

## 2023-03-23 NOTE — ED PROVIDER NOTE - SECONDARY DIAGNOSIS.
"Admission Status; Secondary Review Determination     Under the authority of the Utilization Management Committee, the utilization review process indicated a secondary review on the above patient.  The review outcome is based on review of the medical records, discussions with staff, and applying clinical experience noted on the date of the review.       (x) Observation Status Appropriate - This patient does not meet hospital inpatient criteria and is placed in observation status. If this patient's primary payer is Medicare and was admitted as an inpatient, Condition Code 44 should be used and patient status changed to \"observation\".     RATIONALE FOR DETERMINATION:  47yoF with DM, HTN, CAD s/p CABG, MDD, and chronic pain presented to the ED with right ankle pain, swelling and bruising after a slip and fall on ice.  Observation care appropriate for initial supportive measures, pain control while awaiting transfer to a higher level orthopedic hospital for definitive surgical management.    The severity of illness, intensity of service provided, expected LOS and risk for adverse outcome make the care appropriate for further observation; however, doesn't meet criteria for hospital inpatient admission. This was discussed with attending physician who concurred with this determination.    The information on this document is developed by the utilization review team in order for the business office to ensure compliance.  This only denotes the appropriateness of proper admission status and does not reflect the quality of care rendered.         The definitions of Inpatient Status and Observation Status used in making the determination above are those provided in the CMS Coverage Manual, Chapter 1 and Chapter 6, section 70.4.      Sincerely,     Eleno Xiong MD    Physician Advisor  Utilization Review/ Case Management  [facility].    " Acute pain of left shoulder Pain of left hip joint

## 2023-09-02 NOTE — ED ADULT NURSE NOTE - ED CARDIAC CAPILLARY REFILL
Subjective   History of Present Illness  29-year-old male presents after he states puppy urinated on the floor.  He states he became furious but the puppy outside kicked a hole in the wall.  He states he has had some anger problems in the past not to this degree.  He has had some thoughts of hurting himself for about the last 6 months but he states he does not have a plan.  Other than some marijuana he denies any substance abuse.  He had been on Adderall in the past but had weaned off of this with his doctors assistance.  Review of Systems    No past medical history on file.  Attention deficit disorder  No Known Allergies    No past surgical history on file.    No family history on file.    Social History     Socioeconomic History    Marital status: Single   No current medications        Objective   Physical Exam  29-year-old male awake alert.  Generally well-developed well-nourished.  Pupils equal round react light.  Neck supple chest clear cardiovascular and rhythm abdomen soft nontender examination of extremities minor abrasion to his left heel from where he had kicked through the drywall.  He does not have any bony tenderness.  Neurologic exam no focal findings noted.  Psychiatric he is past flat affect.  He is near tears at times.  Procedures           ED Course                                           Medical Decision Making    Angélica was contacted for evaluation.  After their evaluation they recommended intensive outpatient therapy.  This was discussed with patient and his father.  He is on board with outpatient therapy reports he should have done it previously.  He does not desire inpatient he feels safe going home.  Final diagnoses:   Depression, unspecified depression type   Anger       ED Disposition  ED Disposition       ED Disposition   Discharge    Condition   Stable    Comment   --               Cosme Neal PA-C  [address] [facility] [place] [address] [phone]          ANGÉLICA  [address] [id number] [phone]             Medication List      No changes were made to your prescriptions during this visit.            Kole Peters MD  09/02/23 0254     2 seconds or less
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
## 2024-04-10 NOTE — ED ADULT TRIAGE NOTE - AS TEMP SITE
Please follow up with your primary care physician and dentist. Return to the emergency department if your symptoms do not resolve and/or worsen. If you've been prescribed medications, please take your medications as prescribed.  ------------------------------------------------------------------------------------------------------------------------  Dental Pain    Dental pain (toothache) may be caused by many things including tooth decay (cavities or caries), abscess or infection, or trauma. If you were prescribed an antibiotic medicine, finish all of it even if you start to feel better. Rinsing your mouth with salt water or applying ice to the painful area of your face may help with the pain. Follow up with a dentist is important in ensuring good oral health and preventing the worsening of dental disease.    SEEK IMMEDIATE MEDICAL CARE IF YOU HAVE ANY OF THE FOLLOWING SYMPTOMS: unable to open your mouth, trouble breathing or swallowing, fever, or swelling of the face, neck, or jaw.
oral

## 2024-04-18 NOTE — ED ADULT NURSE NOTE - CHIEF COMPLAINT QUOTE
No As per EMS states pt stopped taking his medication to take holistic meds. States pt with weight loss, dehydration and fogginess.

## 2024-04-26 ENCOUNTER — APPOINTMENT (OUTPATIENT)
Dept: VASCULAR SURGERY | Facility: CLINIC | Age: 89
End: 2024-04-26
Payer: MEDICARE

## 2024-04-26 VITALS
SYSTOLIC BLOOD PRESSURE: 109 MMHG | OXYGEN SATURATION: 97 % | TEMPERATURE: 98 F | DIASTOLIC BLOOD PRESSURE: 64 MMHG | HEIGHT: 64 IN | BODY MASS INDEX: 26.29 KG/M2 | HEART RATE: 75 BPM | WEIGHT: 154 LBS

## 2024-04-26 PROCEDURE — 93923 UPR/LXTR ART STDY 3+ LVLS: CPT

## 2024-04-26 PROCEDURE — 99203 OFFICE O/P NEW LOW 30 MIN: CPT

## 2024-04-26 RX ORDER — TRIAMCINOLONE ACETONIDE 1 MG/G
0.1 CREAM TOPICAL
Refills: 0 | Status: ACTIVE | COMMUNITY

## 2024-04-26 RX ORDER — TAMSULOSIN HYDROCHLORIDE 0.4 MG/1
0.4 CAPSULE ORAL
Refills: 0 | Status: ACTIVE | COMMUNITY

## 2024-04-26 NOTE — PHYSICAL EXAM
[Normal Breath Sounds] : Normal breath sounds [Normal Heart Sounds] : normal heart sounds [2+] : left 2+ [de-identified] : NAD. Neurologically intact.  [FreeTextEntry1] : Feet are warm. Pedal pulses are non-palpable. No lower extremity edema or wounds present.

## 2024-04-26 NOTE — HISTORY OF PRESENT ILLNESS
[FreeTextEntry1] : I just had the pleasure of seeing Mr. Cb Loyd in consultation from Dr. Xiang Suh for lower extremity arterial insufficiency.  Mr. Loyd is an 88 year old gentleman who reports a history of lower extremity weakness. He denies any symptoms consistent with claudication, rest pain or tissue loss. He reports that he underwent lower extremity stent placement by Dr. Gibbs one year ago (laterality unclear). He complains of join pain. He denies any focal neurologic deficits. He ambulates with a cane. He denies any lower extremity edema, varicose veins, skin pigmentation changes or ulcers.  His medical history is significant for CAD s/p PCI/stents, MI, prostate cancer s/p prostatectomy, adrenal cancer, HTN, HLD, RAD and DJD.  Medications: Aspirin, Plavix, Metoprolol, Lipitor, and Flomax.  All: NKDA  SH: has smoked 6-12 cigarettes/day for 50 years.  FH: NC

## 2024-04-26 NOTE — ASSESSMENT
[FreeTextEntry1] : In summary, Mr. Loyd presents for evaluation for lower extremity arterial insufficiency. TREVON/PVR were obtained in the office today and were consistent with mild infragenicular atherosclerosis. Right TREVON 0.87, left 0.88. Toe pressures are 68 right and 65 left. There is no indication for intervention at this time. I counselled him on the importance of smoking cessation and instructed him to continue antiplatelet and statin therapy.   He will follow up in six months with repeat TREVON and aortoiliac duplex.   Thank you for allowing me to participate in the care of this nice man. Please do not hesitate to contact me with any questions.

## 2024-05-29 NOTE — PROGRESS NOTE ADULT - ATTENDING COMMENTS
patient initially admitted with asymptomatic covid  spiked fever and now hypoxic requiring oxygen   will start RDV and dec   Monitor clinically.  Monitor Oxygenation  O2 supplementation.  Remdesivir - up to 5 days depending on clinical course.  Monitor CBC, CMP daily  Ferritin, CRP, and D dimer q 48 hrs.  IV Dexamethasone 6mg q24hrs x10 days if hypoxia.  Anticoagulation per protocol.  Monitor for any bacterial superinfection/complications.  This tx plan was formulated utilizing my clinical judgement, currently available local/regional anecdotal information, organizational treatment recommendations with COVID-19 specific considerations given rapidly changing information available.  blood cultures were drawn today -will follow    D/W Dr. Brian Vinson DO  Infectious Disease Attending  Pager [phone]  After 5pm/weekends please call [phone] for all inquiries and new consults no

## 2024-11-01 ENCOUNTER — APPOINTMENT (OUTPATIENT)
Dept: VASCULAR SURGERY | Facility: CLINIC | Age: 89
End: 2024-11-01

## 2025-01-21 ENCOUNTER — APPOINTMENT (OUTPATIENT)
Dept: RADIOLOGY | Facility: HOSPITAL | Age: 89
End: 2025-01-21

## 2025-01-22 ENCOUNTER — APPOINTMENT (OUTPATIENT)
Dept: CT IMAGING | Facility: CLINIC | Age: 89
End: 2025-01-22
Payer: MEDICARE

## 2025-01-22 ENCOUNTER — OUTPATIENT (OUTPATIENT)
Dept: OUTPATIENT SERVICES | Facility: HOSPITAL | Age: 89
LOS: 1 days | End: 2025-01-22
Payer: MEDICARE

## 2025-01-22 DIAGNOSIS — Z00.00 ENCOUNTER FOR GENERAL ADULT MEDICAL EXAMINATION WITHOUT ABNORMAL FINDINGS: ICD-10-CM

## 2025-01-22 PROCEDURE — 74263 CT COLONOGRAPHY SCREENING: CPT | Mod: 26

## 2025-01-22 PROCEDURE — 74263 CT COLONOGRAPHY SCREENING: CPT

## 2025-02-26 ENCOUNTER — INPATIENT (INPATIENT)
Facility: HOSPITAL | Age: 89
LOS: 8 days | Discharge: NURSING, REHAB OR LONG-TERM CARE | End: 2025-03-07
Attending: HOSPITALIST | Admitting: HOSPITALIST
Payer: MEDICARE

## 2025-02-26 VITALS
RESPIRATION RATE: 18 BRPM | TEMPERATURE: 98 F | DIASTOLIC BLOOD PRESSURE: 83 MMHG | WEIGHT: 154.98 LBS | OXYGEN SATURATION: 100 % | HEIGHT: 66 IN | SYSTOLIC BLOOD PRESSURE: 137 MMHG | HEART RATE: 77 BPM

## 2025-02-26 PROCEDURE — 72131 CT LUMBAR SPINE W/O DYE: CPT | Mod: 26

## 2025-02-26 PROCEDURE — 99285 EMERGENCY DEPT VISIT HI MDM: CPT

## 2025-02-26 PROCEDURE — 72128 CT CHEST SPINE W/O DYE: CPT | Mod: 26

## 2025-02-26 RX ORDER — TRAMADOL HYDROCHLORIDE 50 MG/1
25 TABLET, FILM COATED ORAL ONCE
Refills: 0 | Status: DISCONTINUED | OUTPATIENT
Start: 2025-02-26 | End: 2025-02-26

## 2025-02-26 RX ORDER — METHOCARBAMOL 500 MG/1
500 TABLET, FILM COATED ORAL ONCE
Refills: 0 | Status: COMPLETED | OUTPATIENT
Start: 2025-02-26 | End: 2025-02-26

## 2025-02-26 RX ORDER — LIDOCAINE HYDROCHLORIDE 20 MG/ML
1 JELLY TOPICAL ONCE
Refills: 0 | Status: COMPLETED | OUTPATIENT
Start: 2025-02-26 | End: 2025-02-26

## 2025-02-26 RX ADMIN — LIDOCAINE HYDROCHLORIDE 1 PATCH: 20 JELLY TOPICAL at 21:11

## 2025-02-26 RX ADMIN — METHOCARBAMOL 500 MILLIGRAM(S): 500 TABLET, FILM COATED ORAL at 21:12

## 2025-02-26 NOTE — ED ADULT TRIAGE NOTE - CHIEF COMPLAINT QUOTE
pt c/o right side back pain for a few weeks. also c/o difficulty walking.  denies injury or hematuria. history of sciatica and circulation issue,

## 2025-02-26 NOTE — ED PROVIDER NOTE - PROGRESS NOTE DETAILS
Nicoter: CT imaging w/o acute injury, chronic DJD. On re-eval, pt w/ improvement in pain, but remains unable to ambulate comfortably. Per daughter, pt lives alone in home w/ 3 flights of stairs and needs access to all 3 levels of home. Will admit to medicine (d/w Dr Archuleta). Pt, daughter updated to results, admission. They understand / agree w/ this plan.

## 2025-02-26 NOTE — ED PROVIDER NOTE - OBJECTIVE STATEMENT
89 y/o male with cad with stent, leg stent here with lower back pain x few weeks. Denies trauma. Pain is worse with movement. Pt saw pmd and was given tylenol and told it was muscular. Pt also report shaving constipation. Last bm was yesterday. Pt has chronic urinary incontinence. Denies fever, chills, nausea, abdominal pain,  vomiting, dysuria, numbness, tingling, weakness in the legs. Pt has been using walker to ambulate.

## 2025-02-26 NOTE — ED ADULT NURSE NOTE - NSFALLRISKINTERV_ED_ALL_ED

## 2025-02-26 NOTE — ED PROVIDER NOTE - CLINICAL SUMMARY MEDICAL DECISION MAKING FREE TEXT BOX
88 y/o male with cad with stent, leg stent here with low back pain x few weeks worse with movement. NO neurological deficits or red flags.   Ddx include but not limited sciatica, msk pain, Low suspicion for cord compression.   Will obtain ct lumbar and ct thoracic and treat with lidocaine and robaxin.

## 2025-02-26 NOTE — ED ADULT NURSE NOTE - OBJECTIVE STATEMENT
pt aaox4, ambulatory with walker. Pt presenting with R middle/lower back pain for the past couple weeks. Pt denies trauma and reports pain with movement. Pt reports to have seen PCP today and Rx tylenol but has no relief. Pt reports decreased PO intake. PMH - CAD with stent and leg stent. JAIMIE samuel at bedside.

## 2025-02-26 NOTE — ED PROVIDER NOTE - PHYSICAL EXAMINATION
GEN: Awake, alert, interactive, NAD.  HEAD AND NECK: NC/AT. Airway patent. Neck supple.   EYES:  Clear b/l. EOMI. PERRL.   ENT: Moist mucus membranes.   CARDIAC: Regular rate, regular rhythm. No evident pedal edema.    RESP/CHEST: Normal respiratory effort with no use of accessory muscles or retractions. Clear throughout on auscultation.  ABD: soft, non-distended, non-tender. No rebound, no guarding.   BACK: No midline spinal TTP. No CVAT. (-) slr   EXTREMITIES: Moving all extremities with no apparent deformities.   SKIN: Warm, dry, intact normal color. No rash.   NEURO: AOx3, CN II-XII grossly intact, no focal deficits.   PSYCH: Appropriate mood and affect.

## 2025-02-26 NOTE — ED PROVIDER NOTE - NS ED ROS FT
CONSTITUTIONAL: No fever, no chills, no fatigue  EYES: No visual changes  ENT: No ear pain, no sore throat  CARDIOVASCULAR: No chest pain, no palpitations  RESPIRATORY: No cough, no SOB  GI: No abdominal pain, no nausea, no vomiting, no constipation, no diarrhea  GENITOURINARY: No dysuria, no frequency, no hematuria  MUSKULOSKELETAL:  no joint pain, no myalgias  SKIN: No rash  NEURO: No headache    ALL OTHER SYSTEMS NEGATIVE.

## 2025-02-26 NOTE — ED ADULT TRIAGE NOTE - PAIN: PRESENCE, MLM
Medicare Wellness Visit  Plan for Preventive Care    A good way for you to stay healthy is to use preventive care.  Medicare covers many services that can help you stay healthy.* The goal of these services is to find any health problems as quickly as possible. Finding problems early can help make them easier to treat.  Your personal plan below lists the services you may need and when they are due.     Health Maintenance Summary     Shingles Vaccine (1 of 2)  Overdue since 5/20/1992    Influenza Vaccine (1)  Order placed this encounter    Medicare Wellness Visit (Yearly)  Due since 10/2/2020    DM/CKD Microalbumin (Yearly)  Ordered on 12/12/2019    DM/CKD GFR (Yearly)  Ordered on 12/12/2019    Depression Screening (Yearly)  Next due on 10/8/2021    DTaP/Tdap/Td Vaccine (2 - Td)  Next due on 8/16/2028    Pneumococcal Vaccine 65+   Completed    Meningococcal Vaccine   Aged Out    HPV Vaccine   Aged Out           Preventive Care for Women and Men    Heart Screenings (Cardiovascular):  · Blood tests are used to check your cholesterol, lipid and triglyceride levels. High levels can increase your risk for heart disease and stroke. High levels can be treated with medications, diet and exercise. Lowering your levels can help keep your heart and blood vessels healthy.  Your provider will order these tests if they are needed.    · An ultrasound is done to see if you have an abdominal aortic aneurysm (AAA).  This is an enlargement of one of the main blood vessels that delivers blood to the body.   In the [place], 9,000 deaths are caused by AAA.  You may not even know you have this problem and as many as 1 in 3 people will have a serious problem if it is not treated.  Early diagnosis allows for more effective treatment and cure.  If you have a family history of AAA or are a male age 65-75 who has smoked, you are at higher risk of an AAA.  Your provider can order this test, if needed.    Colorectal Screening:  · There are  many tests that are used to check for cancer of your colon and rectum. You and your provider should discuss what test is best for you and when to have it done.  Options include:  · Screening Colonoscopy: exam of the entire colon, seen through a flexible lighted tube.  · Flexible Sigmoidoscopy: exam of the last third (sigmoid portion) of the colon and rectum, seen through a flexible lighted tube.  · Cologuard DNA stool test: a sample of your stool is used to screen for cancer and unseen blood in your stool.  · Fecal Occult Blood Test: a sample of your stool is studied to find any unseen blood    Flu Shot:  · An immunization that helps to prevent influenza (the flu). You should get this every year. The best time to get the shot is in the fall.    Pneumococcal Shot:  • Vaccines are available that can help prevent pneumococcal disease, which is any type of infection caused by Streptococcus pneumoniae bacteria.   Their use can prevent some cases of pneumonia, meningitis, and sepsis. There are two types of pneumococcal vaccines:   o Conjugate vaccines (PCV-13 or Prevnar 13®) - helps protect against the 13 types of pneumococcal bacteria that are the most common causes of serious infections in children and adults.    o Polysaccharide vaccine (PPSV23 or Fchmltlfn20®) - helps protect against 23 types of pneumococcal bacteria for patients who are recommended to get it.  These vaccines should be given at least 12 months apart.  A booster is usually not needed.     Hepatitis B Shot:  · An immunization that helps to protect people from getting Hepatitis B. Hepatitis B is a virus that spreads through contact with infected blood or body fluids. Many people with the virus do not have symptoms.  The virus can lead to serious problems, such as liver disease. Some people are at higher risk than others. Your doctor will tell you if you need this shot.     Diabetes Screening:  · A test to measure sugar (glucose) in your blood is called a  fasting blood sugar. Fasting means you cannot have food or drink for at least 8 hours before the test. This test can detect diabetes long before you may notice symptoms.    Glaucoma Screening:  · Glaucoma screening is performed by your eye doctor. The test measures the fluid pressure inside your eyes to determine if you have glaucoma.     Hepatitis C Screening:  · A blood test to see if you have the hepatitis C virus.  Hepatitis C attacks the liver and is a major cause of chronic liver disease.  Medicare will cover a single screening for all adults born between 1945 & 1965, or high risk patients (people who have injected illegal drugs or people who have had blood transfusions).  High risk patients who continue to inject illegal drugs can be screened for Hepatitis C every year.    Smoking and Tobacco-Use Cessation Counseling:  · Tobacco is the single greatest cause of disease and early death in our country today. Medication and counseling together can increase a person’s chance of quitting for good.   · Medicare covers two quitting attempts per year, with four counseling sessions per attempt (eight sessions in a 12 month period)    Preventive Screening tests for Women    Screening Mammograms and Breast Exams:  · An x-ray of your breasts to check for breast cancer before you or your doctor may be able to feel it.  If breast cancer is found early it can usually be treated with success.    Pelvic Exams and Pap Tests:  · An exam to check for cervical and vaginal cancer. A Pap test is a lab test in which cells are taken from your cervix and sent to the lab to look for signs of cervical cancer. If cancer of the cervix is found early, chances for a cure are good. Testing can generally end at age 65, or if a woman has a hysterectomy for a benign condition. Your provider may recommend more frequent testing if certain abnormal results are found.    Bone Mass Measurements:  · A painless x-ray of your bone density to see if you  are at risk for a broken bone. Bone density refers to the thickness of bones or how tightly the bone tissue is packed.    Preventive Screening tests for Men    Prostate Screening:  · Should you have a prostate cancer test (PSA)?  It is up to you to decide if you want a prostate cancer test. Talk to your clinician to find out if the test is right for you.  Things for you to consider and talk about should include:  · Benefits and harms of the test  · Your family history  · How your race/ethnicity may influence the test  · If the test may impact other medical conditions you have  · Your values on screenings and treatments    *Medicare pays for many preventive services to keep you healthy. For some of these services, you might have to pay a deductible, coinsurance, and / or copayment.  The amounts vary depending on the type of services you need and the kind of Medicare health plan you have.              Education Materials    Handouts provided during this visit.  ________________________________________  [facility] instructions provided during this visit.  ________________________________________  Websites discussed during this visit.  ________________________________________  Additional Materials        back/complains of pain/discomfort

## 2025-02-26 NOTE — ED PROVIDER NOTE - ATTENDING APP SHARED VISIT CONTRIBUTION OF CARE
Dichter: Pt seen w/ PA, 89M PMH CAD s/p PCI, PAD s/p stent, on Plavix, BIB daughter d/t low back pain x weeks. Pt typically ambulatory w/ cane, using walker d/t pain. Pain is worse w/ movement. Pt saw PMD, told likely MSK, prescribed Tylenol, taking w/o relief. ROS otherwise negative: Denies F/C, N/V/D. Pt incontinent of urine at baseline. Afebrile, VSS. Well-appearing, in NAD. Agree w/ planned w/u and dispo. Dichter: Pt seen w/ PA, 89M PMH CAD s/p PCI, PAD s/p stent, on Plavix, BIB daughter d/t atraumatic low back pain x weeks. Pt typically ambulatory w/ cane, using walker d/t pain. Pain is worse w/ movement. Pt saw PMD, told likely MSK, prescribed Tylenol, taking w/o relief. ROS otherwise negative: Denies F/C, N/V/D. Pt incontinent of urine at baseline. Afebrile, VSS. Well-appearing, in NAD. Agree w/ planned w/u and dispo.

## 2025-02-27 DIAGNOSIS — Z29.9 ENCOUNTER FOR PROPHYLACTIC MEASURES, UNSPECIFIED: ICD-10-CM

## 2025-02-27 DIAGNOSIS — R26.2 DIFFICULTY IN WALKING, NOT ELSEWHERE CLASSIFIED: ICD-10-CM

## 2025-02-27 DIAGNOSIS — I25.10 ATHEROSCLEROTIC HEART DISEASE OF NATIVE CORONARY ARTERY WITHOUT ANGINA PECTORIS: ICD-10-CM

## 2025-02-27 DIAGNOSIS — I10 ESSENTIAL (PRIMARY) HYPERTENSION: ICD-10-CM

## 2025-02-27 DIAGNOSIS — M54.9 DORSALGIA, UNSPECIFIED: ICD-10-CM

## 2025-02-27 LAB
ALBUMIN SERPL ELPH-MCNC: 3.1 G/DL — LOW (ref 3.3–5)
ALBUMIN SERPL ELPH-MCNC: 3.3 G/DL — SIGNIFICANT CHANGE UP (ref 3.3–5)
ALP SERPL-CCNC: 107 U/L — SIGNIFICANT CHANGE UP (ref 40–120)
ALP SERPL-CCNC: 110 U/L — SIGNIFICANT CHANGE UP (ref 40–120)
ALT FLD-CCNC: 14 U/L — SIGNIFICANT CHANGE UP (ref 12–78)
ALT FLD-CCNC: 15 U/L — SIGNIFICANT CHANGE UP (ref 12–78)
ANION GAP SERPL CALC-SCNC: 3 MMOL/L — LOW (ref 5–17)
ANION GAP SERPL CALC-SCNC: 5 MMOL/L — SIGNIFICANT CHANGE UP (ref 5–17)
AST SERPL-CCNC: 11 U/L — LOW (ref 15–37)
AST SERPL-CCNC: 27 U/L — SIGNIFICANT CHANGE UP (ref 15–37)
BASOPHILS # BLD AUTO: 0.02 K/UL — SIGNIFICANT CHANGE UP (ref 0–0.2)
BASOPHILS NFR BLD AUTO: 0.4 % — SIGNIFICANT CHANGE UP (ref 0–2)
BILIRUB SERPL-MCNC: 0.4 MG/DL — SIGNIFICANT CHANGE UP (ref 0.2–1.2)
BILIRUB SERPL-MCNC: 0.4 MG/DL — SIGNIFICANT CHANGE UP (ref 0.2–1.2)
BUN SERPL-MCNC: 16 MG/DL — SIGNIFICANT CHANGE UP (ref 7–23)
BUN SERPL-MCNC: 17 MG/DL — SIGNIFICANT CHANGE UP (ref 7–23)
CALCIUM SERPL-MCNC: 9.1 MG/DL — SIGNIFICANT CHANGE UP (ref 8.5–10.1)
CALCIUM SERPL-MCNC: 9.1 MG/DL — SIGNIFICANT CHANGE UP (ref 8.5–10.1)
CHLORIDE SERPL-SCNC: 110 MMOL/L — HIGH (ref 96–108)
CHLORIDE SERPL-SCNC: 110 MMOL/L — HIGH (ref 96–108)
CO2 SERPL-SCNC: 25 MMOL/L — SIGNIFICANT CHANGE UP (ref 22–31)
CO2 SERPL-SCNC: 26 MMOL/L — SIGNIFICANT CHANGE UP (ref 22–31)
CREAT SERPL-MCNC: 1.36 MG/DL — HIGH (ref 0.5–1.3)
CREAT SERPL-MCNC: 1.41 MG/DL — HIGH (ref 0.5–1.3)
EGFR: 48 ML/MIN/1.73M2 — LOW
EGFR: 48 ML/MIN/1.73M2 — LOW
EGFR: 50 ML/MIN/1.73M2 — LOW
EGFR: 50 ML/MIN/1.73M2 — LOW
EOSINOPHIL # BLD AUTO: 0.08 K/UL — SIGNIFICANT CHANGE UP (ref 0–0.5)
EOSINOPHIL NFR BLD AUTO: 1.6 % — SIGNIFICANT CHANGE UP (ref 0–6)
GLUCOSE SERPL-MCNC: 128 MG/DL — HIGH (ref 70–99)
GLUCOSE SERPL-MCNC: 88 MG/DL — SIGNIFICANT CHANGE UP (ref 70–99)
HCT VFR BLD CALC: 41.5 % — SIGNIFICANT CHANGE UP (ref 39–50)
HGB BLD-MCNC: 13.3 G/DL — SIGNIFICANT CHANGE UP (ref 13–17)
IMM GRANULOCYTES NFR BLD AUTO: 0.2 % — SIGNIFICANT CHANGE UP (ref 0–0.9)
LYMPHOCYTES # BLD AUTO: 2.35 K/UL — SIGNIFICANT CHANGE UP (ref 1–3.3)
LYMPHOCYTES # BLD AUTO: 46.2 % — HIGH (ref 13–44)
MCHC RBC-ENTMCNC: 30 PG — SIGNIFICANT CHANGE UP (ref 27–34)
MCHC RBC-ENTMCNC: 32 G/DL — SIGNIFICANT CHANGE UP (ref 32–36)
MCV RBC AUTO: 93.5 FL — SIGNIFICANT CHANGE UP (ref 80–100)
MONOCYTES # BLD AUTO: 0.41 K/UL — SIGNIFICANT CHANGE UP (ref 0–0.9)
MONOCYTES NFR BLD AUTO: 8.1 % — SIGNIFICANT CHANGE UP (ref 2–14)
NEUTROPHILS # BLD AUTO: 2.22 K/UL — SIGNIFICANT CHANGE UP (ref 1.8–7.4)
NEUTROPHILS NFR BLD AUTO: 43.5 % — SIGNIFICANT CHANGE UP (ref 43–77)
NRBC BLD AUTO-RTO: 0 /100 WBCS — SIGNIFICANT CHANGE UP (ref 0–0)
PLATELET # BLD AUTO: 190 K/UL — SIGNIFICANT CHANGE UP (ref 150–400)
POTASSIUM SERPL-MCNC: 3.9 MMOL/L — SIGNIFICANT CHANGE UP (ref 3.5–5.3)
POTASSIUM SERPL-MCNC: 4.3 MMOL/L — SIGNIFICANT CHANGE UP (ref 3.5–5.3)
POTASSIUM SERPL-SCNC: 3.9 MMOL/L — SIGNIFICANT CHANGE UP (ref 3.5–5.3)
POTASSIUM SERPL-SCNC: 4.3 MMOL/L — SIGNIFICANT CHANGE UP (ref 3.5–5.3)
PROT SERPL-MCNC: 6.6 GM/DL — SIGNIFICANT CHANGE UP (ref 6–8.3)
PROT SERPL-MCNC: 6.8 GM/DL — SIGNIFICANT CHANGE UP (ref 6–8.3)
RBC # BLD: 4.44 M/UL — SIGNIFICANT CHANGE UP (ref 4.2–5.8)
RBC # FLD: 14.9 % — HIGH (ref 10.3–14.5)
SODIUM SERPL-SCNC: 138 MMOL/L — SIGNIFICANT CHANGE UP (ref 135–145)
SODIUM SERPL-SCNC: 141 MMOL/L — SIGNIFICANT CHANGE UP (ref 135–145)
WBC # BLD: 5.09 K/UL — SIGNIFICANT CHANGE UP (ref 3.8–10.5)
WBC # FLD AUTO: 5.09 K/UL — SIGNIFICANT CHANGE UP (ref 3.8–10.5)

## 2025-02-27 PROCEDURE — 99222 1ST HOSP IP/OBS MODERATE 55: CPT

## 2025-02-27 RX ORDER — MAGNESIUM, ALUMINUM HYDROXIDE 200-200 MG
30 TABLET,CHEWABLE ORAL EVERY 4 HOURS
Refills: 0 | Status: DISCONTINUED | OUTPATIENT
Start: 2025-02-27 | End: 2025-02-27

## 2025-02-27 RX ORDER — LIDOCAINE HYDROCHLORIDE 20 MG/ML
1 JELLY TOPICAL DAILY
Refills: 0 | Status: DISCONTINUED | OUTPATIENT
Start: 2025-02-27 | End: 2025-03-07

## 2025-02-27 RX ORDER — METOPROLOL SUCCINATE 50 MG/1
25 TABLET, EXTENDED RELEASE ORAL DAILY
Refills: 0 | Status: DISCONTINUED | OUTPATIENT
Start: 2025-02-27 | End: 2025-03-07

## 2025-02-27 RX ORDER — SENNA 187 MG
2 TABLET ORAL AT BEDTIME
Refills: 0 | Status: DISCONTINUED | OUTPATIENT
Start: 2025-02-27 | End: 2025-03-07

## 2025-02-27 RX ORDER — ATORVASTATIN CALCIUM 80 MG/1
40 TABLET, FILM COATED ORAL AT BEDTIME
Refills: 0 | Status: DISCONTINUED | OUTPATIENT
Start: 2025-02-27 | End: 2025-03-07

## 2025-02-27 RX ORDER — MELATONIN 5 MG
3 TABLET ORAL AT BEDTIME
Refills: 0 | Status: DISCONTINUED | OUTPATIENT
Start: 2025-02-27 | End: 2025-03-07

## 2025-02-27 RX ORDER — ACETAMINOPHEN 500 MG/5ML
650 LIQUID (ML) ORAL EVERY 6 HOURS
Refills: 0 | Status: DISCONTINUED | OUTPATIENT
Start: 2025-02-27 | End: 2025-03-07

## 2025-02-27 RX ORDER — CLOPIDOGREL BISULFATE 75 MG/1
75 TABLET, FILM COATED ORAL DAILY
Refills: 0 | Status: DISCONTINUED | OUTPATIENT
Start: 2025-02-27 | End: 2025-03-07

## 2025-02-27 RX ORDER — RANOLAZINE 1000 MG/1
500 TABLET, FILM COATED, EXTENDED RELEASE ORAL
Refills: 0 | Status: DISCONTINUED | OUTPATIENT
Start: 2025-02-27 | End: 2025-03-07

## 2025-02-27 RX ORDER — ISOSORBIDE MONONITRATE 60 MG/1
30 TABLET, EXTENDED RELEASE ORAL DAILY
Refills: 0 | Status: DISCONTINUED | OUTPATIENT
Start: 2025-02-27 | End: 2025-03-07

## 2025-02-27 RX ORDER — POLYETHYLENE GLYCOL 3350 17 G/17G
17 POWDER, FOR SOLUTION ORAL DAILY
Refills: 0 | Status: DISCONTINUED | OUTPATIENT
Start: 2025-02-27 | End: 2025-03-07

## 2025-02-27 RX ORDER — CYCLOBENZAPRINE HYDROCHLORIDE 15 MG/1
5 CAPSULE, EXTENDED RELEASE ORAL THREE TIMES A DAY
Refills: 0 | Status: DISCONTINUED | OUTPATIENT
Start: 2025-02-27 | End: 2025-02-28

## 2025-02-27 RX ADMIN — Medication 3 MILLIGRAM(S): at 22:35

## 2025-02-27 RX ADMIN — METOPROLOL SUCCINATE 25 MILLIGRAM(S): 50 TABLET, EXTENDED RELEASE ORAL at 05:41

## 2025-02-27 RX ADMIN — Medication 650 MILLIGRAM(S): at 05:42

## 2025-02-27 RX ADMIN — Medication 650 MILLIGRAM(S): at 06:42

## 2025-02-27 RX ADMIN — TRAMADOL HYDROCHLORIDE 25 MILLIGRAM(S): 50 TABLET, FILM COATED ORAL at 00:17

## 2025-02-27 RX ADMIN — ATORVASTATIN CALCIUM 40 MILLIGRAM(S): 80 TABLET, FILM COATED ORAL at 21:33

## 2025-02-27 RX ADMIN — LIDOCAINE HYDROCHLORIDE 1 PATCH: 20 JELLY TOPICAL at 09:04

## 2025-02-27 RX ADMIN — RANOLAZINE 500 MILLIGRAM(S): 1000 TABLET, FILM COATED, EXTENDED RELEASE ORAL at 17:03

## 2025-02-27 RX ADMIN — CLOPIDOGREL BISULFATE 75 MILLIGRAM(S): 75 TABLET, FILM COATED ORAL at 17:03

## 2025-02-27 RX ADMIN — ISOSORBIDE MONONITRATE 30 MILLIGRAM(S): 60 TABLET, EXTENDED RELEASE ORAL at 17:03

## 2025-02-27 RX ADMIN — RANOLAZINE 500 MILLIGRAM(S): 1000 TABLET, FILM COATED, EXTENDED RELEASE ORAL at 05:42

## 2025-02-27 RX ADMIN — LIDOCAINE HYDROCHLORIDE 1 PATCH: 20 JELLY TOPICAL at 17:02

## 2025-02-27 RX ADMIN — LIDOCAINE HYDROCHLORIDE 1 PATCH: 20 JELLY TOPICAL at 19:48

## 2025-02-27 RX ADMIN — Medication 2 TABLET(S): at 21:33

## 2025-02-27 RX ADMIN — TRAMADOL HYDROCHLORIDE 25 MILLIGRAM(S): 50 TABLET, FILM COATED ORAL at 01:17

## 2025-02-27 NOTE — H&P ADULT - NSICDXPASTMEDICALHX_GEN_ALL_CORE_FT
PAST MEDICAL HISTORY:  CAD (coronary artery disease)     Hyperlipidemia     PAD (peripheral artery disease)     Stented coronary artery

## 2025-02-27 NOTE — CHART NOTE - NSCHARTNOTEFT_GEN_A_CORE
H&P dated today reviewed in full. H&P dated today reviewed in full. Admitted for LBP. CT of lumbar spine no severe spinal stenosis. Pain meds prn, PT eval. H&P dated today reviewed in full. Admitted for LBP. CT of lumbar spine no severe spinal stenosis. Pain meds prn, PT eval.     Discussed with family, they want rehab Friday.

## 2025-02-27 NOTE — H&P ADULT - PROBLEM SELECTOR PLAN 1
- Pt p/w back pain, unable to walk  - unable to take care of ADLs  - PT eval   - care management consult; may require placement

## 2025-02-27 NOTE — PATIENT PROFILE ADULT - FALL HARM RISK - HARM RISK INTERVENTIONS

## 2025-02-27 NOTE — H&P ADULT - NSHPPHYSICALEXAM_GEN_ALL_CORE
GENERAL: NAD  HEAD:  Atraumatic, normocephalic  EYES: EOMI, PERRL  NECK: Supple, trachea midline, no JVD  HEART: Regular rate and rhythm  LUNGS: Unlabored respirations. Clear to auscultation bilaterally, no crackles, wheezing, or rhonchi  ABDOMEN: Soft, nontender, nondistended, +BS  EXTREMITIES: 2+ peripheral pulses bilaterally. No clubbing, cyanosis, or edema  MSK: paraspinal tenderness, limited ROM  NERVOUS SYSTEM:  A&Ox3, moving all extremities, no focal deficits

## 2025-02-27 NOTE — H&P ADULT - ASSESSMENT
89-year-old male with past medical history of hypertension, CAD status post stents, PAD, sciatica, hyperlipidemia who presented to the ED complaining of right-sided back pain for a few weeks to the point that the patient can no longer walk or take care of ADLs. CT thoracic/lumbar spine revealed multilevel spondylosis + canal stenosis and foraminal stenosis.  Given the patient ambulatory dysfunction and living condition, admit to medicine for further management

## 2025-02-27 NOTE — H&P ADULT - PROBLEM SELECTOR PLAN 2
- CT lumbar spine: Advanced multilevel lumbar spondylosis as detailed above associated with mild canal stenosis and moderate foraminal stenosis at L1-L2 and L2-L3.  Mild canal and foraminal stenosis at the other lumbar levels. No significant interval change compared to 02/20/2025 CT.  - CT thoracic spine;  Moderately advanced degenerative endplate sclerosis, irregularity and disc space narrowing in the upper and midthoracic levels, progressed from MRI dated 11/29/2021.  No evidence of high-grade canal or foraminal stenosis on CT.  - PT eval

## 2025-02-27 NOTE — H&P ADULT - NSHPLABSRESULTS_GEN_ALL_CORE
< from: CT Lumbar Spine No Cont (02.26.25 @ 20:41) >    IMPRESSION:  1.   Advanced multilevel lumbar spondylosis as detailed above associated   with  mild canal stenosis and moderate foraminal stenosis at L1-L2 and L2-L3.    Mild  canal and foraminal stenosis at the other lumbar levels.  2.   No significant interval change compared to 02/20/2025 CT.    < end of copied text >    < from: CT Thoracic Spine No Cont (02.26.25 @ 20:40) >    IMPRESSION:  1.   Moderately advanced degenerative endplate sclerosis, irregularity   and disc  space narrowing in the upper and midthoracic levels, progressed from MRI   dated  11/29/2021.  No evidence of high-grade canal or foraminal stenosis on CT.  2.   Please note that the combined images of the thoracic and lumbar   spine are  included under the lumbar spine accession.    < end of copied text >      cret

## 2025-02-27 NOTE — H&P ADULT - HISTORY OF PRESENT ILLNESS
89-year-old male with past medical history of hypertension, CAD status post stents, PAD, sciatica, hyperlipidemia who presented to the ED complaining of right-sided back pain for a few weeks to the point that the patient can no longer walk or take care of ADLs. The patient lives in a house with three flights of stairs and it's impossible for him to take care of himself at this point. He denies any fevers, chills, recent trauma, falls, syncope, chest pain, dizziness, or any other acute complaints.   On presentation, vital signs were stable. Labs unremarkable. CT thoracic/lumbar spine revealed multilevel spondylosis + canal stenosis and foraminal stenosis. Patient received lidocaine, Robaxin, and tramadol in the ED. Given the patient ambulatory dysfunction and living condition, the patient is to be admitted.

## 2025-02-28 PROCEDURE — 99232 SBSQ HOSP IP/OBS MODERATE 35: CPT

## 2025-02-28 RX ORDER — QUETIAPINE FUMARATE 25 MG/1
50 TABLET ORAL AT BEDTIME
Refills: 0 | Status: DISCONTINUED | OUTPATIENT
Start: 2025-02-28 | End: 2025-02-28

## 2025-02-28 RX ORDER — HEPARIN SODIUM 1000 [USP'U]/ML
5000 INJECTION INTRAVENOUS; SUBCUTANEOUS EVERY 12 HOURS
Refills: 0 | Status: DISCONTINUED | OUTPATIENT
Start: 2025-02-28 | End: 2025-03-07

## 2025-02-28 RX ORDER — QUETIAPINE FUMARATE 25 MG/1
12.5 TABLET ORAL
Refills: 0 | Status: DISCONTINUED | OUTPATIENT
Start: 2025-02-28 | End: 2025-02-28

## 2025-02-28 RX ORDER — QUETIAPINE FUMARATE 25 MG/1
25 TABLET ORAL
Refills: 0 | Status: DISCONTINUED | OUTPATIENT
Start: 2025-02-28 | End: 2025-03-04

## 2025-02-28 RX ORDER — QUETIAPINE FUMARATE 25 MG/1
25 TABLET ORAL AT BEDTIME
Refills: 0 | Status: DISCONTINUED | OUTPATIENT
Start: 2025-02-28 | End: 2025-02-28

## 2025-02-28 RX ADMIN — RANOLAZINE 500 MILLIGRAM(S): 1000 TABLET, FILM COATED, EXTENDED RELEASE ORAL at 17:39

## 2025-02-28 RX ADMIN — Medication 650 MILLIGRAM(S): at 17:39

## 2025-02-28 RX ADMIN — ISOSORBIDE MONONITRATE 30 MILLIGRAM(S): 60 TABLET, EXTENDED RELEASE ORAL at 11:40

## 2025-02-28 RX ADMIN — Medication 650 MILLIGRAM(S): at 18:21

## 2025-02-28 RX ADMIN — QUETIAPINE FUMARATE 12.5 MILLIGRAM(S): 25 TABLET ORAL at 11:40

## 2025-02-28 RX ADMIN — RANOLAZINE 500 MILLIGRAM(S): 1000 TABLET, FILM COATED, EXTENDED RELEASE ORAL at 05:28

## 2025-02-28 RX ADMIN — METOPROLOL SUCCINATE 25 MILLIGRAM(S): 50 TABLET, EXTENDED RELEASE ORAL at 05:28

## 2025-02-28 RX ADMIN — LIDOCAINE HYDROCHLORIDE 1 PATCH: 20 JELLY TOPICAL at 19:00

## 2025-02-28 RX ADMIN — LIDOCAINE HYDROCHLORIDE 1 PATCH: 20 JELLY TOPICAL at 11:45

## 2025-02-28 RX ADMIN — Medication 3 MILLIGRAM(S): at 21:00

## 2025-02-28 RX ADMIN — CLOPIDOGREL BISULFATE 75 MILLIGRAM(S): 75 TABLET, FILM COATED ORAL at 11:41

## 2025-02-28 RX ADMIN — HEPARIN SODIUM 5000 UNIT(S): 1000 INJECTION INTRAVENOUS; SUBCUTANEOUS at 17:39

## 2025-02-28 RX ADMIN — ATORVASTATIN CALCIUM 40 MILLIGRAM(S): 80 TABLET, FILM COATED ORAL at 21:00

## 2025-02-28 RX ADMIN — LIDOCAINE HYDROCHLORIDE 1 PATCH: 20 JELLY TOPICAL at 05:00

## 2025-02-28 RX ADMIN — QUETIAPINE FUMARATE 25 MILLIGRAM(S): 25 TABLET ORAL at 17:39

## 2025-02-28 RX ADMIN — POLYETHYLENE GLYCOL 3350 17 GRAM(S): 17 POWDER, FOR SOLUTION ORAL at 11:41

## 2025-02-28 RX ADMIN — Medication 2 TABLET(S): at 21:00

## 2025-02-28 NOTE — PROGRESS NOTE ADULT - ASSESSMENT
89-year-old male with past medical history of hypertension, CAD status post stents, PAD, sciatica, hyperlipidemia who presented to the ED complaining of right-sided back pain for a few weeks to the point that the patient can no longer walk or take care of ADLs.     CT thoracic/lumbar spine revealed multilevel spondylosis + canal stenosis and foraminal stenosis.      Given the patient ambulatory dysfunction and living condition, admit to medicine for further management.    PT eval pending.     Added Seraquel 50 mg at night and 25 mg bid prn agitation.      89-year-old male with past medical history of hypertension, CAD status post stents, PAD, sciatica, hyperlipidemia who presented to the ED complaining of right-sided back pain for a few weeks to the point that the patient can no longer walk or take care of ADLs.     CT thoracic/lumbar spine revealed multilevel spondylosis + canal stenosis and foraminal stenosis.      Given the patient ambulatory dysfunction and living condition, admit to medicine for further management.    PT eval pending.     Added Seraquel 50 mg at night and 25 mg bid prn agitation. EKG ordered.     Continue home meds: Renexa 500 mg bid, Plavix 75 mg/day, Lipitor 40 mg/day, Toprol 25 mg/day.      89-year-old male with past medical history of hypertension, CAD status post stents, PAD, sciatica, hyperlipidemia who presented to the ED complaining of right-sided back pain for a few weeks to the point that the patient can no longer walk or take care of ADLs.     CT thoracic/lumbar spine revealed multilevel spondylosis + canal stenosis and foraminal stenosis.      Given the patient ambulatory dysfunction and living condition, admit to medicine for further management.    PT eval pending.     Added Seraquel 50 mg at night and 25 mg bid prn agitation. EKG ordered.     Continue home meds: Renexa 500 mg bid, Plavix 75 mg/day, Lipitor 40 mg/day, Toprol 25 mg/day.     Addendum: Spoke to daughter today [place] at [phone].  All questions were answered.     UA was sent.

## 2025-03-01 LAB
APPEARANCE UR: ABNORMAL
BACTERIA # UR AUTO: ABNORMAL /HPF
BILIRUB UR-MCNC: NEGATIVE — SIGNIFICANT CHANGE UP
COLOR SPEC: YELLOW — SIGNIFICANT CHANGE UP
DIFF PNL FLD: ABNORMAL
GLUCOSE UR QL: NEGATIVE MG/DL — SIGNIFICANT CHANGE UP
KETONES UR-MCNC: ABNORMAL MG/DL
LEUKOCYTE ESTERASE UR-ACNC: ABNORMAL
NITRITE UR-MCNC: NEGATIVE — SIGNIFICANT CHANGE UP
PH UR: 6.5 — SIGNIFICANT CHANGE UP (ref 5–8)
PROT UR-MCNC: SIGNIFICANT CHANGE UP MG/DL
RBC CASTS # UR COMP ASSIST: 2 /HPF — SIGNIFICANT CHANGE UP (ref 0–4)
SP GR SPEC: 1.02 — SIGNIFICANT CHANGE UP (ref 1–1.03)
TRI-PHOS CRY UR QL COMP ASSIST: PRESENT
UROBILINOGEN FLD QL: 1 MG/DL — SIGNIFICANT CHANGE UP (ref 0.2–1)
WBC UR QL: 14 /HPF — HIGH (ref 0–5)

## 2025-03-01 PROCEDURE — 99231 SBSQ HOSP IP/OBS SF/LOW 25: CPT

## 2025-03-01 PROCEDURE — 93010 ELECTROCARDIOGRAM REPORT: CPT

## 2025-03-01 RX ORDER — TRAMADOL HYDROCHLORIDE 50 MG/1
25 TABLET, FILM COATED ORAL EVERY 6 HOURS
Refills: 0 | Status: DISCONTINUED | OUTPATIENT
Start: 2025-03-01 | End: 2025-03-07

## 2025-03-01 RX ADMIN — QUETIAPINE FUMARATE 25 MILLIGRAM(S): 25 TABLET ORAL at 05:03

## 2025-03-01 RX ADMIN — HEPARIN SODIUM 5000 UNIT(S): 1000 INJECTION INTRAVENOUS; SUBCUTANEOUS at 17:15

## 2025-03-01 RX ADMIN — Medication 650 MILLIGRAM(S): at 05:03

## 2025-03-01 RX ADMIN — METOPROLOL SUCCINATE 25 MILLIGRAM(S): 50 TABLET, EXTENDED RELEASE ORAL at 05:03

## 2025-03-01 RX ADMIN — QUETIAPINE FUMARATE 25 MILLIGRAM(S): 25 TABLET ORAL at 17:15

## 2025-03-01 RX ADMIN — ISOSORBIDE MONONITRATE 30 MILLIGRAM(S): 60 TABLET, EXTENDED RELEASE ORAL at 13:21

## 2025-03-01 RX ADMIN — RANOLAZINE 500 MILLIGRAM(S): 1000 TABLET, FILM COATED, EXTENDED RELEASE ORAL at 17:15

## 2025-03-01 RX ADMIN — HEPARIN SODIUM 5000 UNIT(S): 1000 INJECTION INTRAVENOUS; SUBCUTANEOUS at 05:03

## 2025-03-01 RX ADMIN — LIDOCAINE HYDROCHLORIDE 1 PATCH: 20 JELLY TOPICAL at 13:21

## 2025-03-01 RX ADMIN — CLOPIDOGREL BISULFATE 75 MILLIGRAM(S): 75 TABLET, FILM COATED ORAL at 13:21

## 2025-03-01 RX ADMIN — ATORVASTATIN CALCIUM 40 MILLIGRAM(S): 80 TABLET, FILM COATED ORAL at 21:08

## 2025-03-01 RX ADMIN — RANOLAZINE 500 MILLIGRAM(S): 1000 TABLET, FILM COATED, EXTENDED RELEASE ORAL at 05:03

## 2025-03-01 RX ADMIN — Medication 650 MILLIGRAM(S): at 06:00

## 2025-03-01 RX ADMIN — POLYETHYLENE GLYCOL 3350 17 GRAM(S): 17 POWDER, FOR SOLUTION ORAL at 13:23

## 2025-03-01 NOTE — PHYSICAL THERAPY INITIAL EVALUATION ADULT - GENERAL OBSERVATIONS, REHAB EVAL
Pt encountered semi-fowlers in bed w/ NAD, AxOx2, confused, +condom cath; reports 5/10 LBP w/ activity. Family arrived at conclusion of session.

## 2025-03-01 NOTE — PHYSICAL THERAPY INITIAL EVALUATION ADULT - STRENGTHENING, PT EVAL
Pt will improve general strength to 4-/5; enough to improve transfer, stair, and gait efficiency within x4 weeks.

## 2025-03-01 NOTE — PHYSICAL THERAPY INITIAL EVALUATION ADULT - DIAGNOSIS, PT EVAL
Pt lying right side, eyes closed. Pt easily awakens to verbal commands.   Sitter outside of room, unobstructed view of pt.    difficulty walking, impaired balance, general weakness

## 2025-03-01 NOTE — PHYSICAL THERAPY INITIAL EVALUATION ADULT - GAIT DEVIATIONS NOTED, PT EVAL
decreased therese/increased time in double stance/decreased velocity of limb motion/decreased step length/decreased stride length/decreased weight-shifting ability

## 2025-03-01 NOTE — PHYSICAL THERAPY INITIAL EVALUATION ADULT - ADDITIONAL COMMENTS
Pt reports he lives in pvt house alone w/ 3-4 NICOLE (no rail) + 8 additional steps inside w/ R rail. Owns SAC + RW. Modified independent w/ all ADL's and functional mobility. Chronic LBP for years (RLE>LLE).

## 2025-03-01 NOTE — PHYSICAL THERAPY INITIAL EVALUATION ADULT - NS ASR RISK AREAS PT EVAL
Billing Type: Third-Party Bill Electrodesiccation And Curettage Text: The wound bed was treated with electrodesiccation and curettage after the biopsy was performed. Biopsy Type: H and E Type Of Destruction Used: Curettage X Size Of Lesion In Cm: 0 Dressing: bandage Anesthesia Volume In Cc (Will Not Render If 0): 0.5 Was A Bandage Applied: Yes Silver Nitrate Text: The wound bed was treated with silver nitrate after the biopsy was performed. Post-Care Instructions: I reviewed with the patient in detail post-care instructions. Patient is to keep the biopsy site dry overnight, and then apply bacitracin twice daily until healed. Patient may apply hydrogen peroxide soaks to remove any crusting. Notification Instructions: Patient will be notified of biopsy results. However, patient instructed to call the office if not contacted within 2 weeks. Hemostasis: Monopolar electrocoagulation Consent: Risks were reviewed including but not limited to scarring, infection, bleeding, scabbing, incomplete removal, nerve damage and allergy to anesthesia. Detail Level: Detailed Biopsy Method: Personna blade Lab: [id number] Bill For Surgical Tray: no Size Of Lesion In Cm: 0.6 Anesthesia Type: 1% lidocaine with epinephrine Lab Facility: [id number] Cryotherapy Text: The wound bed was treated with cryotherapy after the biopsy was performed. fall/impaired judgment/safety awareness Wound Care: Bacitracin Depth Of Biopsy: dermis Electrodesiccation Text: The wound bed was treated with electrodesiccation after the biopsy was performed. Lab: [id number] Size Of Lesion In Cm: 0.4 Lab Facility: [id number] Billing Type: Third-Party Bill

## 2025-03-01 NOTE — PHYSICAL THERAPY INITIAL EVALUATION ADULT - IMPAIRMENTS FOUND, PT EVAL
aerobic capacity/endurance/ergonomics and body mechanics/gait, locomotion, and balance/gross motor/muscle strength/ROM

## 2025-03-01 NOTE — PROGRESS NOTE ADULT - ASSESSMENT
89-year-old male with past medical history of hypertension, CAD status post stents, PAD, sciatica, hyperlipidemia who presented to the ED complaining of right-sided back pain for a few weeks to the point that the patient can no longer walk or take care of ADLs.     CT thoracic/lumbar spine revealed multilevel spondylosis + canal stenosis and foraminal stenosis.      per my colleague's documentation "Given the patient ambulatory dysfunction and living condition, admit to medicine for further management.    PT quin pending.     Added Seroquel 50 mg at night and 25 mg bid prn agitation. EKG ordered.     Continue home meds: Renexa 500 mg bid, Plavix 75 mg/day, Lipitor 40 mg/day, Toprol 25 mg/day.     Addendum: Spoke to daughter today [place] at [phone].  All questions were answered.     UA was sent. "  3/1/2025 await PT quin

## 2025-03-01 NOTE — PHYSICAL THERAPY INITIAL EVALUATION ADULT - TRANSFER SAFETY CONCERNS NOTED: SIT/STAND, REHAB EVAL
stepping too far from assistive device/decreased balance during turns/decreased safety awareness/losing balance/decreased sequencing ability/decreased step length/decreased weight-shifting ability

## 2025-03-01 NOTE — PHYSICAL THERAPY INITIAL EVALUATION ADULT - RANGE OF MOTION EXAMINATION, REHAB EVAL
T/s + L/s ROM impaired secondary to chronic joint issues + stenosis./bilateral upper extremity ROM was WFL (within functional limits)/bilateral lower extremity ROM was WFL (within functional limits)/deficits as listed below

## 2025-03-02 LAB
ANION GAP SERPL CALC-SCNC: 5 MMOL/L — SIGNIFICANT CHANGE UP (ref 5–17)
BUN SERPL-MCNC: 17 MG/DL — SIGNIFICANT CHANGE UP (ref 7–23)
CALCIUM SERPL-MCNC: 9.6 MG/DL — SIGNIFICANT CHANGE UP (ref 8.5–10.1)
CHLORIDE SERPL-SCNC: 109 MMOL/L — HIGH (ref 96–108)
CO2 SERPL-SCNC: 26 MMOL/L — SIGNIFICANT CHANGE UP (ref 22–31)
CREAT SERPL-MCNC: 1.44 MG/DL — HIGH (ref 0.5–1.3)
EGFR: 46 ML/MIN/1.73M2 — LOW
EGFR: 46 ML/MIN/1.73M2 — LOW
GLUCOSE SERPL-MCNC: 81 MG/DL — SIGNIFICANT CHANGE UP (ref 70–99)
HCT VFR BLD CALC: 41.7 % — SIGNIFICANT CHANGE UP (ref 39–50)
HGB BLD-MCNC: 13.2 G/DL — SIGNIFICANT CHANGE UP (ref 13–17)
MAGNESIUM SERPL-MCNC: 2.1 MG/DL — SIGNIFICANT CHANGE UP (ref 1.6–2.6)
MCHC RBC-ENTMCNC: 29.7 PG — SIGNIFICANT CHANGE UP (ref 27–34)
MCHC RBC-ENTMCNC: 31.7 G/DL — LOW (ref 32–36)
MCV RBC AUTO: 93.7 FL — SIGNIFICANT CHANGE UP (ref 80–100)
NRBC BLD AUTO-RTO: 0 /100 WBCS — SIGNIFICANT CHANGE UP (ref 0–0)
PHOSPHATE SERPL-MCNC: 2.5 MG/DL — SIGNIFICANT CHANGE UP (ref 2.5–4.5)
PLATELET # BLD AUTO: 193 K/UL — SIGNIFICANT CHANGE UP (ref 150–400)
POTASSIUM SERPL-MCNC: 4 MMOL/L — SIGNIFICANT CHANGE UP (ref 3.5–5.3)
POTASSIUM SERPL-SCNC: 4 MMOL/L — SIGNIFICANT CHANGE UP (ref 3.5–5.3)
RBC # BLD: 4.45 M/UL — SIGNIFICANT CHANGE UP (ref 4.2–5.8)
RBC # FLD: 15 % — HIGH (ref 10.3–14.5)
SODIUM SERPL-SCNC: 140 MMOL/L — SIGNIFICANT CHANGE UP (ref 135–145)
WBC # BLD: 4.06 K/UL — SIGNIFICANT CHANGE UP (ref 3.8–10.5)
WBC # FLD AUTO: 4.06 K/UL — SIGNIFICANT CHANGE UP (ref 3.8–10.5)

## 2025-03-02 PROCEDURE — 99231 SBSQ HOSP IP/OBS SF/LOW 25: CPT

## 2025-03-02 RX ORDER — DIPHENHYDRAMINE HCL 12.5MG/5ML
12.5 ELIXIR ORAL ONCE
Refills: 0 | Status: COMPLETED | OUTPATIENT
Start: 2025-03-02 | End: 2025-03-02

## 2025-03-02 RX ORDER — OLANZAPINE 10 MG/1
5 TABLET ORAL ONCE
Refills: 0 | Status: COMPLETED | OUTPATIENT
Start: 2025-03-02 | End: 2025-03-02

## 2025-03-02 RX ADMIN — RANOLAZINE 500 MILLIGRAM(S): 1000 TABLET, FILM COATED, EXTENDED RELEASE ORAL at 17:44

## 2025-03-02 RX ADMIN — ATORVASTATIN CALCIUM 40 MILLIGRAM(S): 80 TABLET, FILM COATED ORAL at 21:29

## 2025-03-02 RX ADMIN — METOPROLOL SUCCINATE 25 MILLIGRAM(S): 50 TABLET, EXTENDED RELEASE ORAL at 05:45

## 2025-03-02 RX ADMIN — TRAMADOL HYDROCHLORIDE 25 MILLIGRAM(S): 50 TABLET, FILM COATED ORAL at 13:16

## 2025-03-02 RX ADMIN — Medication 12.5 MILLIGRAM(S): at 20:39

## 2025-03-02 RX ADMIN — TRAMADOL HYDROCHLORIDE 25 MILLIGRAM(S): 50 TABLET, FILM COATED ORAL at 12:16

## 2025-03-02 RX ADMIN — LIDOCAINE HYDROCHLORIDE 1 PATCH: 20 JELLY TOPICAL at 12:17

## 2025-03-02 RX ADMIN — CLOPIDOGREL BISULFATE 75 MILLIGRAM(S): 75 TABLET, FILM COATED ORAL at 12:16

## 2025-03-02 RX ADMIN — ISOSORBIDE MONONITRATE 30 MILLIGRAM(S): 60 TABLET, EXTENDED RELEASE ORAL at 12:16

## 2025-03-02 RX ADMIN — QUETIAPINE FUMARATE 25 MILLIGRAM(S): 25 TABLET ORAL at 17:44

## 2025-03-02 RX ADMIN — RANOLAZINE 500 MILLIGRAM(S): 1000 TABLET, FILM COATED, EXTENDED RELEASE ORAL at 05:45

## 2025-03-02 RX ADMIN — HEPARIN SODIUM 5000 UNIT(S): 1000 INJECTION INTRAVENOUS; SUBCUTANEOUS at 05:44

## 2025-03-02 RX ADMIN — LIDOCAINE HYDROCHLORIDE 1 PATCH: 20 JELLY TOPICAL at 00:28

## 2025-03-02 RX ADMIN — POLYETHYLENE GLYCOL 3350 17 GRAM(S): 17 POWDER, FOR SOLUTION ORAL at 12:16

## 2025-03-02 RX ADMIN — QUETIAPINE FUMARATE 25 MILLIGRAM(S): 25 TABLET ORAL at 05:45

## 2025-03-02 RX ADMIN — Medication 2 TABLET(S): at 21:29

## 2025-03-02 RX ADMIN — OLANZAPINE 5 MILLIGRAM(S): 10 TABLET ORAL at 20:29

## 2025-03-02 NOTE — PROGRESS NOTE ADULT - ASSESSMENT
89-year-old male with past medical history of hypertension, CAD status post stents, PAD, sciatica, hyperlipidemia who presented to the ED complaining of right-sided back pain for a few weeks to the point that the patient can no longer walk or take care of ADLs.     CT thoracic/lumbar spine revealed multilevel spondylosis + canal stenosis and foraminal stenosis.      per my colleague's documentation "Given the patient ambulatory dysfunction and living condition, admit to medicine for further management.    PT eval pending.     Added Seroquel 50 mg at night and 25 mg bid prn agitation. EKG ordered.     Continue home meds: Renexa 500 mg bid, Plavix 75 mg/day, Lipitor 40 mg/day, Toprol 25 mg/day.     Addendum: Spoke to daughter today [place] at [phone].  All questions were answered.     UA was sent. "  3/1/2025 await PT quin   d/w jarad at bedside    3/2/2025 PT quin done case discus during IDRs  to start rehab d/c planning

## 2025-03-03 PROCEDURE — 99222 1ST HOSP IP/OBS MODERATE 55: CPT

## 2025-03-03 PROCEDURE — 99232 SBSQ HOSP IP/OBS MODERATE 35: CPT

## 2025-03-03 PROCEDURE — 93010 ELECTROCARDIOGRAM REPORT: CPT

## 2025-03-03 RX ORDER — CEFTRIAXONE 500 MG/1
INJECTION, POWDER, FOR SOLUTION INTRAMUSCULAR; INTRAVENOUS
Refills: 0 | Status: DISCONTINUED | OUTPATIENT
Start: 2025-03-03 | End: 2025-03-06

## 2025-03-03 RX ORDER — CEFTRIAXONE 500 MG/1
1000 INJECTION, POWDER, FOR SOLUTION INTRAMUSCULAR; INTRAVENOUS EVERY 24 HOURS
Refills: 0 | Status: DISCONTINUED | OUTPATIENT
Start: 2025-03-04 | End: 2025-03-06

## 2025-03-03 RX ORDER — CEFTRIAXONE 500 MG/1
1000 INJECTION, POWDER, FOR SOLUTION INTRAMUSCULAR; INTRAVENOUS ONCE
Refills: 0 | Status: COMPLETED | OUTPATIENT
Start: 2025-03-03 | End: 2025-03-03

## 2025-03-03 RX ADMIN — LIDOCAINE HYDROCHLORIDE 1 PATCH: 20 JELLY TOPICAL at 00:30

## 2025-03-03 RX ADMIN — TRAMADOL HYDROCHLORIDE 25 MILLIGRAM(S): 50 TABLET, FILM COATED ORAL at 06:14

## 2025-03-03 RX ADMIN — Medication 650 MILLIGRAM(S): at 07:44

## 2025-03-03 RX ADMIN — HEPARIN SODIUM 5000 UNIT(S): 1000 INJECTION INTRAVENOUS; SUBCUTANEOUS at 05:10

## 2025-03-03 RX ADMIN — ISOSORBIDE MONONITRATE 30 MILLIGRAM(S): 60 TABLET, EXTENDED RELEASE ORAL at 18:26

## 2025-03-03 RX ADMIN — CEFTRIAXONE 1000 MILLIGRAM(S): 500 INJECTION, POWDER, FOR SOLUTION INTRAMUSCULAR; INTRAVENOUS at 09:39

## 2025-03-03 RX ADMIN — POLYETHYLENE GLYCOL 3350 17 GRAM(S): 17 POWDER, FOR SOLUTION ORAL at 18:25

## 2025-03-03 RX ADMIN — CLOPIDOGREL BISULFATE 75 MILLIGRAM(S): 75 TABLET, FILM COATED ORAL at 18:26

## 2025-03-03 RX ADMIN — QUETIAPINE FUMARATE 25 MILLIGRAM(S): 25 TABLET ORAL at 05:10

## 2025-03-03 RX ADMIN — HEPARIN SODIUM 5000 UNIT(S): 1000 INJECTION INTRAVENOUS; SUBCUTANEOUS at 18:27

## 2025-03-03 RX ADMIN — RANOLAZINE 500 MILLIGRAM(S): 1000 TABLET, FILM COATED, EXTENDED RELEASE ORAL at 05:10

## 2025-03-03 RX ADMIN — METOPROLOL SUCCINATE 25 MILLIGRAM(S): 50 TABLET, EXTENDED RELEASE ORAL at 05:10

## 2025-03-03 RX ADMIN — QUETIAPINE FUMARATE 25 MILLIGRAM(S): 25 TABLET ORAL at 18:26

## 2025-03-03 RX ADMIN — TRAMADOL HYDROCHLORIDE 25 MILLIGRAM(S): 50 TABLET, FILM COATED ORAL at 05:14

## 2025-03-03 RX ADMIN — RANOLAZINE 500 MILLIGRAM(S): 1000 TABLET, FILM COATED, EXTENDED RELEASE ORAL at 18:25

## 2025-03-03 NOTE — BH CONSULTATION LIAISON ASSESSMENT NOTE - HPI (INCLUDE ILLNESS QUALITY, SEVERITY, DURATION, TIMING, CONTEXT, MODIFYING FACTORS, ASSOCIATED SIGNS AND SYMPTOMS)
88 yo M, retired, noncaregiver, lives alone with family helping out, charted hx of dementia (2021), PMH of CAD, hx of acute hypoxic respiratory failure due to COVID-19 pneumonia, hx of dysphagia / Severe protein-calorie malnutrition, chronic urinary incontinence, severe degenerative spine disease (MRI in 2021 showed extensive degenerative changes with underlying thoracolumbar scoliosis, severe foraminal narrowing at multiple levels, with moderate to severe central and recess narrowing in the mid and lower lumbar region, BIBE from home for severe back pain, difficulty ambulating. As per chart, Patient does not wish to pursue surgery at this time for his lumbar stenosis, Ortho recommending adding Flexeril, prednisone to pain regimen,     ISTOP Reference #: [id number] no record of Patient  88 yo M, retired, noncaregiver, lives alone with family helping out, charted hx of dementia (2021), PMH of CAD, hx of acute hypoxic respiratory failure due to COVID-19 pneumonia, hx of dysphagia / Severe protein-calorie malnutrition, chronic urinary incontinence, severe degenerative spine disease (MRI in 2021 showed extensive degenerative changes with underlying thoracolumbar scoliosis, severe foraminal narrowing at multiple levels, with moderate to severe central and recess narrowing in the mid and lower lumbar region, BIBE from home for severe back pain, difficulty ambulating. As per chart, Patient does not wish to pursue surgery at this time for his lumbar stenosis, Ortho recommending adding Flexeril, prednisone to pain regimen,     ISTOP Reference #: [id number] no record of Patient     EXAM: in deep sleep in bed, looks comfortable, cannot be interviewed at this time given level of sedation

## 2025-03-03 NOTE — BH CONSULTATION LIAISON ASSESSMENT NOTE - NSBHCHARTREVIEWVS_PSY_A_CORE FT
Vital Signs Last 24 Hrs  T(C): 36.8 (03 Mar 2025 10:42), Max: 36.9 (02 Mar 2025 17:45)  T(F): 98.3 (03 Mar 2025 10:42), Max: 98.5 (02 Mar 2025 17:45)  HR: 65 (03 Mar 2025 10:42) (65 - 76)  BP: 110/74 (03 Mar 2025 10:42) (101/67 - 157/82)  BP(mean): --  RR: 17 (03 Mar 2025 10:42) (17 - 18)  SpO2: 98% (03 Mar 2025 10:42) (95% - 98%)    Parameters below as of 02 Mar 2025 21:00  Patient On (Oxygen Delivery Method): room air

## 2025-03-03 NOTE — CHART NOTE - NSCHARTNOTEFT_GEN_A_CORE
Medicine attending Dr. Allen requesting options for nonoperative treatment as the patient does not wish to pursue surgery at this time for his lumbar stenosis. Can consider adding flexeril, prednisone to pain regimen, FARHAT for rehab per PT eval, followup outpatient with Dr. Meek for potential SHANKAR and further discussion. Can also consider obtaining MRI lumbar spine while inpatient for followup purposes.

## 2025-03-03 NOTE — PROGRESS NOTE ADULT - ASSESSMENT
89-year-old male with past medical history of hypertension, CAD status post stents, PAD, sciatica, hyperlipidemia who presented to the ED complaining of right-sided back pain for a few weeks to the point that the patient can no longer walk or take care of ADLs.     CT thoracic/lumbar spine revealed multilevel spondylosis + canal stenosis and foraminal stenosis.      per my colleague's documentation "Given the patient ambulatory dysfunction and living condition, admit to medicine for further management.    PT eval pending.     Added Seroquel 50 mg at night and 25 mg bid prn agitation. EKG ordered.     Continue home meds: Renexa 500 mg bid, Plavix 75 mg/day, Lipitor 40 mg/day, Toprol 25 mg/day.     Addendum: Spoke to daughter today [place] at [phone].  All questions were answered.     UA was sent. "  3/1/2025 await PT eval   d/w jarad at bedside    3/2/2025 PT eval done case discus during IDRs  to start rehab d/c planning   3/3/2025 urine- appears c/w uti- will get urine cx, start antibx   regarding Cervical stenosis/ Intractable pain  CT cervical neck  MRI cervical neck  Morphine 2mg IM [id number] PRN pain 7-10  Percocet 5/325 mg po [id number] PRN pain 4-7  Tylenol 650 mg po [id number] PRN pain 1-3.  Flexeril 10mg po TID   Neurosurgery called by ED. Awaiting further recommendations from NeuroSurg.  Imaging as per Neuro Surgery. Patient follows with Dr. Cloud.  Consider Neurology evaluation- appreciate ortho chart note . emerita teague declines operative management and wishes for conservative approach .    consider psych consult - for his confusion but could also be related  to uti

## 2025-03-03 NOTE — BH CONSULTATION LIAISON ASSESSMENT NOTE - MSE UNSTRUCTURED FT
in deep sleep in bed, looks comfortable, cannot be interviewed at this time given level of sedation

## 2025-03-03 NOTE — BH CONSULTATION LIAISON ASSESSMENT NOTE - RISK ASSESSMENT
low risk for intentional, planned out and executed harm to self or others given highly advanced age, physical frailty and multi-domain cognitive deficits. More likely to unintentionally/accidentally lash out at caretakers during ADL assistance or hurt self by trying to climb out of bed/pulls lines etc secondary to his chronically confused state.

## 2025-03-03 NOTE — BH CONSULTATION LIAISON ASSESSMENT NOTE - CURRENT MEDICATION
MEDICATIONS  (STANDING):  atorvastatin 40 milliGRAM(s) Oral at bedtime  cefTRIAXone   IVPB      clopidogrel Tablet 75 milliGRAM(s) Oral daily  heparin   Injectable 5000 Unit(s) SubCutaneous every 12 hours  isosorbide   mononitrate ER Tablet (IMDUR) 30 milliGRAM(s) Oral daily  lidocaine   4% Patch 1 Patch Transdermal daily  metoprolol succinate ER 25 milliGRAM(s) Oral daily  polyethylene glycol 3350 17 Gram(s) Oral daily  QUEtiapine 25 milliGRAM(s) Oral two times a day  ranolazine 500 milliGRAM(s) Oral two times a day  senna 2 Tablet(s) Oral at bedtime    MEDICATIONS  (PRN):  acetaminophen     Tablet .. 650 milliGRAM(s) Oral every 6 hours PRN Temp greater or equal to 38C (100.4F), Mild Pain (1 - 3)  melatonin 3 milliGRAM(s) Oral at bedtime PRN Insomnia  traMADol 25 milliGRAM(s) Oral every 6 hours PRN Severe Pain (7 - 10)

## 2025-03-03 NOTE — PHARMACOTHERAPY INTERVENTION NOTE - COMMENTS
Recommended to obtain EKG since quetiapine is a QTc prolonging agent. 
Reviewed and assessed patient profile in accordance with age friendly measure; patient has been tolerating the medication and pain is controlled with the current regimen. Recommended to continue to practice caution in geriatric population with high-risk medications.

## 2025-03-04 LAB
CULTURE RESULTS: ABNORMAL
SPECIMEN SOURCE: SIGNIFICANT CHANGE UP

## 2025-03-04 PROCEDURE — 99231 SBSQ HOSP IP/OBS SF/LOW 25: CPT

## 2025-03-04 RX ORDER — OLANZAPINE 10 MG/1
2.5 TABLET ORAL
Refills: 0 | Status: DISCONTINUED | OUTPATIENT
Start: 2025-03-04 | End: 2025-03-06

## 2025-03-04 RX ADMIN — CEFTRIAXONE 100 MILLIGRAM(S): 500 INJECTION, POWDER, FOR SOLUTION INTRAMUSCULAR; INTRAVENOUS at 12:27

## 2025-03-04 RX ADMIN — QUETIAPINE FUMARATE 25 MILLIGRAM(S): 25 TABLET ORAL at 05:04

## 2025-03-04 RX ADMIN — RANOLAZINE 500 MILLIGRAM(S): 1000 TABLET, FILM COATED, EXTENDED RELEASE ORAL at 05:05

## 2025-03-04 RX ADMIN — ATORVASTATIN CALCIUM 40 MILLIGRAM(S): 80 TABLET, FILM COATED ORAL at 22:52

## 2025-03-04 RX ADMIN — Medication 2 TABLET(S): at 22:52

## 2025-03-04 RX ADMIN — HEPARIN SODIUM 5000 UNIT(S): 1000 INJECTION INTRAVENOUS; SUBCUTANEOUS at 18:05

## 2025-03-04 RX ADMIN — ISOSORBIDE MONONITRATE 30 MILLIGRAM(S): 60 TABLET, EXTENDED RELEASE ORAL at 12:26

## 2025-03-04 RX ADMIN — LIDOCAINE HYDROCHLORIDE 1 PATCH: 20 JELLY TOPICAL at 19:45

## 2025-03-04 RX ADMIN — LIDOCAINE HYDROCHLORIDE 1 PATCH: 20 JELLY TOPICAL at 12:27

## 2025-03-04 RX ADMIN — HEPARIN SODIUM 5000 UNIT(S): 1000 INJECTION INTRAVENOUS; SUBCUTANEOUS at 05:09

## 2025-03-04 RX ADMIN — RANOLAZINE 500 MILLIGRAM(S): 1000 TABLET, FILM COATED, EXTENDED RELEASE ORAL at 18:03

## 2025-03-04 RX ADMIN — METOPROLOL SUCCINATE 25 MILLIGRAM(S): 50 TABLET, EXTENDED RELEASE ORAL at 05:05

## 2025-03-04 RX ADMIN — Medication 1 LOZENGE: at 05:05

## 2025-03-04 RX ADMIN — TRAMADOL HYDROCHLORIDE 25 MILLIGRAM(S): 50 TABLET, FILM COATED ORAL at 05:07

## 2025-03-04 RX ADMIN — OLANZAPINE 2.5 MILLIGRAM(S): 10 TABLET ORAL at 18:05

## 2025-03-04 RX ADMIN — CLOPIDOGREL BISULFATE 75 MILLIGRAM(S): 75 TABLET, FILM COATED ORAL at 12:26

## 2025-03-04 NOTE — BH CONSULTATION LIAISON PROGRESS NOTE - OTHER
"I am upset!"  concerned  adequate  low end of fair  has reactivity  deferred  tenuous  paranoid ideation towards staff  impaired at this time

## 2025-03-04 NOTE — PROGRESS NOTE ADULT - ASSESSMENT
89-year-old male with past medical history of hypertension, CAD status post stents, PAD, sciatica, hyperlipidemia who presented to the ED complaining of right-sided back pain for a few weeks to the point that the patient can no longer walk or take care of ADLs.     CT thoracic/lumbar spine revealed multilevel spondylosis + canal stenosis and foraminal stenosis.      per my colleague's documentation "Given the patient ambulatory dysfunction and living condition, admit to medicine for further management.    PT eval pending.     Added Seroquel 50 mg at night and 25 mg bid prn agitation. EKG ordered.     Continue home meds: Renexa 500 mg bid, Plavix 75 mg/day, Lipitor 40 mg/day, Toprol 25 mg/day.     Addendum: Spoke to daughter today [place] at [phone].  All questions were answered.     UA was sent. "  3/1/2025 await PT eval   d/w jarad at bedside    3/2/2025 PT eval done case discus during IDRs  to start rehab d/c planning   3/3/2025 urine- appears c/w uti- will get urine cx, start antibx  3/4/2025 f/u urine cx--continue with antibx     . daughter zahida declines operative management and wishes for conservative approach .    consider psych consult - for his confusion but could also be related  to Urinary Tract Infection-  3/4/2025 appreciate psych consult --no prolonged QTC seen on ekg

## 2025-03-04 NOTE — BH CONSULTATION LIAISON PROGRESS NOTE - NSBHCONSULTRECOMMENDOTHER_PSY_A_CORE FT
3/3/25: continue Seroquel 25mg PO bid, Zyprexa 5mg IM q6hrs PRN for severe agitation   3/4/25: pain is a big factor in restlessness, agitation. Try standing tramadol at 50mg PO bid with goal to control pain so that Patient is comfortable and doesnot need any PRNs  3/3/25: continue Seroquel 25mg PO bid, Zyprexa 5mg IM q6hrs PRN for severe agitation   3/4/25: pain is a big factor in restlessness, agitation. Try standing tramadol at 50mg PO bid with goal to control pain so that Patient is comfortable and doesnot need any PRNs   - consider Cardiology consult  3/3/25: continue Seroquel 25mg PO bid, Zyprexa 5mg IM q6hrs PRN for severe agitation   3/4/25: pain is a big factor in restlessness, agitation. Try standing tramadol at 50mg PO bid with goal to control pain so that Patient is comfortable and doesnot need any PRNs   - consider Cardiology consult   3/5/25: discontinue Seroquel and start Zyprexa 2.5mg PO bid for paranoia

## 2025-03-04 NOTE — BH CONSULTATION LIAISON PROGRESS NOTE - NSBHFUPINTERVALHXFT_PSY_A_CORE
Patient on IV Rocephin for UTI, using PRNs of tramadol for back pain. No subsequent agitation since last seen. Medication compliant.  Patient on IV Rocephin for UTI, using PRNs of tramadol for back pain. No subsequent agitation since last seen. Medication compliant.  Repeat EKG showing again bifascicular block.  Patient on IV Rocephin for UTI, using PRNs of tramadol for back pain. No subsequent agitation since last seen. Medication compliant.  Repeat EKG showing again bifascicular block. EXAM: awake, alert, sitting in chair next to bed, disrobed and only in his underwear, on his cell phone with the police precinct to report "I am being kept against my will here" and 'I am being harassed." Patient reassured, reorientation given, emotional support done. Asked staff to put Patient back to bed as he was upset about the [place] belt while sitting in bed.

## 2025-03-05 LAB
ANION GAP SERPL CALC-SCNC: 5 MMOL/L — SIGNIFICANT CHANGE UP (ref 5–17)
BUN SERPL-MCNC: 16 MG/DL — SIGNIFICANT CHANGE UP (ref 7–23)
CALCIUM SERPL-MCNC: 9.9 MG/DL — SIGNIFICANT CHANGE UP (ref 8.5–10.1)
CHLORIDE SERPL-SCNC: 104 MMOL/L — SIGNIFICANT CHANGE UP (ref 96–108)
CO2 SERPL-SCNC: 30 MMOL/L — SIGNIFICANT CHANGE UP (ref 22–31)
CREAT SERPL-MCNC: 1.6 MG/DL — HIGH (ref 0.5–1.3)
EGFR: 41 ML/MIN/1.73M2 — LOW
EGFR: 41 ML/MIN/1.73M2 — LOW
GLUCOSE SERPL-MCNC: 78 MG/DL — SIGNIFICANT CHANGE UP (ref 70–99)
HCT VFR BLD CALC: 46.5 % — SIGNIFICANT CHANGE UP (ref 39–50)
HGB BLD-MCNC: 14.8 G/DL — SIGNIFICANT CHANGE UP (ref 13–17)
MAGNESIUM SERPL-MCNC: 2.3 MG/DL — SIGNIFICANT CHANGE UP (ref 1.6–2.6)
MCHC RBC-ENTMCNC: 30.3 PG — SIGNIFICANT CHANGE UP (ref 27–34)
MCHC RBC-ENTMCNC: 31.8 G/DL — LOW (ref 32–36)
MCV RBC AUTO: 95.3 FL — SIGNIFICANT CHANGE UP (ref 80–100)
NRBC BLD AUTO-RTO: 0 /100 WBCS — SIGNIFICANT CHANGE UP (ref 0–0)
PHOSPHATE SERPL-MCNC: 2.9 MG/DL — SIGNIFICANT CHANGE UP (ref 2.5–4.5)
PLATELET # BLD AUTO: 216 K/UL — SIGNIFICANT CHANGE UP (ref 150–400)
POTASSIUM SERPL-MCNC: 4 MMOL/L — SIGNIFICANT CHANGE UP (ref 3.5–5.3)
POTASSIUM SERPL-SCNC: 4 MMOL/L — SIGNIFICANT CHANGE UP (ref 3.5–5.3)
RBC # BLD: 4.88 M/UL — SIGNIFICANT CHANGE UP (ref 4.2–5.8)
RBC # FLD: 15.1 % — HIGH (ref 10.3–14.5)
SODIUM SERPL-SCNC: 139 MMOL/L — SIGNIFICANT CHANGE UP (ref 135–145)
WBC # BLD: 5.4 K/UL — SIGNIFICANT CHANGE UP (ref 3.8–10.5)
WBC # FLD AUTO: 5.4 K/UL — SIGNIFICANT CHANGE UP (ref 3.8–10.5)

## 2025-03-05 PROCEDURE — 99232 SBSQ HOSP IP/OBS MODERATE 35: CPT

## 2025-03-05 PROCEDURE — 99231 SBSQ HOSP IP/OBS SF/LOW 25: CPT

## 2025-03-05 RX ADMIN — LIDOCAINE HYDROCHLORIDE 1 PATCH: 20 JELLY TOPICAL at 12:00

## 2025-03-05 RX ADMIN — TRAMADOL HYDROCHLORIDE 25 MILLIGRAM(S): 50 TABLET, FILM COATED ORAL at 06:13

## 2025-03-05 RX ADMIN — CLOPIDOGREL BISULFATE 75 MILLIGRAM(S): 75 TABLET, FILM COATED ORAL at 11:59

## 2025-03-05 RX ADMIN — OLANZAPINE 2.5 MILLIGRAM(S): 10 TABLET ORAL at 06:11

## 2025-03-05 RX ADMIN — Medication 2 TABLET(S): at 21:51

## 2025-03-05 RX ADMIN — CEFTRIAXONE 100 MILLIGRAM(S): 500 INJECTION, POWDER, FOR SOLUTION INTRAMUSCULAR; INTRAVENOUS at 16:09

## 2025-03-05 RX ADMIN — ATORVASTATIN CALCIUM 40 MILLIGRAM(S): 80 TABLET, FILM COATED ORAL at 21:51

## 2025-03-05 RX ADMIN — HEPARIN SODIUM 5000 UNIT(S): 1000 INJECTION INTRAVENOUS; SUBCUTANEOUS at 06:11

## 2025-03-05 RX ADMIN — RANOLAZINE 500 MILLIGRAM(S): 1000 TABLET, FILM COATED, EXTENDED RELEASE ORAL at 06:11

## 2025-03-05 RX ADMIN — METOPROLOL SUCCINATE 25 MILLIGRAM(S): 50 TABLET, EXTENDED RELEASE ORAL at 06:11

## 2025-03-05 RX ADMIN — RANOLAZINE 500 MILLIGRAM(S): 1000 TABLET, FILM COATED, EXTENDED RELEASE ORAL at 17:56

## 2025-03-05 RX ADMIN — OLANZAPINE 2.5 MILLIGRAM(S): 10 TABLET ORAL at 18:00

## 2025-03-05 RX ADMIN — ISOSORBIDE MONONITRATE 30 MILLIGRAM(S): 60 TABLET, EXTENDED RELEASE ORAL at 11:59

## 2025-03-05 RX ADMIN — LIDOCAINE HYDROCHLORIDE 1 PATCH: 20 JELLY TOPICAL at 01:21

## 2025-03-05 RX ADMIN — HEPARIN SODIUM 5000 UNIT(S): 1000 INJECTION INTRAVENOUS; SUBCUTANEOUS at 18:02

## 2025-03-05 RX ADMIN — LIDOCAINE HYDROCHLORIDE 1 PATCH: 20 JELLY TOPICAL at 19:21

## 2025-03-05 NOTE — BH CONSULTATION LIAISON PROGRESS NOTE - NSBHFUPINTERVALHXFT_PSY_A_CORE
No subsequent agitation since last seen. Medication compliant; started on the Zyprexa, tolerating it without any adverse medication side effects reported or manifested.  Repeat EKG showing again bifascicular block; QTc normal at 422. EXAM: awake, alert, sitting in chair next to bed, more calm, cooperative today and less preoccupied / less paranoid.

## 2025-03-05 NOTE — BH CONSULTATION LIAISON PROGRESS NOTE - OTHER
some improvement noted  impaired at this time  adequate  concerned  low end of fair  has reactivity  "I want to go home" deferred

## 2025-03-05 NOTE — BH CONSULTATION LIAISON PROGRESS NOTE - NSBHCONSULTRECOMMENDOTHER_PSY_A_CORE FT
3/3/25: continue Seroquel 25mg PO bid, Zyprexa 5mg IM q6hrs PRN for severe agitation   - pain is a big factor in restlessness, agitation. Try standing tramadol at 50mg PO bid with goal to control pain so that Patient is comfortable and doesnot need any PRNs   - consider Cardiology consult   3/4/25: discontinue Seroquel and start Zyprexa 2.5mg PO bid for paranoia   3/5/25: continue Zyprexa 2.5mg PO bid for paranoia

## 2025-03-05 NOTE — PROGRESS NOTE ADULT - ASSESSMENT
89-year-old male with past medical history of hypertension, CAD status post stents, PAD, sciatica, hyperlipidemia who presented to the ED complaining of right-sided back pain for a few weeks to the point that the patient can no longer walk or take care of ADLs.     #unsteady gait  CT thoracic/lumbar spine revealed multilevel spondylosis + canal stenosis and foraminal stenosis.    PT evaluated recommending FARHAT    #Delirium   Patient with waxing and waning attention and agitation. Psych following  Will dc seroquel start zyprexa 3/5 per Dr Og recommendation     #UTI  urine cx + Aerococcus urinae  cont CTX    Continue home meds: Renexa 500 mg bid, Plavix 75 mg/day, Lipitor 40 mg/day, Toprol 25 mg/day.     Spoke with daughter at bedside

## 2025-03-06 LAB
ANION GAP SERPL CALC-SCNC: 5 MMOL/L — SIGNIFICANT CHANGE UP (ref 5–17)
BUN SERPL-MCNC: 17 MG/DL — SIGNIFICANT CHANGE UP (ref 7–23)
CALCIUM SERPL-MCNC: 9.6 MG/DL — SIGNIFICANT CHANGE UP (ref 8.5–10.1)
CHLORIDE SERPL-SCNC: 107 MMOL/L — SIGNIFICANT CHANGE UP (ref 96–108)
CO2 SERPL-SCNC: 28 MMOL/L — SIGNIFICANT CHANGE UP (ref 22–31)
CREAT SERPL-MCNC: 1.6 MG/DL — HIGH (ref 0.5–1.3)
EGFR: 41 ML/MIN/1.73M2 — LOW
EGFR: 41 ML/MIN/1.73M2 — LOW
GLUCOSE SERPL-MCNC: 100 MG/DL — HIGH (ref 70–99)
HCT VFR BLD CALC: 44.6 % — SIGNIFICANT CHANGE UP (ref 39–50)
HGB BLD-MCNC: 14.3 G/DL — SIGNIFICANT CHANGE UP (ref 13–17)
MAGNESIUM SERPL-MCNC: 2.3 MG/DL — SIGNIFICANT CHANGE UP (ref 1.6–2.6)
MCHC RBC-ENTMCNC: 30.3 PG — SIGNIFICANT CHANGE UP (ref 27–34)
MCHC RBC-ENTMCNC: 32.1 G/DL — SIGNIFICANT CHANGE UP (ref 32–36)
MCV RBC AUTO: 94.5 FL — SIGNIFICANT CHANGE UP (ref 80–100)
NRBC BLD AUTO-RTO: 0 /100 WBCS — SIGNIFICANT CHANGE UP (ref 0–0)
PHOSPHATE SERPL-MCNC: 3 MG/DL — SIGNIFICANT CHANGE UP (ref 2.5–4.5)
PLATELET # BLD AUTO: 207 K/UL — SIGNIFICANT CHANGE UP (ref 150–400)
POTASSIUM SERPL-MCNC: 4 MMOL/L — SIGNIFICANT CHANGE UP (ref 3.5–5.3)
POTASSIUM SERPL-SCNC: 4 MMOL/L — SIGNIFICANT CHANGE UP (ref 3.5–5.3)
RBC # BLD: 4.72 M/UL — SIGNIFICANT CHANGE UP (ref 4.2–5.8)
RBC # FLD: 15.1 % — HIGH (ref 10.3–14.5)
SODIUM SERPL-SCNC: 140 MMOL/L — SIGNIFICANT CHANGE UP (ref 135–145)
WBC # BLD: 5.2 K/UL — SIGNIFICANT CHANGE UP (ref 3.8–10.5)
WBC # FLD AUTO: 5.2 K/UL — SIGNIFICANT CHANGE UP (ref 3.8–10.5)

## 2025-03-06 PROCEDURE — 99231 SBSQ HOSP IP/OBS SF/LOW 25: CPT

## 2025-03-06 PROCEDURE — 99232 SBSQ HOSP IP/OBS MODERATE 35: CPT

## 2025-03-06 RX ORDER — OLANZAPINE 10 MG/1
2.5 TABLET ORAL DAILY
Refills: 0 | Status: DISCONTINUED | OUTPATIENT
Start: 2025-03-06 | End: 2025-03-07

## 2025-03-06 RX ORDER — OLANZAPINE 10 MG/1
5 TABLET ORAL ONCE
Refills: 0 | Status: COMPLETED | OUTPATIENT
Start: 2025-03-06 | End: 2025-03-06

## 2025-03-06 RX ORDER — OLANZAPINE 10 MG/1
5 TABLET ORAL AT BEDTIME
Refills: 0 | Status: DISCONTINUED | OUTPATIENT
Start: 2025-03-06 | End: 2025-03-07

## 2025-03-06 RX ORDER — SODIUM CHLORIDE 9 G/1000ML
500 INJECTION, SOLUTION INTRAVENOUS ONCE
Refills: 0 | Status: COMPLETED | OUTPATIENT
Start: 2025-03-06 | End: 2025-03-07

## 2025-03-06 RX ORDER — CEFPODOXIME PROXETIL 200 MG/1
100 TABLET, FILM COATED ORAL
Refills: 0 | Status: DISCONTINUED | OUTPATIENT
Start: 2025-03-07 | End: 2025-03-07

## 2025-03-06 RX ADMIN — METOPROLOL SUCCINATE 25 MILLIGRAM(S): 50 TABLET, EXTENDED RELEASE ORAL at 06:08

## 2025-03-06 RX ADMIN — OLANZAPINE 2.5 MILLIGRAM(S): 10 TABLET ORAL at 06:08

## 2025-03-06 RX ADMIN — ISOSORBIDE MONONITRATE 30 MILLIGRAM(S): 60 TABLET, EXTENDED RELEASE ORAL at 12:20

## 2025-03-06 RX ADMIN — CLOPIDOGREL BISULFATE 75 MILLIGRAM(S): 75 TABLET, FILM COATED ORAL at 12:20

## 2025-03-06 RX ADMIN — POLYETHYLENE GLYCOL 3350 17 GRAM(S): 17 POWDER, FOR SOLUTION ORAL at 12:22

## 2025-03-06 RX ADMIN — HEPARIN SODIUM 5000 UNIT(S): 1000 INJECTION INTRAVENOUS; SUBCUTANEOUS at 06:08

## 2025-03-06 RX ADMIN — LIDOCAINE HYDROCHLORIDE 1 PATCH: 20 JELLY TOPICAL at 01:53

## 2025-03-06 RX ADMIN — LIDOCAINE HYDROCHLORIDE 1 PATCH: 20 JELLY TOPICAL at 12:22

## 2025-03-06 RX ADMIN — CEFTRIAXONE 100 MILLIGRAM(S): 500 INJECTION, POWDER, FOR SOLUTION INTRAMUSCULAR; INTRAVENOUS at 09:30

## 2025-03-06 RX ADMIN — RANOLAZINE 500 MILLIGRAM(S): 1000 TABLET, FILM COATED, EXTENDED RELEASE ORAL at 06:08

## 2025-03-06 RX ADMIN — OLANZAPINE 5 MILLIGRAM(S): 10 TABLET ORAL at 22:34

## 2025-03-06 RX ADMIN — LIDOCAINE HYDROCHLORIDE 1 PATCH: 20 JELLY TOPICAL at 19:59

## 2025-03-06 NOTE — BH CONSULTATION LIAISON PROGRESS NOTE - OTHER
some improvement noted  low end of fair  impaired at this time  "I want to go home" deferred  concerned  some improvement noted ; remains suspicious  adequate

## 2025-03-06 NOTE — BH CONSULTATION LIAISON PROGRESS NOTE - NSBHFUPINTERVALHXFT_PSY_A_CORE
No subsequent agitation since last seen. Medication compliant; started on the Zyprexa, tolerating it without any adverse medication side effects reported or manifested with less irritability, paranoia indicating clinical response.  Repeat EKG showing again bifascicular block; QTc normal at 422. EXAM: awake, alert, sitting in chair next to bed, more calm, cooperative today and less preoccupied / less paranoid but remains skeptical, unhappy at being here and distrust of staff and doctors' intention.

## 2025-03-06 NOTE — PROGRESS NOTE ADULT - REASON FOR ADMISSION
Ambulatory dysfunction, acute on chronic back pain
(E4) spontaneous
Ambulatory dysfunction, acute on chronic back pain
Ambulatory dysfunction, acute on chronic back pain

## 2025-03-06 NOTE — PROGRESS NOTE ADULT - ASSESSMENT
89-year-old male with past medical history of hypertension, CAD status post stents, PAD, sciatica, hyperlipidemia who presented to the ED complaining of right-sided back pain for a few weeks to the point that the patient can no longer walk or take care of ADLs.     #unsteady gait  CT thoracic/lumbar spine revealed multilevel spondylosis + canal stenosis and foraminal stenosis.    PT evaluated recommending FARHAT    #Delirium   Patient with waxing and waning attention and agitation. Psych following  Will dc seroquel start zyprexa 3/5 per Dr Og recommendation     #UTI  urine cx + Aerococcus urinae  cont CTX-->will transition to cefinir as patient is having issues with IV    #BERTHA on CKD3a  Baseline Cr ~1.3 now 1.6, encouraged PO but will also give bolus IV fluid    Continue home meds: Renexa 500 mg bid, Plavix 75 mg/day, Lipitor 40 mg/day, Toprol 25 mg/day.              89-year-old male with past medical history of hypertension, CAD status post stents, PAD, sciatica, hyperlipidemia who presented to the ED complaining of right-sided back pain for a few weeks to the point that the patient can no longer walk or take care of ADLs.     #unsteady gait  CT thoracic/lumbar spine revealed multilevel spondylosis + canal stenosis and foraminal stenosis.    PT evaluated recommending FARHAT    #Delirium   Patient with waxing and waning attention and agitation. Psych following  Will dc seroquel start zyprexa 3/5 per Dr Og recommendation     #UTI  urine cx + Aerococcus urinae  cont CTX-->will transition to Vantin EOT 3/8/25 as patient is having issues with IV    #BERTHA on CKD3a  Baseline Cr ~1.3 now 1.6, encouraged PO but will also give bolus IV fluid    Continue home meds: Renexa 500 mg bid, Plavix 75 mg/day, Lipitor 40 mg/day, Toprol 25 mg/day.

## 2025-03-06 NOTE — BH CONSULTATION LIAISON PROGRESS NOTE - NSBHCONSULTRECOMMENDOTHER_PSY_A_CORE FT
3/3/25: continue Seroquel 25mg PO bid, Zyprexa 5mg IM q6hrs PRN for severe agitation   - pain is a big factor in restlessness, agitation. Try standing tramadol at 50mg PO bid with goal to control pain so that Patient is comfortable and doesnot need any PRNs   - consider Cardiology consult   3/4/25: discontinue Seroquel and start Zyprexa 2.5mg PO bid for paranoia   3/5/25: continue Zyprexa 2.5mg PO bid for paranoia  3/6/25: increase Zyprexa to 2.5mg PO in am and 5mg PO qhs for paranoia

## 2025-03-06 NOTE — PROGRESS NOTE ADULT - SUBJECTIVE AND OBJECTIVE BOX
INTERVAL HPI/OVERNIGHT EVENTS:  Pt seen and examined at bedside.     Allergies/Intolerance: gabapentin (Angioedema)  overnight - more confused      Vital Signs Last 24 Hrs  T(C): 36.8 (03 Mar 2025 10:42), Max: 37 (02 Mar 2025 11:12)  T(F): 98.3 (03 Mar 2025 10:42), Max: 98.6 (02 Mar 2025 11:12)  HR: 65 (03 Mar 2025 10:42) (65 - 76)  BP: 110/74 (03 Mar 2025 10:42) (101/67 - 167/82)  BP(mean): --  RR: 17 (03 Mar 2025 10:42) (17 - 19)  SpO2: 98% (03 Mar 2025 10:42) (95% - 98%)    Parameters below as of 02 Mar 2025 21:00  Patient On (Oxygen Delivery Method): room air          GENERAL: in bed, episodes of agitation and anger outbursts.   NECK: supple, No JVD  CHEST/LUNG: clear to auscultation bilaterally; no rales, rhonchi, or wheezing b/l  HEART: normal S1, S2  ABDOMEN: BS+, soft, ND, NT   EXTREMITIES:  pulses palpable; no clubbing, cyanosis, or edema b/l LEs  Neuro- alert and confused       LABS:             Urinalysis Basic - ( 02 Mar 2025 06:05 )    Color: x / Appearance: x / SG: x / pH: x  Gluc: 81 mg/dL / Ketone: x  / Bili: x / Urobili: x   Blood: x / Protein: x / Nitrite: x   Leuk Esterase: x / RBC: x / WBC x   Sq Epi: x / Non Sq Epi: x / Bacteria: x      Urine Microscopic-Add On (NC) (03.01.25 @ 18:02)    White Blood Cell - Urine: 14 /HPF   Red Blood Cell - Urine: 2 /HPF   Bacteria: Many /HPF   Triple Phosphate Crystals: Present            
INTERVAL HPI/OVERNIGHT EVENTS:  Pt seen and examined at bedside.     Allergies/Intolerance: gabapentin (Angioedema)    MEDICATIONS  (STANDING):  atorvastatin 40 milliGRAM(s) Oral at bedtime  clopidogrel Tablet 75 milliGRAM(s) Oral daily  heparin   Injectable 5000 Unit(s) SubCutaneous every 12 hours  isosorbide   mononitrate ER Tablet (IMDUR) 30 milliGRAM(s) Oral daily  lidocaine   4% Patch 1 Patch Transdermal daily  metoprolol succinate ER 25 milliGRAM(s) Oral daily  polyethylene glycol 3350 17 Gram(s) Oral daily  QUEtiapine 25 milliGRAM(s) Oral two times a day  ranolazine 500 milliGRAM(s) Oral two times a day  senna 2 Tablet(s) Oral at bedtime    MEDICATIONS  (PRN):  acetaminophen     Tablet .. 650 milliGRAM(s) Oral every 6 hours PRN Temp greater or equal to 38C (100.4F), Mild Pain (1 - 3)  melatonin 3 milliGRAM(s) Oral at bedtime PRN Insomnia  traMADol 25 milliGRAM(s) Oral every 6 hours PRN Severe Pain (7 - 10)        ROS: all systems reviewed and wnl      Vital Signs Last 24 Hrs  T(C): 37 (02 Mar 2025 11:12), Max: 37 (02 Mar 2025 11:12)  T(F): 98.6 (02 Mar 2025 11:12), Max: 98.6 (02 Mar 2025 11:12)  HR: 67 (02 Mar 2025 11:12) (57 - 90)  BP: 167/82 (02 Mar 2025 11:12) (122/68 - 167/82)  BP(mean): --  RR: 19 (02 Mar 2025 11:12) (14 - 19)  SpO2: 97% (02 Mar 2025 11:12) (96% - 99%)    Parameters below as of 02 Mar 2025 11:12  Patient On (Oxygen Delivery Method): room air        GENERAL: in bed, episodes of agitation and anger outbursts.   NECK: supple, No JVD  CHEST/LUNG: clear to auscultation bilaterally; no rales, rhonchi, or wheezing b/l  HEART: normal S1, S2  ABDOMEN: BS+, soft, ND, NT   EXTREMITIES:  pulses palpable; no clubbing, cyanosis, or edema b/l LEs  Neuro- alert and confused       LABS:                                   13.2   4.06  )-----------( 193      ( 02 Mar 2025 06:05 )             41.7   03-02    140  |  109[H]  |  17  ----------------------------<  81  4.0   |  26  |  1.44[H]    Ca    9.6      02 Mar 2025 06:05  Phos  2.5     03-02  Mg     2.1     03-02      Urinalysis Basic - ( 27 Feb 2025 06:07 )    Color: x / Appearance: x / SG: x / pH: x  Gluc: 88 mg/dL / Ketone: x  / Bili: x / Urobili: x   Blood: x / Protein: x / Nitrite: x   Leuk Esterase: x / RBC: x / WBC x   Sq Epi: x / Non Sq Epi: x / Bacteria: x            
INTERVAL HPI/OVERNIGHT EVENTS:  Pt seen and examined at bedside.     Allergies/Intolerance: gabapentin (Angioedema)  overnight - none        MEDICATIONS  (STANDING):  atorvastatin 40 milliGRAM(s) Oral at bedtime  cefTRIAXone   IVPB 1000 milliGRAM(s) IV Intermittent every 24 hours  cefTRIAXone   IVPB      clopidogrel Tablet 75 milliGRAM(s) Oral daily  heparin   Injectable 5000 Unit(s) SubCutaneous every 12 hours  isosorbide   mononitrate ER Tablet (IMDUR) 30 milliGRAM(s) Oral daily  lidocaine   4% Patch 1 Patch Transdermal daily  metoprolol succinate ER 25 milliGRAM(s) Oral daily  polyethylene glycol 3350 17 Gram(s) Oral daily  QUEtiapine 25 milliGRAM(s) Oral two times a day  ranolazine 500 milliGRAM(s) Oral two times a day  senna 2 Tablet(s) Oral at bedtime    MEDICATIONS  (PRN):  acetaminophen     Tablet .. 650 milliGRAM(s) Oral every 6 hours PRN Temp greater or equal to 38C (100.4F), Mild Pain (1 - 3)  melatonin 3 milliGRAM(s) Oral at bedtime PRN Insomnia  traMADol 25 milliGRAM(s) Oral every 6 hours PRN Severe Pain (7 - 10)        GENERAL: in bed, episodes of agitation and anger outbursts.   NECK: supple, No JVD  CHEST/LUNG: clear to auscultation bilaterally; no rales, rhonchi, or wheezing b/l  HEART: normal S1, S2  ABDOMEN: BS+, soft, ND, NT   EXTREMITIES:  pulses palpable; no clubbing, cyanosis, or edema b/l LEs  Neuro- alert and confused       LABS:             Urinalysis Basic - ( 02 Mar 2025 06:05 )    Color: x / Appearance: x / SG: x / pH: x  Gluc: 81 mg/dL / Ketone: x  / Bili: x / Urobili: x   Blood: x / Protein: x / Nitrite: x   Leuk Esterase: x / RBC: x / WBC x   Sq Epi: x / Non Sq Epi: x / Bacteria: x      Urine Microscopic-Add On (NC) (03.01.25 @ 18:02)    White Blood Cell - Urine: 14 /HPF   Red Blood Cell - Urine: 2 /HPF   Bacteria: Many /HPF   Triple Phosphate Crystals: Present            
Patient is a 89y old  Male who presents with a chief complaint of Ambulatory dysfunction, acute on chronic back pain (05 Mar 2025 18:36)      INTERVAL HPI/OVERNIGHT EVENTS:  -seen and examined at bedside   -nothing acute overnight     MEDICATIONS  (STANDING):  atorvastatin 40 milliGRAM(s) Oral at bedtime  cefTRIAXone   IVPB 1000 milliGRAM(s) IV Intermittent every 24 hours  cefTRIAXone   IVPB      clopidogrel Tablet 75 milliGRAM(s) Oral daily  heparin   Injectable 5000 Unit(s) SubCutaneous every 12 hours  isosorbide   mononitrate ER Tablet (IMDUR) 30 milliGRAM(s) Oral daily  lidocaine   4% Patch 1 Patch Transdermal daily  metoprolol succinate ER 25 milliGRAM(s) Oral daily  OLANZapine 2.5 milliGRAM(s) Oral two times a day  polyethylene glycol 3350 17 Gram(s) Oral daily  ranolazine 500 milliGRAM(s) Oral two times a day  senna 2 Tablet(s) Oral at bedtime    MEDICATIONS  (PRN):  acetaminophen     Tablet .. 650 milliGRAM(s) Oral every 6 hours PRN Temp greater or equal to 38C (100.4F), Mild Pain (1 - 3)  melatonin 3 milliGRAM(s) Oral at bedtime PRN Insomnia  traMADol 25 milliGRAM(s) Oral every 6 hours PRN Severe Pain (7 - 10)      Allergies    gabapentin (Angioedema)    Intolerances        REVIEW OF SYSTEMS:  limited due to dementia, no pain or SOB    Vital Signs Last 24 Hrs  T(C): 36.8 (06 Mar 2025 11:16), Max: 37.1 (05 Mar 2025 16:09)  T(F): 98.2 (06 Mar 2025 11:16), Max: 98.7 (05 Mar 2025 16:09)  HR: 66 (06 Mar 2025 11:16) (63 - 70)  BP: 123/73 (06 Mar 2025 11:16) (114/73 - 135/80)  BP(mean): --  RR: 16 (06 Mar 2025 11:16) (16 - 18)  SpO2: 97% (06 Mar 2025 11:16) (93% - 97%)    Parameters below as of 06 Mar 2025 00:34  Patient On (Oxygen Delivery Method): room air        PHYSICAL EXAM:  GENERAL: NAD, well-groomed, well-developed  HEAD:  Atraumatic, Normocephalic  EYES: EOMI, sclera non-icteric  ENMT:  Moist mucous membranes  NERVOUS SYSTEM:  Alert & Oriented to name confused re situation and location, +tangential   CHEST/LUNG: Clear to percussion bilaterally; No rales, rhonchi, wheezing, or rubs  HEART: Regular rate and rhythm; No murmurs, rubs, or gallops  ABDOMEN: Soft, Nontender, Nondistended  EXTREMITIES:  no LE edema   SKIN: dry    LABS:                        14.3   5.20  )-----------( 207      ( 06 Mar 2025 10:00 )             44.6     03-06    140  |  107  |  17  ----------------------------<  100[H]  4.0   |  28  |  1.60[H]    Ca    9.6      06 Mar 2025 10:00  Phos  3.0     03-06  Mg     2.3     03-06        Urinalysis Basic - ( 06 Mar 2025 10:00 )    Color: x / Appearance: x / SG: x / pH: x  Gluc: 100 mg/dL / Ketone: x  / Bili: x / Urobili: x   Blood: x / Protein: x / Nitrite: x   Leuk Esterase: x / RBC: x / WBC x   Sq Epi: x / Non Sq Epi: x / Bacteria: x      CAPILLARY BLOOD GLUCOSE          RADIOLOGY & ADDITIONAL TESTS:    Imaging Personally Reviewed:  [ X] YES  [ ] NO    Consultant(s) Notes Reviewed:  [ X] YES  [ ] NO    Care Discussed with Consultants/Other Providers [X ] YES  [ ] NO
INTERVAL HPI/OVERNIGHT EVENTS:  Pt seen and examined at bedside.     Allergies/Intolerance: gabapentin (Angioedema)      MEDICATIONS  (STANDING):  atorvastatin 40 milliGRAM(s) Oral at bedtime  clopidogrel Tablet 75 milliGRAM(s) Oral daily  heparin   Injectable 5000 Unit(s) SubCutaneous every 12 hours  isosorbide   mononitrate ER Tablet (IMDUR) 30 milliGRAM(s) Oral daily  lidocaine   4% Patch 1 Patch Transdermal daily  metoprolol succinate ER 25 milliGRAM(s) Oral daily  polyethylene glycol 3350 17 Gram(s) Oral daily  QUEtiapine 25 milliGRAM(s) Oral two times a day  ranolazine 500 milliGRAM(s) Oral two times a day  senna 2 Tablet(s) Oral at bedtime    MEDICATIONS  (PRN):  acetaminophen     Tablet .. 650 milliGRAM(s) Oral every 6 hours PRN Temp greater or equal to 38C (100.4F), Mild Pain (1 - 3)  melatonin 3 milliGRAM(s) Oral at bedtime PRN Insomnia        ROS: all systems reviewed and wnl      Vital Signs Last 24 Hrs  T(C): 36.5 (01 Mar 2025 10:50), Max: 36.8 (28 Feb 2025 17:16)  T(F): 97.7 (01 Mar 2025 10:50), Max: 98.2 (28 Feb 2025 17:16)  HR: 59 (01 Mar 2025 10:50) (59 - 79)  BP: 113/72 (01 Mar 2025 10:50) (113/72 - 156/84)  BP(mean): --  RR: 18 (01 Mar 2025 10:50) (18 - 18)  SpO2: 97% (01 Mar 2025 10:50) (97% - 97%)    Parameters below as of 01 Mar 2025 10:50  Patient On (Oxygen Delivery Method): room air              GENERAL: in bed, episodes of agitation and anger outbursts.   NECK: supple, No JVD  CHEST/LUNG: clear to auscultation bilaterally; no rales, rhonchi, or wheezing b/l  HEART: normal S1, S2  ABDOMEN: BS+, soft, ND, NT   EXTREMITIES:  pulses palpable; no clubbing, cyanosis, or edema b/l LEs  Neuro- alert and confused       LABS:               Urinalysis Basic - ( 27 Feb 2025 06:07 )    Color: x / Appearance: x / SG: x / pH: x  Gluc: 88 mg/dL / Ketone: x  / Bili: x / Urobili: x   Blood: x / Protein: x / Nitrite: x   Leuk Esterase: x / RBC: x / WBC x   Sq Epi: x / Non Sq Epi: x / Bacteria: x            
INTERVAL HPI/OVERNIGHT EVENTS:  Pt seen and examined at bedside.     Allergies/Intolerance: gabapentin (Angioedema)      MEDICATIONS  (STANDING):  atorvastatin 40 milliGRAM(s) Oral at bedtime  clopidogrel Tablet 75 milliGRAM(s) Oral daily  isosorbide   mononitrate ER Tablet (IMDUR) 30 milliGRAM(s) Oral daily  lidocaine   4% Patch 1 Patch Transdermal daily  metoprolol succinate ER 25 milliGRAM(s) Oral daily  polyethylene glycol 3350 17 Gram(s) Oral daily  QUEtiapine 25 milliGRAM(s) Oral two times a day  QUEtiapine 50 milliGRAM(s) Oral at bedtime  ranolazine 500 milliGRAM(s) Oral two times a day  senna 2 Tablet(s) Oral at bedtime    MEDICATIONS  (PRN):  acetaminophen     Tablet .. 650 milliGRAM(s) Oral every 6 hours PRN Temp greater or equal to 38C (100.4F), Mild Pain (1 - 3)  melatonin 3 milliGRAM(s) Oral at bedtime PRN Insomnia        ROS: all systems reviewed and wnl      PHYSICAL EXAMINATION:  Vital Signs Last 24 Hrs  T(C): 36.9 (28 Feb 2025 12:03), Max: 36.9 (28 Feb 2025 12:03)  T(F): 98.4 (28 Feb 2025 12:03), Max: 98.4 (28 Feb 2025 12:03)  HR: 74 (28 Feb 2025 12:03) (61 - 86)  BP: 122/73 (28 Feb 2025 12:03) (122/73 - 153/86)  BP(mean): --  RR: 17 (28 Feb 2025 12:03) (17 - 18)  SpO2: 95% (28 Feb 2025 12:03) (95% - 98%)    Parameters below as of 28 Feb 2025 12:03  Patient On (Oxygen Delivery Method): room air      CAPILLARY BLOOD GLUCOSE            GENERAL: in bed, episodes of agitation and anger outbursts.   NECK: supple, No JVD  CHEST/LUNG: clear to auscultation bilaterally; no rales, rhonchi, or wheezing b/l  HEART: normal S1, S2  ABDOMEN: BS+, soft, ND, NT   EXTREMITIES:  pulses palpable; no clubbing, cyanosis, or edema b/l LEs    LABS:                        13.3   5.09  )-----------( 190      ( 27 Feb 2025 06:07 )             41.5     02-27    141  |  110[H]  |  16  ----------------------------<  88  3.9   |  26  |  1.36[H]    Ca    9.1      27 Feb 2025 06:07    TPro  6.8  /  Alb  3.3  /  TBili  0.4  /  DBili  x   /  AST  11[L]  /  ALT  14  /  AlkPhos  110  02-27      Urinalysis Basic - ( 27 Feb 2025 06:07 )    Color: x / Appearance: x / SG: x / pH: x  Gluc: 88 mg/dL / Ketone: x  / Bili: x / Urobili: x   Blood: x / Protein: x / Nitrite: x   Leuk Esterase: x / RBC: x / WBC x   Sq Epi: x / Non Sq Epi: x / Bacteria: x            
Patient is a 89y old  Male who presents with a chief complaint of Ambulatory dysfunction, acute on chronic back pain (04 Mar 2025 12:25)      INTERVAL HPI/OVERNIGHT EVENTS:  -nothing acute overnight   -started on zyprexa today   -seen and examined at bedside, daughter updated at bedside     MEDICATIONS  (STANDING):  atorvastatin 40 milliGRAM(s) Oral at bedtime  cefTRIAXone   IVPB 1000 milliGRAM(s) IV Intermittent every 24 hours  cefTRIAXone   IVPB      clopidogrel Tablet 75 milliGRAM(s) Oral daily  heparin   Injectable 5000 Unit(s) SubCutaneous every 12 hours  isosorbide   mononitrate ER Tablet (IMDUR) 30 milliGRAM(s) Oral daily  lidocaine   4% Patch 1 Patch Transdermal daily  metoprolol succinate ER 25 milliGRAM(s) Oral daily  OLANZapine 2.5 milliGRAM(s) Oral two times a day  polyethylene glycol 3350 17 Gram(s) Oral daily  ranolazine 500 milliGRAM(s) Oral two times a day  senna 2 Tablet(s) Oral at bedtime    MEDICATIONS  (PRN):  acetaminophen     Tablet .. 650 milliGRAM(s) Oral every 6 hours PRN Temp greater or equal to 38C (100.4F), Mild Pain (1 - 3)  melatonin 3 milliGRAM(s) Oral at bedtime PRN Insomnia  traMADol 25 milliGRAM(s) Oral every 6 hours PRN Severe Pain (7 - 10)      Allergies    gabapentin (Angioedema)    Intolerances        REVIEW OF SYSTEMS:  unable to assess due to mental status     Vital Signs Last 24 Hrs  T(C): 37.1 (05 Mar 2025 16:09), Max: 37.1 (05 Mar 2025 16:09)  T(F): 98.7 (05 Mar 2025 16:09), Max: 98.7 (05 Mar 2025 16:09)  HR: 63 (05 Mar 2025 16:09) (57 - 81)  BP: 114/73 (05 Mar 2025 16:09) (114/73 - 147/86)  BP(mean): --  RR: 18 (05 Mar 2025 16:09) (16 - 18)  SpO2: 93% (05 Mar 2025 16:09) (93% - 97%)    Parameters below as of 05 Mar 2025 16:09  Patient On (Oxygen Delivery Method): room air        PHYSICAL EXAM:  GENERAL: NAD, well-groomed, well-developed  HEAD:  Atraumatic, Normocephalic  EYES: EOMI, sclera non-icteric  ENMT:  Moist mucous membranes  NERVOUS SYSTEM:  Alert & Oriented to name confused re situation and location, +tangential   CHEST/LUNG: Clear to percussion bilaterally; No rales, rhonchi, wheezing, or rubs  HEART: Regular rate and rhythm; No murmurs, rubs, or gallops  ABDOMEN: Soft, Nontender, Nondistended  EXTREMITIES:  no LE edema   SKIN: dry    LABS:                        14.8   5.40  )-----------( 216      ( 05 Mar 2025 09:03 )             46.5     03-05    139  |  104  |  16  ----------------------------<  78  4.0   |  30  |  1.60[H]    Ca    9.9      05 Mar 2025 09:03  Phos  2.9     03-05  Mg     2.3     03-05        Urinalysis Basic - ( 05 Mar 2025 09:03 )    Color: x / Appearance: x / SG: x / pH: x  Gluc: 78 mg/dL / Ketone: x  / Bili: x / Urobili: x   Blood: x / Protein: x / Nitrite: x   Leuk Esterase: x / RBC: x / WBC x   Sq Epi: x / Non Sq Epi: x / Bacteria: x      CAPILLARY BLOOD GLUCOSE          RADIOLOGY & ADDITIONAL TESTS:    Imaging Personally Reviewed:  [ X] YES  [ ] NO    Consultant(s) Notes Reviewed:  [ X] YES  [ ] NO    Care Discussed with Consultants/Other Providers [X ] YES  [ ] NO

## 2025-03-07 VITALS
TEMPERATURE: 98 F | HEART RATE: 85 BPM | OXYGEN SATURATION: 96 % | RESPIRATION RATE: 18 BRPM | SYSTOLIC BLOOD PRESSURE: 154 MMHG | DIASTOLIC BLOOD PRESSURE: 78 MMHG

## 2025-03-07 PROCEDURE — 99231 SBSQ HOSP IP/OBS SF/LOW 25: CPT

## 2025-03-07 PROCEDURE — 99239 HOSP IP/OBS DSCHRG MGMT >30: CPT

## 2025-03-07 RX ORDER — CEFPODOXIME PROXETIL 200 MG/1
1 TABLET, FILM COATED ORAL
Qty: 0 | Refills: 0 | DISCHARGE
Start: 2025-03-07

## 2025-03-07 RX ORDER — LIDOCAINE HYDROCHLORIDE 20 MG/ML
0 JELLY TOPICAL
Qty: 0 | Refills: 0 | DISCHARGE
Start: 2025-03-07

## 2025-03-07 RX ORDER — MELATONIN 5 MG
1 TABLET ORAL
Qty: 0 | Refills: 0 | DISCHARGE
Start: 2025-03-07

## 2025-03-07 RX ORDER — OLANZAPINE 10 MG/1
1 TABLET ORAL
Qty: 0 | Refills: 0 | DISCHARGE
Start: 2025-03-07

## 2025-03-07 RX ORDER — SENNA 187 MG
2 TABLET ORAL
Qty: 0 | Refills: 0 | DISCHARGE
Start: 2025-03-07

## 2025-03-07 RX ORDER — POLYETHYLENE GLYCOL 3350 17 G/17G
17 POWDER, FOR SOLUTION ORAL
Qty: 0 | Refills: 0 | DISCHARGE
Start: 2025-03-07

## 2025-03-07 RX ADMIN — POLYETHYLENE GLYCOL 3350 17 GRAM(S): 17 POWDER, FOR SOLUTION ORAL at 12:40

## 2025-03-07 RX ADMIN — SODIUM CHLORIDE 1000 MILLILITER(S): 9 INJECTION, SOLUTION INTRAVENOUS at 07:53

## 2025-03-07 RX ADMIN — METOPROLOL SUCCINATE 25 MILLIGRAM(S): 50 TABLET, EXTENDED RELEASE ORAL at 06:06

## 2025-03-07 RX ADMIN — CEFPODOXIME PROXETIL 100 MILLIGRAM(S): 200 TABLET, FILM COATED ORAL at 06:15

## 2025-03-07 RX ADMIN — RANOLAZINE 500 MILLIGRAM(S): 1000 TABLET, FILM COATED, EXTENDED RELEASE ORAL at 06:05

## 2025-03-07 NOTE — BH CONSULTATION LIAISON PROGRESS NOTE - CURRENT MEDICATION
MEDICATIONS  (STANDING):  atorvastatin 40 milliGRAM(s) Oral at bedtime  cefTRIAXone   IVPB 1000 milliGRAM(s) IV Intermittent every 24 hours  cefTRIAXone   IVPB      clopidogrel Tablet 75 milliGRAM(s) Oral daily  heparin   Injectable 5000 Unit(s) SubCutaneous every 12 hours  isosorbide   mononitrate ER Tablet (IMDUR) 30 milliGRAM(s) Oral daily  lidocaine   4% Patch 1 Patch Transdermal daily  metoprolol succinate ER 25 milliGRAM(s) Oral daily  polyethylene glycol 3350 17 Gram(s) Oral daily  QUEtiapine 25 milliGRAM(s) Oral two times a day  ranolazine 500 milliGRAM(s) Oral two times a day  senna 2 Tablet(s) Oral at bedtime    MEDICATIONS  (PRN):  acetaminophen     Tablet .. 650 milliGRAM(s) Oral every 6 hours PRN Temp greater or equal to 38C (100.4F), Mild Pain (1 - 3)  melatonin 3 milliGRAM(s) Oral at bedtime PRN Insomnia  traMADol 25 milliGRAM(s) Oral every 6 hours PRN Severe Pain (7 - 10)  
MEDICATIONS  (STANDING):  atorvastatin 40 milliGRAM(s) Oral at bedtime  cefTRIAXone   IVPB 1000 milliGRAM(s) IV Intermittent every 24 hours  cefTRIAXone   IVPB      clopidogrel Tablet 75 milliGRAM(s) Oral daily  heparin   Injectable 5000 Unit(s) SubCutaneous every 12 hours  isosorbide   mononitrate ER Tablet (IMDUR) 30 milliGRAM(s) Oral daily  lidocaine   4% Patch 1 Patch Transdermal daily  metoprolol succinate ER 25 milliGRAM(s) Oral daily  OLANZapine 2.5 milliGRAM(s) Oral two times a day  polyethylene glycol 3350 17 Gram(s) Oral daily  ranolazine 500 milliGRAM(s) Oral two times a day  senna 2 Tablet(s) Oral at bedtime    MEDICATIONS  (PRN):  acetaminophen     Tablet .. 650 milliGRAM(s) Oral every 6 hours PRN Temp greater or equal to 38C (100.4F), Mild Pain (1 - 3)  melatonin 3 milliGRAM(s) Oral at bedtime PRN Insomnia  traMADol 25 milliGRAM(s) Oral every 6 hours PRN Severe Pain (7 - 10)  
MEDICATIONS  (STANDING):  atorvastatin 40 milliGRAM(s) Oral at bedtime  cefpodoxime 100 milliGRAM(s) Oral two times a day  clopidogrel Tablet 75 milliGRAM(s) Oral daily  heparin   Injectable 5000 Unit(s) SubCutaneous every 12 hours  isosorbide   mononitrate ER Tablet (IMDUR) 30 milliGRAM(s) Oral daily  lidocaine   4% Patch 1 Patch Transdermal daily  metoprolol succinate ER 25 milliGRAM(s) Oral daily  OLANZapine 2.5 milliGRAM(s) Oral daily  OLANZapine 5 milliGRAM(s) Oral at bedtime  polyethylene glycol 3350 17 Gram(s) Oral daily  ranolazine 500 milliGRAM(s) Oral two times a day  senna 2 Tablet(s) Oral at bedtime    MEDICATIONS  (PRN):  acetaminophen     Tablet .. 650 milliGRAM(s) Oral every 6 hours PRN Temp greater or equal to 38C (100.4F), Mild Pain (1 - 3)  melatonin 3 milliGRAM(s) Oral at bedtime PRN Insomnia  traMADol 25 milliGRAM(s) Oral every 6 hours PRN Severe Pain (7 - 10)  
MEDICATIONS  (STANDING):  atorvastatin 40 milliGRAM(s) Oral at bedtime  clopidogrel Tablet 75 milliGRAM(s) Oral daily  heparin   Injectable 5000 Unit(s) SubCutaneous every 12 hours  isosorbide   mononitrate ER Tablet (IMDUR) 30 milliGRAM(s) Oral daily  lactated ringers Bolus 500 milliLiter(s) IV Bolus once  lidocaine   4% Patch 1 Patch Transdermal daily  metoprolol succinate ER 25 milliGRAM(s) Oral daily  OLANZapine 2.5 milliGRAM(s) Oral daily  OLANZapine 5 milliGRAM(s) Oral at bedtime  polyethylene glycol 3350 17 Gram(s) Oral daily  ranolazine 500 milliGRAM(s) Oral two times a day  senna 2 Tablet(s) Oral at bedtime    MEDICATIONS  (PRN):  acetaminophen     Tablet .. 650 milliGRAM(s) Oral every 6 hours PRN Temp greater or equal to 38C (100.4F), Mild Pain (1 - 3)  melatonin 3 milliGRAM(s) Oral at bedtime PRN Insomnia  traMADol 25 milliGRAM(s) Oral every 6 hours PRN Severe Pain (7 - 10)

## 2025-03-07 NOTE — BH CONSULTATION LIAISON PROGRESS NOTE - NSBHCHARTREVIEWVS_PSY_A_CORE FT
Vital Signs Last 24 Hrs  T(C): 36.8 (06 Mar 2025 11:16), Max: 37.1 (05 Mar 2025 16:09)  T(F): 98.2 (06 Mar 2025 11:16), Max: 98.7 (05 Mar 2025 16:09)  HR: 66 (06 Mar 2025 11:16) (63 - 70)  BP: 123/73 (06 Mar 2025 11:16) (114/73 - 135/80)  BP(mean): --  RR: 16 (06 Mar 2025 11:16) (16 - 18)  SpO2: 97% (06 Mar 2025 11:16) (93% - 97%)    Parameters below as of 06 Mar 2025 00:34  Patient On (Oxygen Delivery Method): room air    
Vital Signs Last 24 Hrs  T(C): 37.6 (04 Mar 2025 05:02), Max: 37.6 (04 Mar 2025 05:02)  T(F): 99.7 (04 Mar 2025 05:02), Max: 99.7 (04 Mar 2025 05:02)  HR: 57 (04 Mar 2025 05:02) (57 - 57)  BP: 132/74 (04 Mar 2025 05:02) (123/78 - 132/74)  BP(mean): --  RR: 17 (04 Mar 2025 05:02) (14 - 17)  SpO2: 97% (04 Mar 2025 05:02) (96% - 97%)    
Vital Signs Last 24 Hrs  T(C): 36.9 (07 Mar 2025 10:59), Max: 36.9 (07 Mar 2025 10:59)  T(F): 98.4 (07 Mar 2025 10:59), Max: 98.4 (07 Mar 2025 10:59)  HR: 85 (07 Mar 2025 10:59) (68 - 85)  BP: 154/78 (07 Mar 2025 10:59) (151/79 - 156/89)  BP(mean): --  RR: 18 (07 Mar 2025 10:59) (18 - 18)  SpO2: 96% (07 Mar 2025 10:59) (95% - 96%)    Parameters below as of 07 Mar 2025 10:59  Patient On (Oxygen Delivery Method): room air    
Vital Signs Last 24 Hrs  T(C): 37.1 (05 Mar 2025 16:09), Max: 37.1 (05 Mar 2025 16:09)  T(F): 98.7 (05 Mar 2025 16:09), Max: 98.7 (05 Mar 2025 16:09)  HR: 63 (05 Mar 2025 16:09) (57 - 81)  BP: 114/73 (05 Mar 2025 16:09) (114/73 - 147/86)  BP(mean): --  RR: 18 (05 Mar 2025 16:09) (16 - 18)  SpO2: 93% (05 Mar 2025 16:09) (93% - 97%)    Parameters below as of 05 Mar 2025 16:09  Patient On (Oxygen Delivery Method): room air

## 2025-03-07 NOTE — DISCHARGE NOTE PROVIDER - NSDCFUADDINST_GEN_ALL_CORE_FT
Lab test review, Radiology Review, Vitals review, Consultant review and discussion, Physical examination, IDR, Assessment and plan; Plan discussion with patient and family

## 2025-03-07 NOTE — BH CONSULTATION LIAISON PROGRESS NOTE - OTHER
impaired at this time  some improvement noted ; remains suspicious  "I want to go home" concerned  low end of fair  adequate  some improvement noted  deferred

## 2025-03-07 NOTE — DISCHARGE NOTE PROVIDER - ATTENDING DISCHARGE PHYSICAL EXAMINATION:
Vital Signs Last 24 Hrs  T(C): 36.9 (07 Mar 2025 10:59), Max: 36.9 (07 Mar 2025 10:59)  T(F): 98.4 (07 Mar 2025 10:59), Max: 98.4 (07 Mar 2025 10:59)  HR: 85 (07 Mar 2025 10:59) (68 - 85)  BP: 154/78 (07 Mar 2025 10:59) (151/79 - 156/89)  BP(mean): --  RR: 18 (07 Mar 2025 10:59) (18 - 18)  SpO2: 96% (07 Mar 2025 10:59) (95% - 96%)    Parameters below as of 07 Mar 2025 10:59  Patient On (Oxygen Delivery Method): room air    GENERAL: NAD, well-groomed, well-developed  HEAD:  Atraumatic, Normocephalic  EYES: EOMI, sclera non-icteric  ENMT:  Moist mucous membranes  NERVOUS SYSTEM:  Alert & Oriented to name confused re situation and location, +tangential   CHEST/LUNG: Clear to percussion bilaterally; No rales, rhonchi, wheezing, or rubs  HEART: Regular rate and rhythm; No murmurs, rubs, or gallops  ABDOMEN: Soft, Nontender, Nondistended  EXTREMITIES:  no LE edema   SKIN: dry

## 2025-03-07 NOTE — DISCHARGE NOTE PROVIDER - HOSPITAL COURSE
HPI:  89-year-old male with past medical history of hypertension, CAD status post stents, PAD, sciatica, hyperlipidemia who presented to the ED complaining of right-sided back pain for a few weeks to the point that the patient can no longer walk or take care of ADLs. The patient lives in a house with three flights of stairs and it's impossible for him to take care of himself at this point. He denies any fevers, chills, recent trauma, falls, syncope, chest pain, dizziness, or any other acute complaints.   On presentation, vital signs were stable. Labs unremarkable. CT thoracic/lumbar spine revealed multilevel spondylosis + canal stenosis and foraminal stenosis. Patient received lidocaine, Robaxin, and tramadol in the ED. Given the patient ambulatory dysfunction and living condition, the patient is to be admitted.       #unsteady gait  CT thoracic/lumbar spine revealed multilevel spondylosis + canal stenosis and foraminal stenosis.    PT evaluated recommending [facility]-->patient discharged to [facility]  -outpatient ortho follow up     #Delirium   Patient with waxing and waning attention and agitation. Psych following  Will dc seroquel start zyprexa 3/5 per Dr Og recommendation   [ ] patient dose of zyprexa increase to 2.5    #UTI  urine cx + Aerococcus urinae  cont CTX-->will transition to Vantin EOT 3/8/25 as patient is having issues with IV    #BERTHA on CKD3a  Baseline Cr ~1.3 now 1.6, encouraged PO but will also give bolus IV fluid  Patient refused labs 3/7/25. Did receive IVF and was drinking more water when family was present.   Recommend repeat BMP 3/8/25 to ensure Cr improved    Continue home meds: Renexa 500 mg bid, Plavix 75 mg/day, Lipitor 40 mg/day, Toprol 25 mg/day.    HPI:  89-year-old male with past medical history of hypertension, CAD status post stents, PAD, sciatica, hyperlipidemia who presented to the ED complaining of right-sided back pain for a few weeks to the point that the patient can no longer walk or take care of ADLs. The patient lives in a house with three flights of stairs and it's impossible for him to take care of himself at this point. He denies any fevers, chills, recent trauma, falls, syncope, chest pain, dizziness, or any other acute complaints.   On presentation, vital signs were stable. Labs unremarkable. CT thoracic/lumbar spine revealed multilevel spondylosis + canal stenosis and foraminal stenosis. Patient received lidocaine, Robaxin, and tramadol in the ED. Given the patient ambulatory dysfunction and living condition, the patient is to be admitted.       #unsteady gait  CT thoracic/lumbar spine revealed multilevel spondylosis + canal stenosis and foraminal stenosis.    PT evaluated recommending [facility]-->patient discharged to [facility]  -outpatient ortho follow up     #Delirium   Patient with waxing and waning attention and agitation. Psych following  Will dc seroquel start zyprexa 3/5 per Dr Og recommendation   [ ] patient dose of zyprexa increase to 2.5 QAM and 5mg QHS  -discussed with family that delirium should improve when patient is home and back in usual routine. I advised that the family follows up with PCP after rehab to re-evaluate if patient still requires Zyprexa.     #UTI  urine cx + Aerococcus urinae  cont CTX-->will transition to Vantin EOT 3/8/25 as patient is having issues with IV  -discharge with Vantin EOT 3/8/25    #BERTHA on CKD3a  Baseline Cr ~1.3 now 1.6, encouraged PO but will also give bolus IV fluid  Patient refused labs 3/7/25. Did receive IVF and was drinking more water when family was present.   Recommend repeat BMP 3/8/25 to ensure Cr improved    Continue home meds: Renexa 500 mg bid, Plavix 75 mg/day, Lipitor 40 mg/day, Toprol 25 mg/day.

## 2025-03-07 NOTE — BH CONSULTATION LIAISON PROGRESS NOTE - NSBHCONSULTRECOMMENDOTHER_PSY_A_CORE FT
3/3/25: continue Seroquel 25mg PO bid, Zyprexa 5mg IM q6hrs PRN for severe agitation   - pain is a big factor in restlessness, agitation. Try standing tramadol at 50mg PO bid with goal to control pain so that Patient is comfortable and doesnot need any PRNs   - consider Cardiology consult   3/4/25: discontinue Seroquel and start Zyprexa 2.5mg PO bid for paranoia   3/5/25: continue Zyprexa 2.5mg PO bid for paranoia  3/6/25: increase Zyprexa to 2.5mg PO in am and 5mg PO qhs for paranoia  3/7/25: DC to [facility] today   - can continue Zyprexa 5mg PO qhs at [facility] until paranoia/irritability remits and Pt returns to baseline mental status; then stop taking it    - [facility] Psychiatry signing off  3/3/25: continue Seroquel 25mg PO bid, Zyprexa 5mg IM q6hrs PRN for severe agitation   - pain is a big factor in restlessness, agitation. Try standing tramadol at 50mg PO bid with goal to control pain so that Patient is comfortable and doesnot need any PRNs   - consider Cardiology consult   3/4/25: discontinue Seroquel and start Zyprexa 2.5mg PO bid for paranoia   3/5/25: continue Zyprexa 2.5mg PO bid for paranoia  3/6/25: increase Zyprexa to 2.5mg PO in am and 5mg PO qhs for paranoia  3/7/25: DC to [facility] today   - can continue Zyprexa 2.5mg qam and 5mg PO qhs at [facility] until paranoia/irritability remits and Pt returns to baseline mental status; then stop taking it    - [facility] Psychiatry signing off

## 2025-03-07 NOTE — BH CONSULTATION LIAISON PROGRESS NOTE - NSBHCONSULTFOLLOWAFTERCARE_PSY_A_CORE FT
continue Zyprexa 5mg PO qhs at [facility] until paranoia/irritability remits and Pt returns to baseline mental status; then stop taking it   continue Zyprexa 2.5mg qam and 5mg PO qhs at [facility] until paranoia/irritability remits and Pt returns to baseline mental status; then stop taking it

## 2025-03-07 NOTE — DISCHARGE NOTE NURSING/CASE MANAGEMENT/SOCIAL WORK - FINANCIAL ASSISTANCE
[facility] provides services at a reduced cost to those who are determined to be eligible through [facility]’s financial assistance program. Information regarding [facility]’s financial assistance program can be found by going to [email or web address] or by calling [phone].

## 2025-03-07 NOTE — DISCHARGE NOTE NURSING/CASE MANAGEMENT/SOCIAL WORK - NSDCPEFALRISK_GEN_ALL_CORE
For information on Fall & Injury Prevention, visit: [email or web address] OR  [email or web address] OR  https://www.cdc.gov/steadi/patient.html

## 2025-03-07 NOTE — DISCHARGE NOTE NURSING/CASE MANAGEMENT/SOCIAL WORK - PATIENT PORTAL LINK FT
You can access the FollowMyHealth Patient Portal offered by [facility] by registering at the following website: [email or web address]. By joining [facility]’s FollowMyHealth portal, you will also be able to view your health information using other applications (apps) compatible with our system.

## 2025-03-07 NOTE — BH CONSULTATION LIAISON PROGRESS NOTE - NSBHCHARTREVIEWLAB_PSY_A_CORE FT
03-05    139  |  104  |  16  ----------------------------<  78  4.0   |  30  |  1.60[H]    Ca    9.9      05 Mar 2025 09:03  Phos  2.9     03-05  Mg     2.3     03-05    
03-06    140  |  107  |  17  ----------------------------<  100[H]  4.0   |  28  |  1.60[H]    Ca    9.6      06 Mar 2025 10:00  Phos  3.0     03-06  Mg     2.3     03-06    
03-05    139  |  104  |  16  ----------------------------<  78  4.0   |  30  |  1.60[H]    Ca    9.9      05 Mar 2025 09:03  Phos  2.9     03-05  Mg     2.3     03-05

## 2025-03-07 NOTE — BH CONSULTATION LIAISON PROGRESS NOTE - NSBHMSETHTPROC_PSY_A_CORE
Linear/Impaired reasoning

## 2025-03-07 NOTE — BH CONSULTATION LIAISON PROGRESS NOTE - NSBHATTESTBILLING_PSY_A_CORE
44006-Qzdezqmuyp OBS or IP - low complexity OR 25-34 mins
93137-Ewzjwwjvyp OBS or IP - low complexity OR 25-34 mins
90737-Jtpdcrpilf OBS or IP - low complexity OR 25-34 mins
56196-Fbiylurujv OBS or IP - low complexity OR 25-34 mins

## 2025-03-07 NOTE — DISCHARGE NOTE PROVIDER - NSDCCPCAREPLAN_GEN_ALL_CORE_FT
PRINCIPAL DISCHARGE DIAGNOSIS  Diagnosis: Low back pain  Assessment and Plan of Treatment: You came to the hospital due to an unsteady gait and back pain. You had a CT scan that showed Moderately advanced degenerative endplate sclerosis, irregularity   and disc space narrowing in the upper and midthoracic levels, progressed from MRI   dated 11/29/2021. No evidence of high-grade canal or foraminal stenosis on CT. Advanced multilevel lumbar spondylosis with associated   with mild canal stenosis and moderate foraminal stenosis at L1-L2 and L2-L3. Mild canal and foraminal stenosis at the other lumbar levels.  You were discharged to rehab to help with you unsteady gait. Please follow up with an orthopedic surgeon in clinic to address the spinal stenosis.      SECONDARY DISCHARGE DIAGNOSES  Diagnosis: Delirium  Assessment and Plan of Treatment: You were agitated due to delirium while in  the hospital. You were started on zyprexa with improvement in your symptoms. Your delirium should improve when you get home and resume your usual routine. Please follow up with your PCP and see if you need to continue zyprexa after rehab.    Diagnosis: Acute UTI  Assessment and Plan of Treatment: You received IV antibotics for a UTI. You were discharged with oral antibiotics to complete your antibiotic course. Your last day of antibiotics will be 3/8/25.

## 2025-03-07 NOTE — DISCHARGE NOTE PROVIDER - NSDCMRMEDTOKEN_GEN_ALL_CORE_FT
atorvastatin 40 mg oral tablet: 1 tab(s) orally once a day  clopidogrel 75 mg oral tablet: 1 tab(s) orally once a day  isosorbide mononitrate 30 mg oral tablet, extended release: 1 tab(s) orally once a day (in the morning)  melatonin 3 mg oral tablet: 1 tab(s) orally once a day (at bedtime) As needed Insomnia  metoprolol succinate 25 mg oral tablet, extended release: 0.5 tab(s) orally once a day  OLANZapine 2.5 mg oral tablet: 1 tab(s) orally once a day  OLANZapine 5 mg oral tablet: 1 tab(s) orally once a day (at bedtime)  ranolazine 500 mg oral tablet, extended release: 1 tab(s) orally 2 times a day   atorvastatin 40 mg oral tablet: 1 tab(s) orally once a day  cefpodoxime 100 mg oral tablet: 1 tab(s) orally 2 times a day last day of treatment 3/8/35  clopidogrel 75 mg oral tablet: 1 tab(s) orally once a day  isosorbide mononitrate 30 mg oral tablet, extended release: 1 tab(s) orally once a day (in the morning)  lidocaine 4% topical film: Apply topically to affected area every 24 hours  melatonin 3 mg oral tablet: 1 tab(s) orally once a day (at bedtime) As needed Insomnia  metoprolol succinate 25 mg oral tablet, extended release: 0.5 tab(s) orally once a day  OLANZapine 2.5 mg oral tablet: 1 tab(s) orally once a day  OLANZapine 5 mg oral tablet: 1 tab(s) orally once a day (at bedtime)  polyethylene glycol 3350 oral powder for reconstitution: 17 gram(s) orally once a day  ranolazine 500 mg oral tablet, extended release: 1 tab(s) orally 2 times a day  senna leaf extract oral tablet: 2 tab(s) orally once a day (at bedtime)

## 2025-03-07 NOTE — BH CONSULTATION LIAISON PROGRESS NOTE - NSICDXBHPRIMARYDX_PSY_ALL_CORE
Delirium superimposed on dementia   F05  

## 2025-03-07 NOTE — BH CONSULTATION LIAISON PROGRESS NOTE - NSBHFUPINTERVALHXFT_PSY_A_CORE
Pt accepted for [facility] at [facility], leaving today. No subsequent agitation since last seen. Medication compliant; continues on the Zyprexa, tolerating it without any adverse medication side effects reported or manifested with less irritability, paranoia indicating clinical response. EXAM: same clinical presentation as yesterday - awake, alert, sitting in chair next to bed, more calm, cooperative today and less preoccupied / less paranoid but remains skeptical, unhappy at being here and distrust of staff and doctors' intention.

## 2025-03-12 DIAGNOSIS — E78.5 HYPERLIPIDEMIA, UNSPECIFIED: ICD-10-CM

## 2025-03-12 DIAGNOSIS — B96.89 OTHER SPECIFIED BACTERIAL AGENTS AS THE CAUSE OF DISEASES CLASSIFIED ELSEWHERE: ICD-10-CM

## 2025-03-12 DIAGNOSIS — I25.10 ATHEROSCLEROTIC HEART DISEASE OF NATIVE CORONARY ARTERY WITHOUT ANGINA PECTORIS: ICD-10-CM

## 2025-03-12 DIAGNOSIS — I12.9 HYPERTENSIVE CHRONIC KIDNEY DISEASE WITH STAGE 1 THROUGH STAGE 4 CHRONIC KIDNEY DISEASE, OR UNSPECIFIED CHRONIC KIDNEY DISEASE: ICD-10-CM

## 2025-03-12 DIAGNOSIS — M48.061 SPINAL STENOSIS, LUMBAR REGION WITHOUT NEUROGENIC CLAUDICATION: ICD-10-CM

## 2025-03-12 DIAGNOSIS — Z88.8 ALLERGY STATUS TO OTHER DRUGS, MEDICAMENTS AND BIOLOGICAL SUBSTANCES: ICD-10-CM

## 2025-03-12 DIAGNOSIS — R41.0 DISORIENTATION, UNSPECIFIED: ICD-10-CM

## 2025-03-12 DIAGNOSIS — R26.81 UNSTEADINESS ON FEET: ICD-10-CM

## 2025-03-12 DIAGNOSIS — Z95.5 PRESENCE OF CORONARY ANGIOPLASTY IMPLANT AND GRAFT: ICD-10-CM

## 2025-03-12 DIAGNOSIS — N39.0 URINARY TRACT INFECTION, SITE NOT SPECIFIED: ICD-10-CM

## 2025-03-12 DIAGNOSIS — M54.30 SCIATICA, UNSPECIFIED SIDE: ICD-10-CM

## 2025-03-12 DIAGNOSIS — Z79.02 LONG TERM (CURRENT) USE OF ANTITHROMBOTICS/ANTIPLATELETS: ICD-10-CM

## 2025-03-12 DIAGNOSIS — N17.9 ACUTE KIDNEY FAILURE, UNSPECIFIED: ICD-10-CM
